# Patient Record
Sex: FEMALE | Race: WHITE | Employment: FULL TIME | URBAN - METROPOLITAN AREA
[De-identification: names, ages, dates, MRNs, and addresses within clinical notes are randomized per-mention and may not be internally consistent; named-entity substitution may affect disease eponyms.]

---

## 2017-03-29 ENCOUNTER — INITIAL CONSULT (OUTPATIENT)
Dept: PULMONOLOGY | Age: 52
End: 2017-03-29

## 2017-03-29 VITALS
DIASTOLIC BLOOD PRESSURE: 80 MMHG | WEIGHT: 170 LBS | OXYGEN SATURATION: 98 % | HEIGHT: 67 IN | HEART RATE: 106 BPM | SYSTOLIC BLOOD PRESSURE: 110 MMHG | BODY MASS INDEX: 26.68 KG/M2

## 2017-03-29 DIAGNOSIS — G25.81 RLS (RESTLESS LEGS SYNDROME): ICD-10-CM

## 2017-03-29 DIAGNOSIS — R06.83 SNORING: ICD-10-CM

## 2017-03-29 DIAGNOSIS — G47.10 HYPERSOMNIA: Primary | ICD-10-CM

## 2017-03-29 DIAGNOSIS — F33.41 RECURRENT MAJOR DEPRESSIVE DISORDER, IN PARTIAL REMISSION (HCC): Chronic | ICD-10-CM

## 2017-03-29 LAB
FERRITIN: 101.7 NG/ML (ref 15–150)
IRON SATURATION: 37 % (ref 15–50)
IRON: 104 UG/DL (ref 37–145)
TOTAL IRON BINDING CAPACITY: 281 UG/DL (ref 260–445)

## 2017-03-29 PROCEDURE — 99204 OFFICE O/P NEW MOD 45 MIN: CPT | Performed by: INTERNAL MEDICINE

## 2017-03-29 ASSESSMENT — ENCOUNTER SYMPTOMS
BACK PAIN: 1
APNEA: 1
PHOTOPHOBIA: 0
EYE PAIN: 0
ALLERGIC/IMMUNOLOGIC NEGATIVE: 1
NAUSEA: 0
CHOKING: 0
SHORTNESS OF BREATH: 0
RHINORRHEA: 0
CHEST TIGHTNESS: 0
VOMITING: 0
ABDOMINAL PAIN: 0
ABDOMINAL DISTENTION: 0

## 2017-03-29 ASSESSMENT — SLEEP AND FATIGUE QUESTIONNAIRES
HOW LIKELY ARE YOU TO NOD OFF OR FALL ASLEEP WHILE WATCHING TV: 2
ESS TOTAL SCORE: 7
HOW LIKELY ARE YOU TO NOD OFF OR FALL ASLEEP WHILE SITTING AND TALKING TO SOMEONE: 0
HOW LIKELY ARE YOU TO NOD OFF OR FALL ASLEEP WHILE SITTING QUIETLY AFTER LUNCH WITHOUT ALCOHOL: 0
HOW LIKELY ARE YOU TO NOD OFF OR FALL ASLEEP WHILE SITTING INACTIVE IN A PUBLIC PLACE: 0
HOW LIKELY ARE YOU TO NOD OFF OR FALL ASLEEP WHILE LYING DOWN TO REST IN THE AFTERNOON WHEN CIRCUMSTANCES PERMIT: 2
HOW LIKELY ARE YOU TO NOD OFF OR FALL ASLEEP WHEN YOU ARE A PASSENGER IN A CAR FOR AN HOUR WITHOUT A BREAK: 2
NECK CIRCUMFERENCE (INCHES): 17
HOW LIKELY ARE YOU TO NOD OFF OR FALL ASLEEP IN A CAR, WHILE STOPPED FOR A FEW MINUTES IN TRAFFIC: 0
HOW LIKELY ARE YOU TO NOD OFF OR FALL ASLEEP WHILE SITTING AND READING: 1

## 2017-04-03 RX ORDER — VENLAFAXINE HYDROCHLORIDE 75 MG/1
CAPSULE, EXTENDED RELEASE ORAL
Qty: 90 CAPSULE | Refills: 3 | Status: SHIPPED | OUTPATIENT
Start: 2017-04-03 | End: 2018-04-05 | Stop reason: SDUPTHER

## 2017-04-06 ENCOUNTER — HOSPITAL ENCOUNTER (OUTPATIENT)
Dept: SLEEP MEDICINE | Age: 52
Discharge: HOME | End: 2017-04-08
Attending: INTERNAL MEDICINE | Admitting: INTERNAL MEDICINE

## 2017-04-06 DIAGNOSIS — R06.83 SNORING: ICD-10-CM

## 2017-04-06 DIAGNOSIS — G47.10 HYPERSOMNIA: ICD-10-CM

## 2017-04-06 PROCEDURE — 95810 POLYSOM 6/> YRS 4/> PARAM: CPT | Performed by: INTERNAL MEDICINE

## 2017-04-11 ENCOUNTER — TELEPHONE (OUTPATIENT)
Dept: SLEEP MEDICINE | Age: 52
End: 2017-04-11

## 2017-04-13 ENCOUNTER — HOSPITAL ENCOUNTER (OUTPATIENT)
Dept: SLEEP MEDICINE | Age: 52
Discharge: HOME | End: 2017-04-15
Attending: INTERNAL MEDICINE | Admitting: INTERNAL MEDICINE

## 2017-04-13 DIAGNOSIS — R06.83 SNORING: ICD-10-CM

## 2017-04-13 DIAGNOSIS — G47.10 HYPERSOMNIA: ICD-10-CM

## 2017-04-13 PROCEDURE — 95811 POLYSOM 6/>YRS CPAP 4/> PARM: CPT | Performed by: INTERNAL MEDICINE

## 2017-05-17 ENCOUNTER — EMPLOYEE WELLNESS (OUTPATIENT)
Dept: OTHER | Age: 52
End: 2017-05-17

## 2017-05-17 LAB
CHOLESTEROL, TOTAL: 192 MG/DL (ref 0–199)
GLUCOSE BLD-MCNC: 77 MG/DL (ref 70–99)
HDLC SERPL-MCNC: 50 MG/DL (ref 40–60)
LDL CHOLESTEROL CALCULATED: 116 MG/DL
TRIGL SERPL-MCNC: 132 MG/DL (ref 0–150)

## 2017-06-06 ENCOUNTER — TELEPHONE (OUTPATIENT)
Dept: PULMONOLOGY | Age: 52
End: 2017-06-06

## 2017-06-07 ENCOUNTER — OFFICE VISIT (OUTPATIENT)
Dept: PULMONOLOGY | Age: 52
End: 2017-06-07

## 2017-06-07 VITALS
BODY MASS INDEX: 26.78 KG/M2 | WEIGHT: 170.6 LBS | HEART RATE: 72 BPM | DIASTOLIC BLOOD PRESSURE: 78 MMHG | HEIGHT: 67 IN | SYSTOLIC BLOOD PRESSURE: 118 MMHG | RESPIRATION RATE: 12 BRPM

## 2017-06-07 DIAGNOSIS — G47.33 OBSTRUCTIVE SLEEP APNEA SYNDROME: Primary | ICD-10-CM

## 2017-06-07 DIAGNOSIS — F33.41 RECURRENT MAJOR DEPRESSIVE DISORDER, IN PARTIAL REMISSION (HCC): Chronic | ICD-10-CM

## 2017-06-07 PROCEDURE — 99213 OFFICE O/P EST LOW 20 MIN: CPT | Performed by: NURSE PRACTITIONER

## 2017-06-07 ASSESSMENT — ENCOUNTER SYMPTOMS
ABDOMINAL DISTENTION: 0
COUGH: 0
SHORTNESS OF BREATH: 0
RHINORRHEA: 0
ABDOMINAL PAIN: 0
APNEA: 0
SINUS PRESSURE: 0

## 2017-06-07 ASSESSMENT — SLEEP AND FATIGUE QUESTIONNAIRES
HOW LIKELY ARE YOU TO NOD OFF OR FALL ASLEEP IN A CAR, WHILE STOPPED FOR A FEW MINUTES IN TRAFFIC: 0
ESS TOTAL SCORE: 3
HOW LIKELY ARE YOU TO NOD OFF OR FALL ASLEEP WHEN YOU ARE A PASSENGER IN A CAR FOR AN HOUR WITHOUT A BREAK: 1
HOW LIKELY ARE YOU TO NOD OFF OR FALL ASLEEP WHILE LYING DOWN TO REST IN THE AFTERNOON WHEN CIRCUMSTANCES PERMIT: 1
HOW LIKELY ARE YOU TO NOD OFF OR FALL ASLEEP WHILE WATCHING TV: 1
HOW LIKELY ARE YOU TO NOD OFF OR FALL ASLEEP WHILE SITTING QUIETLY AFTER LUNCH WITHOUT ALCOHOL: 0
HOW LIKELY ARE YOU TO NOD OFF OR FALL ASLEEP WHILE SITTING AND TALKING TO SOMEONE: 0
HOW LIKELY ARE YOU TO NOD OFF OR FALL ASLEEP WHILE SITTING AND READING: 0
HOW LIKELY ARE YOU TO NOD OFF OR FALL ASLEEP WHILE SITTING INACTIVE IN A PUBLIC PLACE: 0

## 2017-09-10 DIAGNOSIS — E78.00 HYPERCHOLESTEREMIA: ICD-10-CM

## 2017-09-11 RX ORDER — SIMVASTATIN 40 MG
TABLET ORAL
Qty: 90 TABLET | Refills: 3 | Status: SHIPPED | OUTPATIENT
Start: 2017-09-11 | End: 2018-09-16 | Stop reason: SDUPTHER

## 2017-12-06 ENCOUNTER — OFFICE VISIT (OUTPATIENT)
Dept: PULMONOLOGY | Age: 52
End: 2017-12-06

## 2017-12-06 VITALS
SYSTOLIC BLOOD PRESSURE: 120 MMHG | BODY MASS INDEX: 27.41 KG/M2 | HEART RATE: 76 BPM | DIASTOLIC BLOOD PRESSURE: 70 MMHG | WEIGHT: 175 LBS

## 2017-12-06 DIAGNOSIS — F33.41 RECURRENT MAJOR DEPRESSIVE DISORDER, IN PARTIAL REMISSION (HCC): Chronic | ICD-10-CM

## 2017-12-06 DIAGNOSIS — G47.33 OBSTRUCTIVE SLEEP APNEA SYNDROME: Primary | Chronic | ICD-10-CM

## 2017-12-06 PROCEDURE — 99212 OFFICE O/P EST SF 10 MIN: CPT | Performed by: NURSE PRACTITIONER

## 2017-12-06 ASSESSMENT — ENCOUNTER SYMPTOMS
APNEA: 0
ABDOMINAL DISTENTION: 0
COUGH: 0
RHINORRHEA: 0
SHORTNESS OF BREATH: 0
SINUS PRESSURE: 0
ABDOMINAL PAIN: 0

## 2017-12-06 ASSESSMENT — SLEEP AND FATIGUE QUESTIONNAIRES
HOW LIKELY ARE YOU TO NOD OFF OR FALL ASLEEP WHEN YOU ARE A PASSENGER IN A CAR FOR AN HOUR WITHOUT A BREAK: 2
HOW LIKELY ARE YOU TO NOD OFF OR FALL ASLEEP WHILE SITTING QUIETLY AFTER LUNCH WITHOUT ALCOHOL: 1
HOW LIKELY ARE YOU TO NOD OFF OR FALL ASLEEP WHILE SITTING AND TALKING TO SOMEONE: 0
HOW LIKELY ARE YOU TO NOD OFF OR FALL ASLEEP WHILE SITTING INACTIVE IN A PUBLIC PLACE: 0
HOW LIKELY ARE YOU TO NOD OFF OR FALL ASLEEP WHILE SITTING AND READING: 0
ESS TOTAL SCORE: 5
HOW LIKELY ARE YOU TO NOD OFF OR FALL ASLEEP WHILE LYING DOWN TO REST IN THE AFTERNOON WHEN CIRCUMSTANCES PERMIT: 1
HOW LIKELY ARE YOU TO NOD OFF OR FALL ASLEEP IN A CAR, WHILE STOPPED FOR A FEW MINUTES IN TRAFFIC: 0
HOW LIKELY ARE YOU TO NOD OFF OR FALL ASLEEP WHILE WATCHING TV: 1

## 2017-12-06 NOTE — PROGRESS NOTES
ACHILLES TENDON SURGERY Right     CHOLECYSTECTOMY      HYSTERECTOMY, VAGINAL      DUB:  Dr. Soliz See       Family History   Problem Relation Age of Onset    Other Mother     Heart Attack Father 46      at 66    Hypertension Father     Other Brother      bipolar       Vitals:  Weight BMI   Wt Readings from Last 3 Encounters:   17 175 lb (79.4 kg)   17 170 lb 9.6 oz (77.4 kg)   17 170 lb (77.1 kg)    Body mass index is 27.41 kg/m². BP HR SaO2   BP Readings from Last 3 Encounters:   17 120/70   17 118/78   17 110/80    Pulse Readings from Last 3 Encounters:   17 76   17 72   17 106    SpO2 Readings from Last 3 Encounters:   17 98%        Assessment:     1. Obstructive sleep apnea syndrome Stable    2. Recurrent major depressive disorder, in partial remission (HCC) Stable        The chronic medical conditions listed are directly related to the primary diagnosis listed above. The management of the primary diagnosis affects the secondary diagnosis and vice versa. Plan:   - Educated patient and reviewed compliance download with pt.    -Supplies and parts as needed for her machine, these are medically necessary.    - Patient using Other Patient Aides for supplies  -Continue medications per her PCP and other physicians.   -Limit caffeine use after 3pm.    -Encouraged her to work on weight loss through diet and exercise. -F/U: 12 month. No orders of the defined types were placed in this encounter. No orders of the defined types were placed in this encounter. No orders of the defined types were placed in this encounter.       Vanessa Pruett, MSN, RN, CNP

## 2017-12-06 NOTE — LETTER
[facility] Sleep Medicine  [address] [facility]  [address] [place] [id number]  Phone: [phone]  Fax: [phone]    December 6, 2017       Patient: Alessia Verma   MR Number: [id number]   YOB: 1965   Date of Visit: 12/6/2017       Alessia Verma was seen for a follow up visit today. Here is my assessment and plan as well as an attached copy of her visit today:      ASSESSMENT:  Wanda Jade was seen today for follow-up. Diagnoses and all orders for this visit:    Obstructive sleep apnea syndrome    Recurrent major depressive disorder, in partial remission ([facility]        Plan:       If you have questions or concerns, please do not hesitate to call me. I look forward to following Wanda Jade along with you.     Sincerely,      Maru Jeffries CNP    CC providers:  Michael Jones MD  [address] [place] [id number]  [facility]

## 2017-12-18 ENCOUNTER — HOSPITAL ENCOUNTER (OUTPATIENT)
Dept: MAMMOGRAPHY | Age: 52
Discharge: HOME | End: 2017-12-18
Attending: FAMILY MEDICINE | Admitting: FAMILY MEDICINE

## 2017-12-18 DIAGNOSIS — Z12.31 VISIT FOR SCREENING MAMMOGRAM: ICD-10-CM

## 2017-12-21 ENCOUNTER — HOSPITAL ENCOUNTER (OUTPATIENT)
Dept: MAMMOGRAPHY | Age: 52
Discharge: HOME | End: 2017-12-21
Attending: FAMILY MEDICINE | Admitting: FAMILY MEDICINE

## 2017-12-21 DIAGNOSIS — R92.8 ABNORMAL MAMMOGRAM: ICD-10-CM

## 2017-12-29 ENCOUNTER — TELEPHONE (OUTPATIENT)
Dept: FAMILY MEDICINE CLINIC | Age: 52
End: 2017-12-29

## 2017-12-29 NOTE — TELEPHONE ENCOUNTER
Please call:  She needs to make an appointment for a breast exam - she will likely need a diagnostic mammogram of her R breast.  If she has a gynecologist she can set it up with them.   We just want to make sure we follow up on the incomplete mammogram.    Thanks,  Mary Espinosa MD

## 2018-03-19 ENCOUNTER — TELEPHONE (OUTPATIENT)
Dept: GYNECOLOGY | Age: 53
End: 2018-03-19

## 2018-03-20 VITALS — BODY MASS INDEX: 26.31 KG/M2 | WEIGHT: 168 LBS

## 2018-03-27 ENCOUNTER — TELEPHONE (OUTPATIENT)
Dept: FAMILY MEDICINE CLINIC | Age: 53
End: 2018-03-27

## 2018-04-02 RX ORDER — VENLAFAXINE HYDROCHLORIDE 75 MG/1
CAPSULE, EXTENDED RELEASE ORAL
Qty: 90 CAPSULE | Refills: 3 | OUTPATIENT
Start: 2018-04-02

## 2018-04-05 ENCOUNTER — TELEPHONE (OUTPATIENT)
Dept: FAMILY MEDICINE CLINIC | Age: 53
End: 2018-04-05

## 2018-04-05 RX ORDER — VENLAFAXINE HYDROCHLORIDE 75 MG/1
75 CAPSULE, EXTENDED RELEASE ORAL DAILY
Qty: 90 CAPSULE | Refills: 0 | Status: SHIPPED | OUTPATIENT
Start: 2018-04-05 | End: 2018-07-01 | Stop reason: SDUPTHER

## 2018-04-09 ENCOUNTER — OFFICE VISIT (OUTPATIENT)
Dept: FAMILY MEDICINE CLINIC | Age: 53
End: 2018-04-09

## 2018-04-09 VITALS
DIASTOLIC BLOOD PRESSURE: 72 MMHG | BODY MASS INDEX: 25.9 KG/M2 | WEIGHT: 165 LBS | HEART RATE: 101 BPM | SYSTOLIC BLOOD PRESSURE: 103 MMHG | HEIGHT: 67 IN

## 2018-04-09 DIAGNOSIS — E78.00 HYPERCHOLESTEREMIA: Primary | Chronic | ICD-10-CM

## 2018-04-09 DIAGNOSIS — Z23 NEED FOR PROPHYLACTIC VACCINATION AGAINST STREPTOCOCCUS PNEUMONIAE (PNEUMOCOCCUS): ICD-10-CM

## 2018-04-09 DIAGNOSIS — F33.41 RECURRENT MAJOR DEPRESSIVE DISORDER, IN PARTIAL REMISSION (HCC): Chronic | ICD-10-CM

## 2018-04-09 DIAGNOSIS — Z72.0 TOBACCO ABUSE: ICD-10-CM

## 2018-04-09 DIAGNOSIS — G47.33 OBSTRUCTIVE SLEEP APNEA: ICD-10-CM

## 2018-04-09 LAB
A/G RATIO: 1.9 (ref 1.1–2.2)
ALBUMIN SERPL-MCNC: 4.4 G/DL (ref 3.4–5)
ALP BLD-CCNC: 64 U/L (ref 40–129)
ALT SERPL-CCNC: 14 U/L (ref 10–40)
ANION GAP SERPL CALCULATED.3IONS-SCNC: 16 MMOL/L (ref 3–16)
AST SERPL-CCNC: 25 U/L (ref 15–37)
BILIRUB SERPL-MCNC: <0.2 MG/DL (ref 0–1)
BUN BLDV-MCNC: 14 MG/DL (ref 7–20)
CALCIUM SERPL-MCNC: 9.3 MG/DL (ref 8.3–10.6)
CHLORIDE BLD-SCNC: 101 MMOL/L (ref 99–110)
CHOLESTEROL, TOTAL: 217 MG/DL (ref 0–199)
CO2: 22 MMOL/L (ref 21–32)
CREAT SERPL-MCNC: 1 MG/DL (ref 0.6–1.1)
GFR AFRICAN AMERICAN: >60
GFR NON-AFRICAN AMERICAN: 58
GLOBULIN: 2.3 G/DL
GLUCOSE BLD-MCNC: 91 MG/DL (ref 70–99)
HDLC SERPL-MCNC: 47 MG/DL (ref 40–60)
LDL CHOLESTEROL CALCULATED: 130 MG/DL
POTASSIUM SERPL-SCNC: 5.5 MMOL/L (ref 3.5–5.1)
SODIUM BLD-SCNC: 139 MMOL/L (ref 136–145)
TOTAL PROTEIN: 6.7 G/DL (ref 6.4–8.2)
TRIGL SERPL-MCNC: 202 MG/DL (ref 0–150)
VLDLC SERPL CALC-MCNC: 40 MG/DL

## 2018-04-09 PROCEDURE — 99214 OFFICE O/P EST MOD 30 MIN: CPT | Performed by: FAMILY MEDICINE

## 2018-04-09 PROCEDURE — 36415 COLL VENOUS BLD VENIPUNCTURE: CPT | Performed by: FAMILY MEDICINE

## 2018-04-09 PROCEDURE — 90732 PPSV23 VACC 2 YRS+ SUBQ/IM: CPT | Performed by: FAMILY MEDICINE

## 2018-04-09 PROCEDURE — 90471 IMMUNIZATION ADMIN: CPT | Performed by: FAMILY MEDICINE

## 2018-04-09 RX ORDER — VARENICLINE TARTRATE 25 MG
KIT ORAL
Qty: 1 EACH | Refills: 0 | Status: SHIPPED | OUTPATIENT
Start: 2018-04-09 | End: 2018-12-06

## 2018-04-09 RX ORDER — CETIRIZINE HYDROCHLORIDE 10 MG/1
10 TABLET ORAL DAILY
COMMUNITY

## 2018-04-09 RX ORDER — VARENICLINE TARTRATE 1 MG/1
1 TABLET, FILM COATED ORAL 2 TIMES DAILY
Qty: 60 TABLET | Refills: 3 | Status: SHIPPED | OUTPATIENT
Start: 2018-04-09 | End: 2018-12-06

## 2018-04-09 ASSESSMENT — ENCOUNTER SYMPTOMS
NAUSEA: 0
CONSTIPATION: 0
VOMITING: 0
COUGH: 0
SHORTNESS OF BREATH: 0
DIARRHEA: 0
ABDOMINAL PAIN: 1

## 2018-04-09 ASSESSMENT — PATIENT HEALTH QUESTIONNAIRE - PHQ9
1. LITTLE INTEREST OR PLEASURE IN DOING THINGS: 0
SUM OF ALL RESPONSES TO PHQ QUESTIONS 1-9: 0
SUM OF ALL RESPONSES TO PHQ9 QUESTIONS 1 & 2: 0
2. FEELING DOWN, DEPRESSED OR HOPELESS: 0

## 2018-05-14 ENCOUNTER — EMPLOYEE WELLNESS (OUTPATIENT)
Dept: OTHER | Age: 53
End: 2018-05-14

## 2018-05-15 ENCOUNTER — OFFICE VISIT (OUTPATIENT)
Dept: GYNECOLOGY | Age: 53
End: 2018-05-15

## 2018-05-15 VITALS
WEIGHT: 168.6 LBS | RESPIRATION RATE: 17 BRPM | SYSTOLIC BLOOD PRESSURE: 105 MMHG | BODY MASS INDEX: 26.46 KG/M2 | HEIGHT: 67 IN | HEART RATE: 87 BPM | DIASTOLIC BLOOD PRESSURE: 72 MMHG | TEMPERATURE: 97.3 F

## 2018-05-15 DIAGNOSIS — Z01.419 WELL WOMAN EXAM WITH ROUTINE GYNECOLOGICAL EXAM: Primary | ICD-10-CM

## 2018-05-15 DIAGNOSIS — Z78.0 MENOPAUSE: ICD-10-CM

## 2018-05-15 PROCEDURE — 99386 PREV VISIT NEW AGE 40-64: CPT | Performed by: OBSTETRICS & GYNECOLOGY

## 2018-05-17 LAB
HPV COMMENT: NORMAL
HPV TYPE 16: NOT DETECTED
HPV TYPE 18: NOT DETECTED
HPVOH (OTHER TYPES): NOT DETECTED

## 2018-05-17 ASSESSMENT — ENCOUNTER SYMPTOMS
EYES NEGATIVE: 1
RESPIRATORY NEGATIVE: 1
GASTROINTESTINAL NEGATIVE: 1

## 2018-05-21 VITALS — BODY MASS INDEX: 26 KG/M2 | WEIGHT: 166 LBS

## 2018-07-02 RX ORDER — VENLAFAXINE HYDROCHLORIDE 75 MG/1
CAPSULE, EXTENDED RELEASE ORAL
Qty: 90 CAPSULE | Refills: 3 | Status: SHIPPED | OUTPATIENT
Start: 2018-07-02 | End: 2019-07-01 | Stop reason: SDUPTHER

## 2018-09-16 DIAGNOSIS — E78.00 HYPERCHOLESTEREMIA: ICD-10-CM

## 2018-09-17 RX ORDER — SIMVASTATIN 40 MG
TABLET ORAL
Qty: 90 TABLET | Refills: 3 | Status: SHIPPED | OUTPATIENT
Start: 2018-09-17 | End: 2019-09-11 | Stop reason: SDUPTHER

## 2018-12-06 ENCOUNTER — OFFICE VISIT (OUTPATIENT)
Dept: PULMONOLOGY | Age: 53
End: 2018-12-06
Payer: COMMERCIAL

## 2018-12-06 VITALS
DIASTOLIC BLOOD PRESSURE: 70 MMHG | HEART RATE: 79 BPM | HEIGHT: 67 IN | OXYGEN SATURATION: 99 % | SYSTOLIC BLOOD PRESSURE: 110 MMHG | WEIGHT: 181 LBS | BODY MASS INDEX: 28.41 KG/M2

## 2018-12-06 DIAGNOSIS — G47.33 OBSTRUCTIVE SLEEP APNEA: Primary | ICD-10-CM

## 2018-12-06 PROCEDURE — 99212 OFFICE O/P EST SF 10 MIN: CPT | Performed by: NURSE PRACTITIONER

## 2018-12-06 ASSESSMENT — ENCOUNTER SYMPTOMS
APNEA: 0
SHORTNESS OF BREATH: 0
ABDOMINAL DISTENTION: 0
EYE REDNESS: 0
RHINORRHEA: 0
EYE PAIN: 0
ABDOMINAL PAIN: 0
COUGH: 0
SINUS PRESSURE: 0

## 2018-12-06 ASSESSMENT — SLEEP AND FATIGUE QUESTIONNAIRES
ESS TOTAL SCORE: 6
HOW LIKELY ARE YOU TO NOD OFF OR FALL ASLEEP WHEN YOU ARE A PASSENGER IN A CAR FOR AN HOUR WITHOUT A BREAK: 2
HOW LIKELY ARE YOU TO NOD OFF OR FALL ASLEEP WHILE WATCHING TV: 1
HOW LIKELY ARE YOU TO NOD OFF OR FALL ASLEEP IN A CAR, WHILE STOPPED FOR A FEW MINUTES IN TRAFFIC: 0
HOW LIKELY ARE YOU TO NOD OFF OR FALL ASLEEP WHILE SITTING AND READING: 1
HOW LIKELY ARE YOU TO NOD OFF OR FALL ASLEEP WHILE SITTING INACTIVE IN A PUBLIC PLACE: 0
HOW LIKELY ARE YOU TO NOD OFF OR FALL ASLEEP WHILE LYING DOWN TO REST IN THE AFTERNOON WHEN CIRCUMSTANCES PERMIT: 1
HOW LIKELY ARE YOU TO NOD OFF OR FALL ASLEEP WHILE SITTING AND TALKING TO SOMEONE: 0
HOW LIKELY ARE YOU TO NOD OFF OR FALL ASLEEP WHILE SITTING QUIETLY AFTER LUNCH WITHOUT ALCOHOL: 1

## 2019-02-13 ENCOUNTER — OFFICE VISIT (OUTPATIENT)
Dept: DERMATOLOGY | Age: 54
End: 2019-02-13
Payer: COMMERCIAL

## 2019-02-13 DIAGNOSIS — L71.9 ROSACEA: Primary | ICD-10-CM

## 2019-02-13 DIAGNOSIS — Z12.83 SKIN CANCER SCREENING: ICD-10-CM

## 2019-02-13 DIAGNOSIS — L71.0 PERIORIFICIAL DERMATITIS: ICD-10-CM

## 2019-02-13 DIAGNOSIS — D22.9 MULTIPLE BENIGN MELANOCYTIC NEVI: ICD-10-CM

## 2019-02-13 PROCEDURE — 99203 OFFICE O/P NEW LOW 30 MIN: CPT | Performed by: DERMATOLOGY

## 2019-02-13 RX ORDER — DOXYCYCLINE HYCLATE 50 MG/1
50 CAPSULE ORAL DAILY
Qty: 90 CAPSULE | Refills: 1 | Status: SHIPPED | OUTPATIENT
Start: 2019-02-13 | End: 2019-07-23 | Stop reason: ALTCHOICE

## 2019-02-13 RX ORDER — IVERMECTIN 10 MG/G
1 CREAM TOPICAL DAILY
Qty: 30 G | Refills: 5 | Status: SHIPPED | OUTPATIENT
Start: 2019-02-13 | End: 2019-07-23 | Stop reason: ALTCHOICE

## 2019-03-15 PROBLEM — Z12.83 SKIN CANCER SCREENING: Status: RESOLVED | Noted: 2019-02-13 | Resolved: 2019-03-15

## 2019-05-23 ENCOUNTER — EMPLOYEE WELLNESS (OUTPATIENT)
Dept: OTHER | Age: 54
End: 2019-05-23

## 2019-05-23 LAB
CHOLESTEROL, TOTAL: 189 MG/DL
CHOLESTEROL/HDL RATIO: NORMAL
HDLC SERPL-MCNC: 45 MG/DL (ref 35–70)
LDL CHOLESTEROL CALCULATED: 111 MG/DL (ref 0–160)
TRIGL SERPL-MCNC: 164 MG/DL
VLDLC SERPL CALC-MCNC: NORMAL MG/DL

## 2019-05-28 VITALS — BODY MASS INDEX: 29.13 KG/M2 | WEIGHT: 186 LBS

## 2019-07-02 RX ORDER — VENLAFAXINE HYDROCHLORIDE 75 MG/1
CAPSULE, EXTENDED RELEASE ORAL
Qty: 90 CAPSULE | Refills: 3 | Status: SHIPPED | OUTPATIENT
Start: 2019-07-02 | End: 2020-06-29

## 2019-07-23 ENCOUNTER — OFFICE VISIT (OUTPATIENT)
Dept: PRIMARY CARE CLINIC | Age: 54
End: 2019-07-23
Payer: COMMERCIAL

## 2019-07-23 VITALS
HEIGHT: 67 IN | BODY MASS INDEX: 29.41 KG/M2 | DIASTOLIC BLOOD PRESSURE: 72 MMHG | WEIGHT: 187.4 LBS | SYSTOLIC BLOOD PRESSURE: 108 MMHG | HEART RATE: 82 BPM

## 2019-07-23 DIAGNOSIS — E78.00 HYPERCHOLESTEREMIA: Chronic | ICD-10-CM

## 2019-07-23 DIAGNOSIS — M77.52 CALCANEAL BURSITIS (HEEL), LEFT: ICD-10-CM

## 2019-07-23 DIAGNOSIS — G47.33 OBSTRUCTIVE SLEEP APNEA: ICD-10-CM

## 2019-07-23 DIAGNOSIS — F33.41 RECURRENT MAJOR DEPRESSIVE DISORDER, IN PARTIAL REMISSION (HCC): Chronic | ICD-10-CM

## 2019-07-23 DIAGNOSIS — G25.81 RESTLESS LEG: ICD-10-CM

## 2019-07-23 DIAGNOSIS — M79.672 LEFT FOOT PAIN: Primary | ICD-10-CM

## 2019-07-23 DIAGNOSIS — Z00.00 WELL ADULT EXAM: ICD-10-CM

## 2019-07-23 LAB
A/G RATIO: 2.1 (ref 1.1–2.2)
ALBUMIN SERPL-MCNC: 4.7 G/DL (ref 3.4–5)
ALP BLD-CCNC: 82 U/L (ref 40–129)
ALT SERPL-CCNC: 24 U/L (ref 10–40)
ANION GAP SERPL CALCULATED.3IONS-SCNC: 11 MMOL/L (ref 3–16)
AST SERPL-CCNC: 26 U/L (ref 15–37)
BILIRUB SERPL-MCNC: <0.2 MG/DL (ref 0–1)
BUN BLDV-MCNC: 17 MG/DL (ref 7–20)
CALCIUM SERPL-MCNC: 9.9 MG/DL (ref 8.3–10.6)
CHLORIDE BLD-SCNC: 101 MMOL/L (ref 99–110)
CHOLESTEROL, TOTAL: 225 MG/DL (ref 0–199)
CO2: 28 MMOL/L (ref 21–32)
CREAT SERPL-MCNC: 1.1 MG/DL (ref 0.6–1.1)
GFR AFRICAN AMERICAN: >60
GFR NON-AFRICAN AMERICAN: 52
GLOBULIN: 2.2 G/DL
GLUCOSE BLD-MCNC: 99 MG/DL (ref 70–99)
HDLC SERPL-MCNC: 59 MG/DL (ref 40–60)
LDL CHOLESTEROL CALCULATED: 140 MG/DL
POTASSIUM SERPL-SCNC: 4.6 MMOL/L (ref 3.5–5.1)
SODIUM BLD-SCNC: 140 MMOL/L (ref 136–145)
TOTAL PROTEIN: 6.9 G/DL (ref 6.4–8.2)
TRIGL SERPL-MCNC: 130 MG/DL (ref 0–150)
VLDLC SERPL CALC-MCNC: 26 MG/DL

## 2019-07-23 PROCEDURE — 99396 PREV VISIT EST AGE 40-64: CPT | Performed by: FAMILY MEDICINE

## 2019-07-23 RX ORDER — CYCLOBENZAPRINE HCL 10 MG
10 TABLET ORAL EVERY 8 HOURS PRN
Qty: 20 TABLET | Refills: 0 | Status: SHIPPED | OUTPATIENT
Start: 2019-07-23 | End: 2020-07-22

## 2019-07-23 RX ORDER — CARBIDOPA/LEVODOPA 25MG-250MG
1 TABLET ORAL DAILY
Qty: 90 TABLET | Refills: 3 | Status: SHIPPED | OUTPATIENT
Start: 2019-07-23 | End: 2020-09-28

## 2019-07-23 ASSESSMENT — ENCOUNTER SYMPTOMS
COUGH: 0
RHINORRHEA: 0
SORE THROAT: 0
CONSTIPATION: 0
ABDOMINAL PAIN: 0
COLOR CHANGE: 0
DIARRHEA: 0
SHORTNESS OF BREATH: 0
EYE PAIN: 0
VOMITING: 0
NAUSEA: 0

## 2019-07-23 NOTE — PROGRESS NOTES
Negative for chest pain and palpitations. Gastrointestinal: Negative for abdominal pain, constipation, diarrhea, nausea and vomiting. Genitourinary: Negative for dysuria and frequency. Musculoskeletal: Negative for joint swelling and myalgias. Skin: Negative for color change and rash. Neurological: Negative for weakness, numbness and headaches. Hematological: Negative for adenopathy. Does not bruise/bleed easily. Psychiatric/Behavioral: Negative for dysphoric mood, self-injury and suicidal ideas. The patient is not nervous/anxious.         Past Medical History:   Diagnosis Date    Anxiety     Hypercholesteremia     Obstructive sleep apnea (adult) (pediatric)     Set at 15CWP     Family History   Problem Relation Age of Onset    Other Mother     Heart Attack Father 46         at 66    Hypertension Father     Other Brother         bipolar     Social History     Socioeconomic History    Marital status: Single     Spouse name: Yohana Coffey ([phone] [facility])     Number of children: 0    Years of education: Not on file    Highest education level: Not on file   Occupational History    Occupation: [employer]   Social Needs    Financial resource strain: Not on file    Food insecurity:     Worry: Not on file     Inability: Not on file    Transportation needs:     Medical: Not on file     Non-medical: Not on file   Tobacco Use    Smoking status: Former Smoker     Packs/day: 0.25     Years: 22.00     Pack years: 5.50     Types: Cigarettes     Last attempt to quit: 2018     Years since quittin.0    Smokeless tobacco: Never Used    Tobacco comment: contemplative, in process of quitting , discussed with patient   Substance and Sexual Activity    Alcohol use: Yes     Comment: occasionally    Drug use: No    Sexual activity: Yes     Partners: Female   Lifestyle    Physical activity:     Days per week: Not on file     Minutes per session: Not on file    Stress: Not on file

## 2019-09-11 DIAGNOSIS — E78.00 HYPERCHOLESTEREMIA: ICD-10-CM

## 2019-09-11 RX ORDER — SIMVASTATIN 40 MG
TABLET ORAL
Qty: 90 TABLET | Refills: 3 | Status: SHIPPED | OUTPATIENT
Start: 2019-09-11 | End: 2020-09-08 | Stop reason: SDUPTHER

## 2019-10-25 ENCOUNTER — HOSPITAL ENCOUNTER (OUTPATIENT)
Dept: MAMMOGRAPHY | Age: 54
Discharge: HOME | End: 2019-10-25
Attending: FAMILY MEDICINE | Admitting: FAMILY MEDICINE
Payer: COMMERCIAL

## 2019-10-25 DIAGNOSIS — Z12.31 VISIT FOR SCREENING MAMMOGRAM: ICD-10-CM

## 2019-10-25 PROCEDURE — 77063 BREAST TOMOSYNTHESIS BI: CPT

## 2019-12-11 ENCOUNTER — OFFICE VISIT (OUTPATIENT)
Dept: PULMONOLOGY | Age: 54
End: 2019-12-11
Payer: COMMERCIAL

## 2019-12-11 VITALS
HEART RATE: 80 BPM | OXYGEN SATURATION: 98 % | SYSTOLIC BLOOD PRESSURE: 102 MMHG | BODY MASS INDEX: 29.19 KG/M2 | DIASTOLIC BLOOD PRESSURE: 70 MMHG | WEIGHT: 186 LBS | HEIGHT: 67 IN

## 2019-12-11 DIAGNOSIS — Z72.0 TOBACCO ABUSE: ICD-10-CM

## 2019-12-11 DIAGNOSIS — G47.33 OBSTRUCTIVE SLEEP APNEA: Primary | ICD-10-CM

## 2019-12-11 PROCEDURE — 99212 OFFICE O/P EST SF 10 MIN: CPT | Performed by: NURSE PRACTITIONER

## 2019-12-11 ASSESSMENT — SLEEP AND FATIGUE QUESTIONNAIRES
HOW LIKELY ARE YOU TO NOD OFF OR FALL ASLEEP WHILE SITTING QUIETLY AFTER LUNCH WITHOUT ALCOHOL: 0
ESS TOTAL SCORE: 5
HOW LIKELY ARE YOU TO NOD OFF OR FALL ASLEEP WHEN YOU ARE A PASSENGER IN A CAR FOR AN HOUR WITHOUT A BREAK: 1
HOW LIKELY ARE YOU TO NOD OFF OR FALL ASLEEP WHILE LYING DOWN TO REST IN THE AFTERNOON WHEN CIRCUMSTANCES PERMIT: 1
HOW LIKELY ARE YOU TO NOD OFF OR FALL ASLEEP IN A CAR, WHILE STOPPED FOR A FEW MINUTES IN TRAFFIC: 1
HOW LIKELY ARE YOU TO NOD OFF OR FALL ASLEEP WHILE WATCHING TV: 1
HOW LIKELY ARE YOU TO NOD OFF OR FALL ASLEEP WHILE SITTING AND TALKING TO SOMEONE: 0
HOW LIKELY ARE YOU TO NOD OFF OR FALL ASLEEP WHILE SITTING AND READING: 1
HOW LIKELY ARE YOU TO NOD OFF OR FALL ASLEEP WHILE SITTING INACTIVE IN A PUBLIC PLACE: 0

## 2019-12-11 ASSESSMENT — ENCOUNTER SYMPTOMS
APNEA: 0
ABDOMINAL DISTENTION: 0
COUGH: 0
SHORTNESS OF BREATH: 0
RHINORRHEA: 0
ABDOMINAL PAIN: 0
SINUS PRESSURE: 0
EYE PAIN: 0
EYE REDNESS: 0

## 2020-06-05 ENCOUNTER — OFFICE VISIT (OUTPATIENT)
Dept: PRIMARY CARE CLINIC | Age: 55
End: 2020-06-05

## 2020-06-07 LAB
SARS-COV-2: NOT DETECTED
SOURCE: NORMAL

## 2020-06-10 ENCOUNTER — ANESTHESIA EVENT (OUTPATIENT)
Dept: ENDOSCOPY | Age: 55
End: 2020-06-10
Payer: COMMERCIAL

## 2020-06-11 ENCOUNTER — HOSPITAL ENCOUNTER (OUTPATIENT)
Age: 55
Setting detail: OUTPATIENT SURGERY
Discharge: HOME | End: 2020-06-11
Attending: INTERNAL MEDICINE | Admitting: INTERNAL MEDICINE
Payer: COMMERCIAL

## 2020-06-11 ENCOUNTER — ANESTHESIA (OUTPATIENT)
Dept: ENDOSCOPY | Age: 55
End: 2020-06-11
Payer: COMMERCIAL

## 2020-06-11 VITALS
RESPIRATION RATE: 16 BRPM | WEIGHT: 191.13 LBS | HEART RATE: 80 BPM | HEIGHT: 67 IN | OXYGEN SATURATION: 100 % | DIASTOLIC BLOOD PRESSURE: 83 MMHG | SYSTOLIC BLOOD PRESSURE: 123 MMHG | TEMPERATURE: 96.6 F | BODY MASS INDEX: 30 KG/M2

## 2020-06-11 VITALS
SYSTOLIC BLOOD PRESSURE: 105 MMHG | RESPIRATION RATE: 16 BRPM | DIASTOLIC BLOOD PRESSURE: 78 MMHG | OXYGEN SATURATION: 98 % | TEMPERATURE: 97.5 F

## 2020-06-11 PROCEDURE — 2580000003 HC RX 258: Performed by: ANESTHESIOLOGY

## 2020-06-11 PROCEDURE — 2500000003 HC RX 250 WO HCPCS

## 2020-06-11 PROCEDURE — 3609027000 HC COLONOSCOPY: Performed by: INTERNAL MEDICINE

## 2020-06-11 PROCEDURE — 3700000001 HC ADD 15 MINUTES (ANESTHESIA): Performed by: INTERNAL MEDICINE

## 2020-06-11 PROCEDURE — 7100000011 HC PHASE II RECOVERY - ADDTL 15 MIN: Performed by: INTERNAL MEDICINE

## 2020-06-11 PROCEDURE — 3700000000 HC ANESTHESIA ATTENDED CARE: Performed by: INTERNAL MEDICINE

## 2020-06-11 PROCEDURE — 6360000002 HC RX W HCPCS

## 2020-06-11 PROCEDURE — 7100000010 HC PHASE II RECOVERY - FIRST 15 MIN: Performed by: INTERNAL MEDICINE

## 2020-06-11 RX ORDER — SODIUM CHLORIDE 0.9 % (FLUSH) 0.9 %
10 SYRINGE (ML) INJECTION EVERY 12 HOURS SCHEDULED
Status: DISCONTINUED | OUTPATIENT
Start: 2020-06-11 | End: 2020-06-11 | Stop reason: HOSPADM

## 2020-06-11 RX ORDER — SODIUM CHLORIDE 9 MG/ML
INJECTION, SOLUTION INTRAVENOUS CONTINUOUS
Status: DISCONTINUED | OUTPATIENT
Start: 2020-06-11 | End: 2020-06-11 | Stop reason: HOSPADM

## 2020-06-11 RX ORDER — LIDOCAINE HYDROCHLORIDE 20 MG/ML
INJECTION, SOLUTION EPIDURAL; INFILTRATION; INTRACAUDAL; PERINEURAL PRN
Status: DISCONTINUED | OUTPATIENT
Start: 2020-06-11 | End: 2020-06-11 | Stop reason: SDUPTHER

## 2020-06-11 RX ORDER — PROPOFOL 10 MG/ML
INJECTION, EMULSION INTRAVENOUS PRN
Status: DISCONTINUED | OUTPATIENT
Start: 2020-06-11 | End: 2020-06-11 | Stop reason: SDUPTHER

## 2020-06-11 RX ORDER — SODIUM CHLORIDE 0.9 % (FLUSH) 0.9 %
10 SYRINGE (ML) INJECTION PRN
Status: DISCONTINUED | OUTPATIENT
Start: 2020-06-11 | End: 2020-06-11 | Stop reason: HOSPADM

## 2020-06-11 RX ADMIN — PROPOFOL 40 MG: 10 INJECTION, EMULSION INTRAVENOUS at 08:32

## 2020-06-11 RX ADMIN — LIDOCAINE HYDROCHLORIDE 50 MG: 20 INJECTION, SOLUTION EPIDURAL; INFILTRATION; INTRACAUDAL; PERINEURAL at 08:26

## 2020-06-11 RX ADMIN — SODIUM CHLORIDE: 9 INJECTION, SOLUTION INTRAVENOUS at 08:05

## 2020-06-11 RX ADMIN — PROPOFOL 80 MG: 10 INJECTION, EMULSION INTRAVENOUS at 08:26

## 2020-06-11 RX ADMIN — PROPOFOL 40 MG: 10 INJECTION, EMULSION INTRAVENOUS at 08:37

## 2020-06-11 ASSESSMENT — ENCOUNTER SYMPTOMS: SHORTNESS OF BREATH: 0

## 2020-06-11 ASSESSMENT — PAIN SCALES - GENERAL
PAINLEVEL_OUTOF10: 0

## 2020-06-11 ASSESSMENT — PAIN - FUNCTIONAL ASSESSMENT: PAIN_FUNCTIONAL_ASSESSMENT: 0-10

## 2020-06-11 NOTE — ANESTHESIA PRE PROCEDURE
 cetirizine (ZYRTEC) 10 MG tablet Take 10 mg by mouth daily      cyclobenzaprine (FLEXERIL) 10 MG tablet Take 1 tablet by mouth every 8 hours as needed for Muscle spasms 20 tablet 0     Current Facility-Administered Medications   Medication Dose Route Frequency Provider Last Rate Last Dose    0.9 % sodium chloride infusion   Intravenous Continuous Sinda Gitelman, MD 75 mL/hr at 20 0805      sodium chloride flush 0.9 % injection 10 mL  10 mL Intravenous 2 times per day Sinda Gitelman, MD        sodium chloride flush 0.9 % injection 10 mL  10 mL Intravenous PRN Sinda Gitelman, MD         Vital Signs (Current)   Vitals:    20 1228 20   BP:  113/74   Pulse:  87   Resp:  16   Temp:  97.6 °F (36.4 °C)   TempSrc:  Temporal   SpO2:  98%   Weight: 180 lb (81.6 kg) 191 lb 2 oz (86.7 kg)   Height: 5' 7\" (1.702 m) 5' 7\" (1.702 m)                                          BP Readings from Last 3 Encounters:   20 113/74   19 102/70   19 108/72     Vital Signs Statistics (for past 48 hrs)     Temp  Av.6 °F (36.4 °C)  Min: 97.6 °F (36.4 °C)   Min taken time: 20  Max: 97.6 °F (36.4 °C)   Max taken time: 20  Pulse  Av  Min: 87   Min taken time: 20  Max: 80   Max taken time: 20  Resp  Av  Min: 12   Min taken time: 20  Max: 12   Max taken time: 20  BP  Min: 113/74   Min taken time: 20  Max: 113/74   Max taken time: 20  SpO2  Av %  Min: 98 %   Min taken time: 20  Max: 98 %   Max taken time: 20  BP Readings from Last 3 Encounters:   20 113/74   19 10219 108/72       BMI  Body mass index is 29.93 kg/m². Estimated body mass index is 29.93 kg/m² as calculated from the following:    Height as of this encounter: 5' 7\" (1.702 m). Weight as of this encounter: 191 lb 2 oz (86.7 kg).     CBC   Lab Results   Component Value Date    WBC 6.3

## 2020-06-11 NOTE — H&P
Not on file   Tobacco Use    Smoking status: Former Smoker     Packs/day: 0.25     Years: 22.00     Pack years: 5.50     Types: Cigarettes     Last attempt to quit: 2018     Years since quittin.9    Smokeless tobacco: Never Used    Tobacco comment: contemplative, in process of quitting , discussed with patient   Substance and Sexual Activity    Alcohol use: Yes     Comment: occasionally    Drug use: No    Sexual activity: Yes     Partners: Female   Lifestyle    Physical activity     Days per week: Not on file     Minutes per session: Not on file    Stress: Not on file   Relationships    Social connections     Talks on phone: Not on file     Gets together: Not on file     Attends Christianity service: Not on file     Active member of club or organization: Not on file     Attends meetings of clubs or organizations: Not on file     Relationship status: Not on file    Intimate partner violence     Fear of current or ex partner: Not on file     Emotionally abused: Not on file     Physically abused: Not on file     Forced sexual activity: Not on file   Other Topics Concern    Not on file   Social History Narrative    Not on file           Family History:   Family History   Problem Relation Age of Onset    Other Mother     Heart Attack Father 46         at 66    Hypertension Father     Other Brother         bipolar         PHYSICAL EXAM:      /74   Pulse 87   Temp 97.6 °F (36.4 °C) (Temporal)   Resp 16   Ht 5' 7\" (1.702 m)   Wt 191 lb 2 oz (86.7 kg)   LMP  (LMP Unknown)   SpO2 98%   BMI 29.93 kg/m²  I        Heart: Normal    Lungs: Normal    Abdomen: Normal      ASA Grade: ASA 2 - Patient with mild systemic disease with no functional limitations    II (soft palate, uvula, fauces visible)  ASSESSMENT AND PLAN:    1. Patient is a 54 y.o. female here for Colonoscopy  2. Procedure options, risks and benefits reviewed with patient who expresses understanding.

## 2020-06-11 NOTE — BRIEF OP NOTE
Brief Postoperative Note    Karen Holland  YOB: 1965  [id number]      Pre-operative Diagnosis: Family history of colon cancer    Previous Colonoscopy: Yes  When: 03/03/15  Greater than 3 years? Yes      Post-operative Diagnosis: Same    Procedure: Colonoscopy    The preparation was good.     Anesthesia: MAC    Surgeons/Assistants: Shabbir Fisher MD    Estimated Blood Loss: None    Complications: None    Specimens: Was Not Obtained    Findings: See dictatred report    Electronically signed by Shabbir Fisher MD on 7/10/2017 at 7:45 AM

## 2020-06-11 NOTE — OP NOTE
Operative Note      Patient: Leonard Cartagena  YOB: 1965  MRN: [id number]    Date of Procedure: 6/11/2020    Pre-Op Diagnosis: FAMILY HISTORY COLON CANCER    Post-Op Diagnosis: Same       Procedure(s):  COLORECTAL CANCER SCREENING, HIGH RISK    Surgeon(s):  Carline Mancilla MD    Assistant:   * No surgical staff found *    Anesthesia: Monitor Anesthesia Care    Estimated Blood Loss (mL): None    Complications: None    Specimens:   * No specimens in log *    Implants:  * No implants in log *      Drains: * No LDAs found *    Findings: See dictated report    Detailed Description of Procedure:   See dictated report    Electronically signed by Carline Mancilla MD on 6/11/2020 at 8:48 AM

## 2020-06-11 NOTE — ANESTHESIA POSTPROCEDURE EVALUATION
Department of Anesthesiology  Postprocedure Note    Patient: Karen Holland  MRN: [id number]  YOB: 1965  Date of evaluation: 6/11/2020  Time:  9:28 AM     Procedure Summary     Date:  06/11/20 Room / Location:  [address]    Anesthesia Start:  5321 Anesthesia Stop:  2497    Procedure:  COLORECTAL CANCER SCREENING, HIGH RISK (N/A ) Diagnosis:       Family history of colon cancer      (FAMILY HISTORY COLON CANCER)    Surgeon:  Shabbir Fisher MD Responsible Provider:  Suhas Mcgregor MD    Anesthesia Type:  MAC ASA Status:  2          Anesthesia Type: MAC    Hortensia Phase I: Hortensia Score: 10    Hortensia Phase II: Hortensia Score: 10    Last vitals: Reviewed and per EMR flowsheets.        Anesthesia Post Evaluation    Patient location during evaluation: PACU  Level of consciousness: awake and alert  Airway patency: patent  Nausea & Vomiting: no nausea and no vomiting  Complications: no  Cardiovascular status: blood pressure returned to baseline  Respiratory status: acceptable  Hydration status: euvolemic  Comments: Postoperative Anesthesia Note    Name:    Karen Holland  MRN:      [id number]    Patient Vitals in the past 12 hrs:  06/11/20 0912, BP:123/83, Pulse:80, Resp:16, SpO2:100 %  06/11/20 0901, BP:114/79, Pulse:79, Resp:16, SpO2:96 %  06/11/20 0853, BP:111/74, Temp:96.6 °F (35.9 °C), Temp src:Temporal, Pulse:80, Resp:16, SpO2:100 %  06/11/20 0757, BP:113/74, Temp:97.6 °F (36.4 °C), Temp src:Temporal, Pulse:87, Resp:16, SpO2:98 %, Height:5' 7\" (1.702 m), Weight:191 lb 2 oz (86.7 kg)     LABS:    CBC  Lab Results       Component                Value               Date/Time                  WBC                      6.3                 09/16/2016 02:13 PM        HGB                      14.1                09/16/2016 02:13 PM        HCT                      41.5                09/16/2016 02:13 PM        PLT                      257                 09/16/2016 02:13 PM   RENAL  Lab Results       Component                Value               Date/Time                  NA                       140                 07/23/2019 11:00 AM        K                        4.6                 07/23/2019 11:00 AM        CL                       101                 07/23/2019 11:00 AM        CO2                      28                  07/23/2019 11:00 AM        BUN                      17                  07/23/2019 11:00 AM        CREATININE               1.1                 07/23/2019 11:00 AM        GLUCOSE                  99                  07/23/2019 11:00 AM   COAGS  No results found for: PROTIME, INR, APTT    Intake & Output:  @24HRIO@    Nausea & Vomiting:  No    Level of Consciousness:  Awake    Pain Assessment:  Adequate analgesia    Anesthesia Complications:  No apparent anesthetic complications    SUMMARY      Vital signs stable  OK to discharge from Stage I post anesthesia care.   Care transferred from Anesthesiology department on discharge from perioperative area

## 2020-06-29 RX ORDER — VENLAFAXINE HYDROCHLORIDE 75 MG/1
CAPSULE, EXTENDED RELEASE ORAL
Qty: 90 CAPSULE | Refills: 3 | Status: SHIPPED | OUTPATIENT
Start: 2020-06-29 | End: 2021-06-21

## 2020-07-06 ENCOUNTER — OFFICE VISIT (OUTPATIENT)
Dept: SURGERY | Age: 55
End: 2020-07-06
Payer: COMMERCIAL

## 2020-07-06 VITALS
SYSTOLIC BLOOD PRESSURE: 112 MMHG | HEART RATE: 89 BPM | OXYGEN SATURATION: 98 % | DIASTOLIC BLOOD PRESSURE: 77 MMHG | BODY MASS INDEX: 30.13 KG/M2 | WEIGHT: 192 LBS | TEMPERATURE: 97.7 F | HEIGHT: 67 IN

## 2020-07-06 PROCEDURE — 99203 OFFICE O/P NEW LOW 30 MIN: CPT | Performed by: SURGERY

## 2020-07-06 NOTE — Clinical Note
Thanks for the referral!  Earnest Dodson is a pio lady who would benefit from breast reduction. Will submit to insurance and schedule. If you have any questions or concerns, please do not hesitate to contact me anytime at your earliest convenience either by Epic or phone ([phone]). Thanks!  George Mathew

## 2020-07-06 NOTE — PROGRESS NOTES
Vitamins-Minerals (MULTIVITAL PO) Take by mouth      cetirizine (ZYRTEC) 10 MG tablet Take 10 mg by mouth daily       No current facility-administered medications for this visit. ROS   Constitutional: Negative for chills and fever. HENT: Negative for congestion, facial swelling, and voice change. Eyes: Negative for photophobia and visual disturbance. Respiratory: Negative for apnea, cough, chest tightness and shortness of breath. Cardiovascular: Negative for chest pain and palpitations. Gastrointestinal: Negative for dysphagia and early satiety. Genitourinary: Negative for difficulty urinating, dysuria, flank pain, frequency and hematuria. Musculoskeletal: Negative for new gait problem, joint swelling and myalgias. Skin: Negative for color change, pallor and rash. Endocrine: negative for tremors, temperature intolerance or polydipsia. Allergic/Immunologic: Negative for new environmental or food allergies. Neurological: Negative for dizziness, seizures, speech difficulty, numbness. Hematological: Negative for adenopathy. Psychiatric/Behavioral: Negative for agitation and confusion.       EXAM      /77 (Site: Left Upper Arm, Position: Sitting, Cuff Size: Large Adult)   Pulse 89   Temp 97.7 °F (36.5 °C) (Temporal)   Ht 5' 7\" (1.702 m)   Wt 192 lb (87.1 kg)   LMP  (LMP Unknown)   SpO2 98%   BMI 30.07 kg/m²     GEN: NAD, pleasant, obese  CVS: RRR  PULM: No respiratory distress  HEENT: PERRLA/EOMI; dentition (wearing mask), hearing appears within normal limits  NECK: Supple with trachea in midline, no masses  EXT: No lymphedema noted  ABD: soft/NT/obese   NEURO: No focal deficits, no obvious CN deficits  BACK: Bilateral latiss muscle intact  BREAST: Right larger than Left   R  Ptosis rdgrdrrdarddrderd:rd rd3rd Palpable masses: No     Nipple retraction: No     Palpable axillary lymphadenopathy: No     SN-N: 28.9 cm     N-IMF: 9.3 cm     Breast width: 14.9 cm     Excess axillary breast tissue noted         L  Ptosis stgstrstastdstest:st st1st Palpable masses: No     Nipple retraction: No     Palpable axillary lymphadenopathy: No     SN-N: 28.5 cm     N-IMF: 9 cm     Breast width: 15.2 cm     Excess axillary breast tissue noted       Estimated Reduction Volume (Schnur scale): 620 grams (each)    RADIOLOGY: Reviewed    IMP: 54 y.o. female with symptomatic macromastia  PLAN:Would benefit from bilateral reductions. May additionally benefit from liposuction to the superior aspects for improvement in contour. Will submit to insurance and schedule. A discussion regarding surgical options including: reduction mammaplasty was performed with the patient. Her symptoms of macromastia were discussed in detail and that surgical intervention is focused primarily on relieving upper torso complaints. Clinical photos were obtained. Additionally,discussion regarding the risks including, but not limited to: bleeding (potentially requiring transfusion or reoperation), infection, seroma, reoperation, poor cosmetic outcome, scarring, revisional surgery, nipple loss/complication, nipple malposition, diminished sensation, inability to breastfeed, VTE (DVT/PE), and death was performed. All questions were answered in a satisfactory manner. The patient was counseled at length about the risks of ellyn Covid-19 during their perioperative period and any recovery window from their procedure. The patient was made aware that ellyn Covid-19  may worsen their prognosis for recovering from their procedure  and lend to a higher morbidity and/or mortality risk. All material risks, benefits, and reasonable alternatives including postponing the procedure were discussed. The patient does wish to proceed with the procedure at this time[place]  [address] [facility]  07/06/20

## 2020-08-03 ENCOUNTER — TELEPHONE (OUTPATIENT)
Dept: SURGERY | Age: 55
End: 2020-08-03

## 2020-08-03 NOTE — TELEPHONE ENCOUNTER
Pt saw Dr Suresh Cordova on July 6 for breast reduction  She would like to know what the next steps will be    Please call

## 2020-08-20 NOTE — TELEPHONE ENCOUNTER
[facility] called and states that they need an addendum  to a clinical note adding if patient used OTC NSAIDS for pain relief. OTC or prescription antifungals. This can be emailed to [email or web address] please ref case # [id number].

## 2020-08-21 ENCOUNTER — TELEPHONE (OUTPATIENT)
Dept: SURGERY | Age: 55
End: 2020-08-21

## 2020-08-21 NOTE — TELEPHONE ENCOUNTER
Has patient tried OTC NSAIDS for back relief due to macromastia? Yes she has without relief. Has patient tried OTC antifungal creams to relieve rash symptoms from macromastia? Yes she has without relief.     Bree Deng RN

## 2020-08-31 ENCOUNTER — TELEPHONE (OUTPATIENT)
Dept: SURGERY | Age: 55
End: 2020-08-31

## 2020-08-31 NOTE — TELEPHONE ENCOUNTER
Patient seen on 7/6/2020:        IMP: 54 y.o. female with symptomatic macromastia  PLAN:Would benefit from bilateral reductions. May additionally benefit from liposuction to the superior aspects for improvement in contour. Will submit to insurance and schedule. Surgical order received. 3.5 hours of OR time needed. BBR approved via [employer] 10/22/20-12/31/2020 auth # [id number]    LM for patient to call office to discuss scheduling.

## 2020-08-31 NOTE — TELEPHONE ENCOUNTER
Time held on 10/29/2020 at 1130am.    Patient paperwork with preop physical and COVID testing mailed to patient. Routed surgery letter in [place]. Patient is interested in the liposuction to underarm area. Routing to management for quote.

## 2020-09-08 RX ORDER — SIMVASTATIN 40 MG
TABLET ORAL
Qty: 30 TABLET | Refills: 0 | Status: SHIPPED | OUTPATIENT
Start: 2020-09-08 | End: 2020-09-14 | Stop reason: SDUPTHER

## 2020-09-08 NOTE — TELEPHONE ENCOUNTER
----- Message from Bertha Peralta sent at 9/8/2020  2:10 PM EDT -----  Subject: Refill Request    QUESTIONS  Name of Medication? simvastatin (ZOCOR) 40 MG tablet  Patient-reported dosage and instructions? 1 tab daily  How many days do you have left? 12  Preferred Pharmacy? [facility] [facility] [address] [place] [address]   [phone]  Pharmacy phone number (if available)? [phone]  Additional Information for Provider? Patient need a refill for medication   she only has 12 left pharmacy denied her refill says she needs to schedule   an appointment. Her appointment is scheduled for 10/2  ---------------------------------------------------------------------------  --------------  CALL BACK INFO  What is the best way for the office to contact you? OK to leave message on   voicemail  Preferred Call Back Phone Number?  [phone]

## 2020-09-14 RX ORDER — SIMVASTATIN 40 MG
TABLET ORAL
Qty: 90 TABLET | Refills: 3 | Status: SHIPPED | OUTPATIENT
Start: 2020-09-14 | End: 2021-07-08

## 2020-09-28 RX ORDER — CARBIDOPA/LEVODOPA 25MG-250MG
TABLET ORAL
Qty: 90 TABLET | Refills: 3 | Status: SHIPPED | OUTPATIENT
Start: 2020-09-28 | End: 2021-09-27 | Stop reason: SDUPTHER

## 2020-10-02 ENCOUNTER — OFFICE VISIT (OUTPATIENT)
Dept: PRIMARY CARE CLINIC | Age: 55
End: 2020-10-02
Payer: COMMERCIAL

## 2020-10-02 VITALS
SYSTOLIC BLOOD PRESSURE: 103 MMHG | WEIGHT: 185 LBS | DIASTOLIC BLOOD PRESSURE: 73 MMHG | TEMPERATURE: 97.1 F | HEART RATE: 86 BPM | BODY MASS INDEX: 28.98 KG/M2

## 2020-10-02 DIAGNOSIS — E78.00 HYPERCHOLESTEREMIA: ICD-10-CM

## 2020-10-02 DIAGNOSIS — Z01.818 PREOP TESTING: ICD-10-CM

## 2020-10-02 LAB
A/G RATIO: 1.9 (ref 1.1–2.2)
ALBUMIN SERPL-MCNC: 4.4 G/DL (ref 3.4–5)
ALP BLD-CCNC: 88 U/L (ref 40–129)
ALT SERPL-CCNC: 17 U/L (ref 10–40)
ANION GAP SERPL CALCULATED.3IONS-SCNC: 10 MMOL/L (ref 3–16)
APTT: 36.8 SEC (ref 24.2–36.2)
AST SERPL-CCNC: 22 U/L (ref 15–37)
BASOPHILS ABSOLUTE: 0.1 K/UL (ref 0–0.2)
BASOPHILS RELATIVE PERCENT: 0.9 %
BILIRUB SERPL-MCNC: <0.2 MG/DL (ref 0–1)
BUN BLDV-MCNC: 17 MG/DL (ref 7–20)
CALCIUM SERPL-MCNC: 9.3 MG/DL (ref 8.3–10.6)
CHLORIDE BLD-SCNC: 103 MMOL/L (ref 99–110)
CHOLESTEROL, TOTAL: 229 MG/DL (ref 0–199)
CO2: 28 MMOL/L (ref 21–32)
CREAT SERPL-MCNC: 0.9 MG/DL (ref 0.6–1.1)
EOSINOPHILS ABSOLUTE: 0.3 K/UL (ref 0–0.6)
EOSINOPHILS RELATIVE PERCENT: 4.3 %
GFR AFRICAN AMERICAN: >60
GFR NON-AFRICAN AMERICAN: >60
GLOBULIN: 2.3 G/DL
GLUCOSE BLD-MCNC: 103 MG/DL (ref 70–99)
HCT VFR BLD CALC: 44.1 % (ref 36–48)
HDLC SERPL-MCNC: 46 MG/DL (ref 40–60)
HEMOGLOBIN: 14.8 G/DL (ref 12–16)
INR BLD: 0.98 (ref 0.86–1.14)
LDL CHOLESTEROL CALCULATED: 150 MG/DL
LYMPHOCYTES ABSOLUTE: 1.7 K/UL (ref 1–5.1)
LYMPHOCYTES RELATIVE PERCENT: 26.5 %
MCH RBC QN AUTO: 30.7 PG (ref 26–34)
MCHC RBC AUTO-ENTMCNC: 33.6 G/DL (ref 31–36)
MCV RBC AUTO: 91.4 FL (ref 80–100)
MONOCYTES ABSOLUTE: 0.6 K/UL (ref 0–1.3)
MONOCYTES RELATIVE PERCENT: 8.8 %
NEUTROPHILS ABSOLUTE: 3.9 K/UL (ref 1.7–7.7)
NEUTROPHILS RELATIVE PERCENT: 59.5 %
PDW BLD-RTO: 13.3 % (ref 12.4–15.4)
PLATELET # BLD: 312 K/UL (ref 135–450)
PMV BLD AUTO: 8.2 FL (ref 5–10.5)
POTASSIUM SERPL-SCNC: 4.5 MMOL/L (ref 3.5–5.1)
PROTHROMBIN TIME: 11.4 SEC (ref 10–13.2)
RBC # BLD: 4.82 M/UL (ref 4–5.2)
SODIUM BLD-SCNC: 141 MMOL/L (ref 136–145)
TOTAL PROTEIN: 6.7 G/DL (ref 6.4–8.2)
TRIGL SERPL-MCNC: 164 MG/DL (ref 0–150)
VLDLC SERPL CALC-MCNC: 33 MG/DL
WBC # BLD: 6.6 K/UL (ref 4–11)

## 2020-10-02 PROCEDURE — 90471 IMMUNIZATION ADMIN: CPT | Performed by: FAMILY MEDICINE

## 2020-10-02 PROCEDURE — 90686 IIV4 VACC NO PRSV 0.5 ML IM: CPT | Performed by: FAMILY MEDICINE

## 2020-10-02 PROCEDURE — 93000 ELECTROCARDIOGRAM COMPLETE: CPT | Performed by: FAMILY MEDICINE

## 2020-10-02 PROCEDURE — 99243 OFF/OP CNSLTJ NEW/EST LOW 30: CPT | Performed by: FAMILY MEDICINE

## 2020-10-02 RX ORDER — PRAMIPEXOLE DIHYDROCHLORIDE 0.25 MG/1
0.25 TABLET ORAL NIGHTLY
Qty: 90 TABLET | Refills: 3 | Status: SHIPPED | OUTPATIENT
Start: 2020-10-02 | End: 2021-03-17

## 2020-10-02 ASSESSMENT — ENCOUNTER SYMPTOMS
SHORTNESS OF BREATH: 0
COUGH: 0
CONSTIPATION: 0
NAUSEA: 0
COLOR CHANGE: 0
VOMITING: 0
SORE THROAT: 0
EYE PAIN: 0
DIARRHEA: 0
RHINORRHEA: 0
ABDOMINAL PAIN: 0

## 2020-10-02 ASSESSMENT — PATIENT HEALTH QUESTIONNAIRE - PHQ9
6. FEELING BAD ABOUT YOURSELF - OR THAT YOU ARE A FAILURE OR HAVE LET YOURSELF OR YOUR FAMILY DOWN: 0
9. THOUGHTS THAT YOU WOULD BE BETTER OFF DEAD, OR OF HURTING YOURSELF: 0
SUM OF ALL RESPONSES TO PHQ QUESTIONS 1-9: 5
5. POOR APPETITE OR OVEREATING: 1
2. FEELING DOWN, DEPRESSED OR HOPELESS: 0
3. TROUBLE FALLING OR STAYING ASLEEP: 3
10. IF YOU CHECKED OFF ANY PROBLEMS, HOW DIFFICULT HAVE THESE PROBLEMS MADE IT FOR YOU TO DO YOUR WORK, TAKE CARE OF THINGS AT HOME, OR GET ALONG WITH OTHER PEOPLE: 3
SUM OF ALL RESPONSES TO PHQ QUESTIONS 1-9: 5
1. LITTLE INTEREST OR PLEASURE IN DOING THINGS: 0
8. MOVING OR SPEAKING SO SLOWLY THAT OTHER PEOPLE COULD HAVE NOTICED. OR THE OPPOSITE, BEING SO FIGETY OR RESTLESS THAT YOU HAVE BEEN MOVING AROUND A LOT MORE THAN USUAL: 0
SUM OF ALL RESPONSES TO PHQ9 QUESTIONS 1 & 2: 0
4. FEELING TIRED OR HAVING LITTLE ENERGY: 0
7. TROUBLE CONCENTRATING ON THINGS, SUCH AS READING THE NEWSPAPER OR WATCHING TELEVISION: 1

## 2020-10-02 NOTE — PROGRESS NOTES
PRAMIPEXOLE (MIRAPEX) 0.25 MG TABLET    Take 1 tablet by mouth nightly     Current Outpatient Medications   Medication Sig Dispense Refill    carbidopa-levodopa (SINEMET)  MG per tablet TAKE 1 TABLET BY MOUTH ONE TIME A DAY 90 tablet 3    simvastatin (ZOCOR) 40 MG tablet TAKE 1 TABLET BY MOUTH NIGHTLY 90 tablet 3    venlafaxine (EFFEXOR XR) 75 MG extended release capsule TAKE 1 CAPSULE BY MOUTH ONE TIME A DAY 90 capsule 3    Multiple Vitamins-Minerals (MULTIVITAL PO) Take by mouth      cetirizine (ZYRTEC) 10 MG tablet Take 10 mg by mouth daily       No current facility-administered medications for this visit.         Past Medical History:   Diagnosis Date    Anxiety     Hypercholesteremia     Obstructive sleep apnea (adult) (pediatric)     APAP 10-18 (ave 12 cwp)    Restless leg      Past Surgical History:   Procedure Laterality Date    ACHILLES TENDON SURGERY Right     CHOLECYSTECTOMY      COLONOSCOPY N/A 2020    COLORECTAL CANCER SCREENING, HIGH RISK performed by Nataly Sofia MD at [address] [place] [address]      DUB:  Dr. Shiloh He still in   [place] [id number]     Family History   Problem Relation Age of Onset    Other Mother     Heart Attack Father 46         at 66    Hypertension Father     Other Brother         bipolar     Social History     Tobacco Use    Smoking status: Former Smoker     Packs/day: 0.25     Years: 22.00     Pack years: 5.50     Types: Cigarettes     Last attempt to quit: 2018     Years since quittin.2    Smokeless tobacco: Never Used    Tobacco comment: contemplative, in process of quitting , discussed with patient   Substance Use Topics    Alcohol use: Yes     Comment: occasionally    Drug use: No       Objective   /73   Pulse 86   Temp 97.1 °F (36.2 °C) (Tympanic)   Wt 185 lb (83.9 kg)   LMP  (LMP Unknown)   Breastfeeding No   BMI 28.98 kg/m²   Wt Readings from Last 3 Encounters:   10/02/20 185 lb (83.9 kg)   07/06/20 192 lb (87.1 kg)   06/11/20 191 lb 2 oz (86.7 kg)       Physical Exam  Constitutional:       Appearance: She is well-developed. HENT:      Head: Normocephalic and atraumatic. Nose: Nose normal.      Mouth/Throat:      Pharynx: No oropharyngeal exudate. Eyes:      General: No scleral icterus. Right eye: No discharge. Left eye: No discharge. Pupils: Pupils are equal, round, and reactive to light. Neck:      Musculoskeletal: Normal range of motion and neck supple. Thyroid: No thyromegaly. Cardiovascular:      Rate and Rhythm: Normal rate and regular rhythm. Pulses:           Dorsalis pedis pulses are 2+ on the right side and 2+ on the left side. Posterior tibial pulses are 2+ on the right side and 2+ on the left side. Heart sounds: Normal heart sounds. No murmur. No friction rub. No gallop. Comments: No Edema Lower Extremities  Pulmonary:      Effort: Pulmonary effort is normal.      Breath sounds: Normal breath sounds. No wheezing or rales. Abdominal:      General: Bowel sounds are normal. There is no distension. Palpations: Abdomen is soft. There is no hepatomegaly or splenomegaly. Tenderness: There is no abdominal tenderness. There is no guarding or rebound. Musculoskeletal: Normal range of motion. General: No tenderness or deformity. Lymphadenopathy:      Cervical: No cervical adenopathy. Skin:     General: Skin is warm and dry. Findings: No erythema or rash. Neurological:      Mental Status: She is alert. Cranial Nerves: No cranial nerve deficit. Sensory: No sensory deficit.       Gait: Gait normal.   Psychiatric:         Speech: Speech normal.         Behavior: Behavior normal.           Chemistry        Component Value Date/Time     07/23/2019 1100    K 4.6 07/23/2019 1100     07/23/2019 1100    CO2 28 07/23/2019 1100    BUN 17 07/23/2019 1100    CREATININE 1.1 07/23/2019 1100        Component Value Date/Time    CALCIUM 9.9 07/23/2019 1100    ALKPHOS 82 07/23/2019 1100    AST 26 07/23/2019 1100    ALT 24 07/23/2019 1100    BILITOT <0.2 07/23/2019 1100          Lab Results   Component Value Date    WBC 6.3 09/16/2016    HGB 14.1 09/16/2016    HCT 41.5 09/16/2016    MCV 92.9 09/16/2016     09/16/2016     No results found for: LABA1C  No results found for: EAG  No results found for: LABA1C  No components found for: CHLPL  Lab Results   Component Value Date    TRIG 130 07/23/2019    TRIG 164 05/23/2019    TRIG 202 (H) 04/09/2018     Lab Results   Component Value Date    HDL 59 07/23/2019    HDL 45 05/23/2019    HDL 47 04/09/2018     Lab Results   Component Value Date    LDLCALC 140 (H) 07/23/2019    LDLCALC 111 05/23/2019    LDLCALC 130 (H) 04/09/2018     Lab Results   Component Value Date    LABVLDL 26 07/23/2019    LABVLDL 40 04/09/2018    LABVLDL 22 02/03/2015     ECG normal sinus rhythm without ST or T wave abnormality    Assessment   Plan     1. Preop testing  Patient is cleared for surgery from my perspective. She is low risk for cardiovascular complication. I counseled her on avoidance of medications that slow coagulation which is aspirin.  - CBC Auto Differential; Future  - APTT  - Protime-INR  - EKG 12 Lead    2. Recurrent major depressive disorder, in partial remission (HCC)  Controlled: Appears stable. We will continue current management and monitor for adverse reaction and disease progression. Follow-up as noted below      3. Hypercholesteremia  Controlled: Appears stable. We will continue current management and monitor for adverse reaction and disease progression. Follow-up as noted below    - Lipid Panel; Future  - Comprehensive Metabolic Panel; Future    4. Obstructive sleep apnea  Controlled: Appears stable. We will continue current management and monitor for adverse reaction and disease progression. Follow-up as noted below      5.  Need for vaccination  Vaccinate  - INFLUENZA, QUADV, 3 YRS AND OLDER, IM PF, PREFILL SYR OR SDV, 0.5ML (AFLURIA QUADV, PF)    6. Restless leg  We will trial adding Mirapex and follow-up if not improved in the next month otherwise in 6-month  - pramipexole (MIRAPEX) 0.25 MG tablet; Take 1 tablet by mouth nightly  Dispense: 90 tablet; Refill: 3    Iris received counseling on the following healthy behaviors: nutrition and exercise    Patient given educational materials on Nutrition and Exercise    Discussed use, benefit, and side effects of prescribed medications. Barriers to medication compliance addressed. All patient questions answered. Pt voiced understanding.          Health Maintenance   Topic Date Due    Shingles Vaccine (1 of 2) 01/15/2015    Lipid screen  07/23/2020    DTaP/Tdap/Td vaccine (2 - Td) 07/06/2021    Breast cancer screen  10/25/2021    Colon cancer screen colonoscopy  06/11/2025    Flu vaccine  Completed    Hepatitis C screen  Completed    HIV screen  Completed    Hepatitis A vaccine  Aged Out    Hepatitis B vaccine  Aged Out    Hib vaccine  Aged Out    Meningococcal (ACWY) vaccine  Aged Out    Pneumococcal 0-64 years Vaccine  Aged Out       RTC 6 months and as needed

## 2020-10-02 NOTE — PROGRESS NOTES
Vaccine Information Sheet, \"Influenza - Inactivated\"  given to Shiloh Irizarry, or parent/legal guardian of  Shiloh Irizarry and verbalized understanding. Patient responses:    Have you ever had a reaction to a flu vaccine? No  Do you have any current illness? No  Have you ever had Guillian Bantam Syndrome? No  Do you have a serious allergy to any of the follow: Neomycin, Polymyxin, Thimerosal, eggs or egg products? No    Flu vaccine given per order. Please see immunization tab. Risks and benefits explained. Current VIS given.     PHQ-9 Total Score: 5 (10/2/2020  7:17 AM)  Thoughts that you would be better off dead, or of hurting yourself in some way: 0 (10/2/2020  7:17 AM)

## 2020-10-02 NOTE — PATIENT INSTRUCTIONS
Examine your lifestyle and the barriers to bad and good habits and how you can design your life to make better choices    If you want to feel better these are the FUNDAMENTAL PILLARS of Wellness:    Make it EASY to do the RIGHT THINGS. 1)  You can choose to Get 150 min/week of moderate exercise (can talk but can't sing) or 75 min/week of vigorous exercise (can't talk)   This will enhance your sense of well being (Exercise is as good as medicine for depression.)    2)  You can choose to Get 7-9 hours of sleep per night    Detoxifies your brain, reduces risk of dementia    3)  You can choose to Strength Train 2 x a week on non-consecutive days   This will improve function and reduce risk of injury. Body weight type exercises such as Yoga and Pilates are good    4)  You can choose good nutrition. Only eat your goal weight (in lbs) x 10 calories/day and get 5 servings of Vegetables/day   Plant based diets reduce risk of heart attack/stroke and will help you feel full on less food. Avoid highly processed foods and processed carbohydrates. 5)  You can choose moderate alcohol intake < 1-2 drinks/day   Alcohol will disrupt your sleep and add calories to your day    6)  You can choose to develop a Charismatic/Supportive relationship. This will strengthen your resilience for the ups and downs. 7)  You can choose to Practice Mindfulness. An hour a day of prayer/meditation/gratitude will change your life! If you are trying to lose weight, here are some recommendations for weight loss:  Not every weight loss program is appropriate for everybody. ..  good online sources include Noom, The GI Diet or \"Primal diet\", Intermittent fasting. If you have diabetes treated with insulin be sure to ask me for specific guidance around meals. Take your desired weight in pounds and multiply by 10 and that is your average daily calorie allowance.   For example if you wish to weigh 170 lb x 10 = 1700 lindy/day (this is how to gradually lose the weight and maintain your desired weight). Avoid soda/coke and all \"wet carbs\" => Drink ice water instead    Drink a large glass of ice water before meals and EAT SLOWLY (talk while you eat)! Rethink your hunger => it means your losing weight.     Minimize highly processed carbohydrates as they stimulate your appetite:  Specifically cut back on Bread, Rice, Pasta and Potatoes    Avoid eating calories after 6 pm

## 2020-10-21 NOTE — PROGRESS NOTES
[facility] / [facility]) [address] [facility], [address]    Acknowledgment of Informed Consent for Surgical or Medical Procedure and Sedation  I agree to allow doctor(s) Chloe Jara and his/her associates or assistants, including residents and/or other qualified medical practitioner to perform the following medical treatment or procedure and to administer or direct the administration of sedation as necessary:  Procedure(s): BILATERAL BREAST REDUCTION  My doctor has explained the following regarding the proposed procedure:   the explanation of the procedure   the benefits of the procedure   the potential problems that might occur during recuperation   the risks and side effects of the procedure which could include but are not limited to severe blood loss, infection, stroke or death   the benefits, risks and side effect of alternative procedures including the consequences of declining this procedure or any alternative procedures   the likelihood of achieving satisfactory results. I acknowledge no guarantee or assurance has been made to me regarding the results. I understand that during the course of this treatment/procedure, unforeseen conditions can occur which require an additional or different procedure. I agree to allow my physician or assistants to perform such extension of the original procedure as they may find necessary. I understand that sedation will often result in temporary impairment of memory and fine motor skills and that sedation can occasionally progress to a state of deep sedation or general anesthesia. I understand the risks of anesthesia for surgery include, but are not limited to, sore throat, hoarseness, injury to face, mouth, or teeth; nausea; headache; injury to blood vessels or nerves; death, brain damage, or paralysis.     I understand that if I have a Limitation of Treatment order in effect during my hospitalization, the order may or may not be in effect during this procedure. I give my doctor permission to give me blood or blood products. I understand that there are risks with receiving blood such as hepatitis, AIDS, fever, or allergic reaction. I acknowledge that the risks, benefits, and alternatives of this treatment have been explained to me and that no express or implied warranty has been given by the hospital, any blood bank, or any person or entity as to the blood or blood components transfused. At the discretion of my doctor, I agree to allow observers, equipment/product representatives and allow photographing, and/or televising of the procedure, provided my name or identity is maintained confidentially. I agree the hospital may dispose of or use for scientific or educational purposes any tissue, fluid, or body parts which may be removed.     ________________________________Date________Time______ am/pm  ([facility])  Patient or Signature of Closest Relative or Legal Guardian    ________________________________Date________Time______am/pm      Page 1 of  1  Witness

## 2020-10-23 ENCOUNTER — NURSE ONLY (OUTPATIENT)
Dept: PRIMARY CARE CLINIC | Age: 55
End: 2020-10-23
Payer: COMMERCIAL

## 2020-10-23 PROCEDURE — 99211 OFF/OP EST MAY X REQ PHY/QHP: CPT | Performed by: NURSE PRACTITIONER

## 2020-10-24 LAB — SARS-COV-2, NAA: NOT DETECTED

## 2020-10-26 NOTE — RESULT ENCOUNTER NOTE

## 2020-10-27 NOTE — PROGRESS NOTES
[facility] PRE-SURGICAL TESTING INSTRUCTIONS                              PRIOR TO PROCEDURE DATE:  1. Please follow any guidelines/instructions prior to your procedure as advised by your surgeon. 2. Arrange for someone to drive you home and be with you for the first 24 hours after discharge for your safety after your procedure for which you received sedation. Ensure it is someone we can share information with regarding your discharge. 3. You must contact your surgeon for instructions IF:   You are taking any blood thinners, aspirin, anti-inflammatory or vitamin E.   There is a change in your physical condition such as a cold, fever, rash, cuts, sores or any other infection, especially near your surgical site. 4. Do not drink alcohol the day before or day of your procedure. 5. A Pre-op History and Physical for surgery MUST be completed by your Physician or Urgent Care within 30 days of your procedure date. Please bring a copy with you on the day of your procedure and along with any other testing performed. THE DAY OF YOUR PROCEDURE:  1. Follow instructions for ARRIVAL TIME as DIRECTED BY YOUR SURGEON. I    2. Enter the MAIN entrance from [employer] and follow the signs to the free [facility] or [facility] parking (offered free of charge 6am-5pm). 3. Enter the Main Entrance of the hospital (do not enter from the lower level of the parking garage). Upon entrance, check in with the  at the main desk on your left. If no one is available at the desk, proceed into the [facility] Waiting Room and go through the door directly into the [facility]. There is a Check-in desk ACROSS from Room 5 (marked with a sign hanging from the ceiling). The phone number for the surgery center is [phone]. 4. Please call [phone] option #2 option #2 if you have not been preregistered yet. On the day of your procedure bring your insurance card and photo ID.  You will be registered at your bedside once brought back to your room. 5. DO NOT EAT ANYTHING eight hours prior to surgery. May have 8 ounces of water 4 hours prior to surgery. 6. MEDICATIONS    Take the following medications with a SMALL sip of water: none (patient states she normally takes all her medications at night)   Use your usual dose of inhalers the morning of surgery. BRING your rescue inhaler with you to hospital.    Anesthesia does NOT want you to take insulin the morning of surgery. They will control your blood sugar while you are at the hospital. Please contact your ordering physician for instructions regarding your insulin the night before your procedure. If you have an insulin pump, please keep it set on basal rate. 7. Do not swallow water when brushing teeth. No gum, candy, mints or ice chips. Refrain from smoking or at least decrease the amount. 8. Dress in loose, comfortable clothing appropriate for redressing after your procedure. Do not wear jewelry (including body piercings), make-up (especially NO eye make-up), fingernail polish (NO toenail polish if foot/leg surgery), lotion, powders or metal hairclips. 9. Dentures, glasses, or contacts will need to be removed before your procedure. Bring cases for your glasses, contacts, dentures, or hearing aids to protect them while you are in surgery. 10. If you use a CPAP, please bring it with you on the day of your procedure. 11. We recommend that valuable personal  belongings such as cash, cell phones, e-tablets or jewelry, be left at home during your stay. The hospital will not be responsible for valuables that are not secured in the hospital safe. However, if your insurance requires a co-pay, you may want to bring a method of payment, i.e. Check or credit card, if you wish to pay your co-pay the day of surgery. 12. If you are to stay overnight, you may bring a bag with personal items.  Please have any large items you may need brought in by your family after your arrival to your hospital room. 15. If you have a Living Will or Durable Power of , please bring a copy on the day of your procedure. 15. With your permission, one family member may accompany you while you are being prepared for surgery. Once you are ready, additional family members may join you. HOW WE KEEP YOU SAFE and WORK TO PREVENT SURGICAL SITE INFECTIONS:  1. Health care workers should always check your ID bracelet to verify your name and birth date. You will be asked many times to state your name, date of birth, and allergies. 2. Health care workers should always clean their hands with soap or alcohol gel before providing care to you. It is okay to ask anyone if they cleaned their hands before they touch you. 3. You will be actively involved in verifying the type of procedure you are having and ensuring the correct surgical site. This will be confirmed multiple times prior to your procedure. Do NOT bautista your surgery site UNLESS instructed to by your surgeon. 4. Do not shave or wax for 72 hours prior to procedure near your operative site. Shaving with a razor can irritate your skin and make it easier to develop an infection. On the day of your procedure, any hair that needs to be removed near the surgical site will be clipped by a healthcare worker using a special clippers designed to avoid skin irritation. 5. When you are in the operating room, your surgical site will be cleansed with a special soap, and in most cases, you will be given an antibiotic before the surgery begins. What to expect AFTER YOUR PROCEDURE:  1. Immediately following your procedure, your will be taken to the PACU for the first phase of your recovery. Your nurse will help you recover from any potential side effects of anesthesia, such as extreme drowsiness, changes in your vital signs or breathing patterns.  Nausea, headache, muscle aches, or sore throat may also occur after anesthesia. Your nurse will help you manage these potential side effects. 2. For comfort and safety, arrange to have someone at home with you for the first 24 hours after discharge. 3. You and your family will be given written instructions about your diet, activity, dressing care, medications, and return visits. 4. Once at home, should issues with nausea, pain, or bleeding occur, or should you notice any signs of infection, you should call your surgeon. 5. Always clean your hands before and after caring for your wound. Do not let your family touch your surgery site without cleaning their hands. 6. Narcotic pain medications can cause significant constipation. You may want to add a stool softener to your postoperative medication schedule or speak to your surgeon on how best to manage this SIDE EFFECT. SPECIAL INSTRUCTIONS reinforced to bring in cpap machine     Thank you for allowing us to care for you. We strive to exceed your expectations in the delivery of care and service provided to you and your family. If you need to contact us for any reason, please call us at [phone]    Instructions reviewed with patient during preadmission testing phone interview. Mily Fierro. 10/27/2020 .9:51 AM      ADDITIONAL EDUCATIONAL INFORMATION REVIEWED PER PHONE WITH YOU AND/OR YOUR FAMILY:  No Bring a urine sample on day of surgery  Yes Hibiclens® Bathing Instructions patient states she was told to use this; instructions given to patient.   No Antibacterial Soap

## 2020-10-28 ENCOUNTER — ANESTHESIA EVENT (OUTPATIENT)
Dept: OPERATING ROOM | Age: 55
End: 2020-10-28
Payer: COMMERCIAL

## 2020-10-29 ENCOUNTER — ANESTHESIA (OUTPATIENT)
Dept: OPERATING ROOM | Age: 55
End: 2020-10-29
Payer: COMMERCIAL

## 2020-10-29 ENCOUNTER — HOSPITAL ENCOUNTER (OUTPATIENT)
Age: 55
Setting detail: OUTPATIENT SURGERY
Discharge: HOME | End: 2020-10-29
Attending: SURGERY | Admitting: SURGERY
Payer: COMMERCIAL

## 2020-10-29 VITALS
OXYGEN SATURATION: 100 % | RESPIRATION RATE: 21 BRPM | DIASTOLIC BLOOD PRESSURE: 102 MMHG | TEMPERATURE: 97.3 F | SYSTOLIC BLOOD PRESSURE: 147 MMHG

## 2020-10-29 VITALS
WEIGHT: 190 LBS | OXYGEN SATURATION: 94 % | HEART RATE: 85 BPM | DIASTOLIC BLOOD PRESSURE: 61 MMHG | RESPIRATION RATE: 14 BRPM | HEIGHT: 67 IN | SYSTOLIC BLOOD PRESSURE: 98 MMHG | TEMPERATURE: 97.2 F | BODY MASS INDEX: 29.82 KG/M2

## 2020-10-29 PROCEDURE — 6370000000 HC RX 637 (ALT 250 FOR IP): Performed by: SURGERY

## 2020-10-29 PROCEDURE — 2580000003 HC RX 258: Performed by: SURGERY

## 2020-10-29 PROCEDURE — 6360000002 HC RX W HCPCS: Performed by: NURSE ANESTHETIST, CERTIFIED REGISTERED

## 2020-10-29 PROCEDURE — 15860 IV NJX TST VASC FLO FLAP/GRF: CPT | Performed by: SURGERY

## 2020-10-29 PROCEDURE — 3700000001 HC ADD 15 MINUTES (ANESTHESIA): Performed by: SURGERY

## 2020-10-29 PROCEDURE — 2580000003 HC RX 258: Performed by: ANESTHESIOLOGY

## 2020-10-29 PROCEDURE — 3600000004 HC SURGERY LEVEL 4 BASE: Performed by: SURGERY

## 2020-10-29 PROCEDURE — 88360 TUMOR IMMUNOHISTOCHEM/MANUAL: CPT

## 2020-10-29 PROCEDURE — 7100000010 HC PHASE II RECOVERY - FIRST 15 MIN: Performed by: SURGERY

## 2020-10-29 PROCEDURE — 6360000002 HC RX W HCPCS: Performed by: ANESTHESIOLOGY

## 2020-10-29 PROCEDURE — C1729 CATH, DRAINAGE: HCPCS | Performed by: SURGERY

## 2020-10-29 PROCEDURE — 88305 TISSUE EXAM BY PATHOLOGIST: CPT

## 2020-10-29 PROCEDURE — 2709999900 HC NON-CHARGEABLE SUPPLY: Performed by: SURGERY

## 2020-10-29 PROCEDURE — 7100000011 HC PHASE II RECOVERY - ADDTL 15 MIN: Performed by: SURGERY

## 2020-10-29 PROCEDURE — 2500000003 HC RX 250 WO HCPCS: Performed by: NURSE ANESTHETIST, CERTIFIED REGISTERED

## 2020-10-29 PROCEDURE — 19318 BREAST REDUCTION: CPT | Performed by: SURGERY

## 2020-10-29 PROCEDURE — 88342 IMHCHEM/IMCYTCHM 1ST ANTB: CPT

## 2020-10-29 PROCEDURE — 3700000000 HC ANESTHESIA ATTENDED CARE: Performed by: SURGERY

## 2020-10-29 PROCEDURE — 2500000003 HC RX 250 WO HCPCS: Performed by: SURGERY

## 2020-10-29 PROCEDURE — 6360000002 HC RX W HCPCS: Performed by: SURGERY

## 2020-10-29 PROCEDURE — 2720000010 HC SURG SUPPLY STERILE: Performed by: SURGERY

## 2020-10-29 PROCEDURE — 3600000014 HC SURGERY LEVEL 4 ADDTL 15MIN: Performed by: SURGERY

## 2020-10-29 PROCEDURE — 7100000001 HC PACU RECOVERY - ADDTL 15 MIN: Performed by: SURGERY

## 2020-10-29 PROCEDURE — 7100000000 HC PACU RECOVERY - FIRST 15 MIN: Performed by: SURGERY

## 2020-10-29 RX ORDER — ESMOLOL HYDROCHLORIDE 10 MG/ML
INJECTION INTRAVENOUS PRN
Status: DISCONTINUED | OUTPATIENT
Start: 2020-10-29 | End: 2020-10-29 | Stop reason: SDUPTHER

## 2020-10-29 RX ORDER — LIDOCAINE HYDROCHLORIDE 20 MG/ML
INJECTION, SOLUTION EPIDURAL; INFILTRATION; INTRACAUDAL; PERINEURAL PRN
Status: DISCONTINUED | OUTPATIENT
Start: 2020-10-29 | End: 2020-10-29 | Stop reason: SDUPTHER

## 2020-10-29 RX ORDER — ROCURONIUM BROMIDE 10 MG/ML
INJECTION, SOLUTION INTRAVENOUS PRN
Status: DISCONTINUED | OUTPATIENT
Start: 2020-10-29 | End: 2020-10-29 | Stop reason: SDUPTHER

## 2020-10-29 RX ORDER — CEFAZOLIN SODIUM 2 G/50ML
2 SOLUTION INTRAVENOUS ONCE
Status: COMPLETED | OUTPATIENT
Start: 2020-10-29 | End: 2020-10-29

## 2020-10-29 RX ORDER — FENTANYL CITRATE 50 UG/ML
50 INJECTION, SOLUTION INTRAMUSCULAR; INTRAVENOUS EVERY 5 MIN PRN
Status: DISCONTINUED | OUTPATIENT
Start: 2020-10-29 | End: 2020-10-29 | Stop reason: HOSPADM

## 2020-10-29 RX ORDER — ONDANSETRON 2 MG/ML
4 INJECTION INTRAMUSCULAR; INTRAVENOUS ONCE
Status: COMPLETED | OUTPATIENT
Start: 2020-10-29 | End: 2020-10-29

## 2020-10-29 RX ORDER — ENALAPRILAT 2.5 MG/2ML
1.25 INJECTION INTRAVENOUS
Status: DISCONTINUED | OUTPATIENT
Start: 2020-10-29 | End: 2020-10-29 | Stop reason: HOSPADM

## 2020-10-29 RX ORDER — ONDANSETRON 2 MG/ML
4 INJECTION INTRAMUSCULAR; INTRAVENOUS
Status: COMPLETED | OUTPATIENT
Start: 2020-10-29 | End: 2020-10-29

## 2020-10-29 RX ORDER — OXYCODONE HYDROCHLORIDE AND ACETAMINOPHEN 5; 325 MG/1; MG/1
1 TABLET ORAL EVERY 6 HOURS PRN
Qty: 28 TABLET | Refills: 0 | Status: SHIPPED | OUTPATIENT
Start: 2020-10-29 | End: 2020-11-05

## 2020-10-29 RX ORDER — LIDOCAINE HYDROCHLORIDE 10 MG/ML
1 INJECTION, SOLUTION EPIDURAL; INFILTRATION; INTRACAUDAL; PERINEURAL
Status: DISCONTINUED | OUTPATIENT
Start: 2020-10-29 | End: 2020-10-29 | Stop reason: HOSPADM

## 2020-10-29 RX ORDER — MAGNESIUM HYDROXIDE 1200 MG/15ML
LIQUID ORAL CONTINUOUS PRN
Status: COMPLETED | OUTPATIENT
Start: 2020-10-29 | End: 2020-10-29

## 2020-10-29 RX ORDER — FENTANYL CITRATE 50 UG/ML
25 INJECTION, SOLUTION INTRAMUSCULAR; INTRAVENOUS EVERY 5 MIN PRN
Status: DISCONTINUED | OUTPATIENT
Start: 2020-10-29 | End: 2020-10-29 | Stop reason: HOSPADM

## 2020-10-29 RX ORDER — SODIUM CHLORIDE 0.9 % (FLUSH) 0.9 %
10 SYRINGE (ML) INJECTION EVERY 12 HOURS SCHEDULED
Status: DISCONTINUED | OUTPATIENT
Start: 2020-10-29 | End: 2020-10-29 | Stop reason: HOSPADM

## 2020-10-29 RX ORDER — MIDAZOLAM HYDROCHLORIDE 1 MG/ML
INJECTION INTRAMUSCULAR; INTRAVENOUS PRN
Status: DISCONTINUED | OUTPATIENT
Start: 2020-10-29 | End: 2020-10-29 | Stop reason: SDUPTHER

## 2020-10-29 RX ORDER — SODIUM CHLORIDE, SODIUM LACTATE, POTASSIUM CHLORIDE, CALCIUM CHLORIDE 600; 310; 30; 20 MG/100ML; MG/100ML; MG/100ML; MG/100ML
INJECTION, SOLUTION INTRAVENOUS CONTINUOUS
Status: DISCONTINUED | OUTPATIENT
Start: 2020-10-29 | End: 2020-10-29 | Stop reason: HOSPADM

## 2020-10-29 RX ORDER — PROPOFOL 10 MG/ML
INJECTION, EMULSION INTRAVENOUS PRN
Status: DISCONTINUED | OUTPATIENT
Start: 2020-10-29 | End: 2020-10-29 | Stop reason: SDUPTHER

## 2020-10-29 RX ORDER — HYDROMORPHONE HCL 110MG/55ML
PATIENT CONTROLLED ANALGESIA SYRINGE INTRAVENOUS PRN
Status: DISCONTINUED | OUTPATIENT
Start: 2020-10-29 | End: 2020-10-29 | Stop reason: SDUPTHER

## 2020-10-29 RX ORDER — CEPHALEXIN 500 MG/1
500 CAPSULE ORAL 4 TIMES DAILY
Qty: 40 CAPSULE | Refills: 0 | Status: SHIPPED | OUTPATIENT
Start: 2020-10-29 | End: 2020-11-08

## 2020-10-29 RX ORDER — HYDRALAZINE HYDROCHLORIDE 20 MG/ML
5 INJECTION INTRAMUSCULAR; INTRAVENOUS EVERY 5 MIN PRN
Status: DISCONTINUED | OUTPATIENT
Start: 2020-10-29 | End: 2020-10-29 | Stop reason: HOSPADM

## 2020-10-29 RX ORDER — SODIUM CHLORIDE 0.9 % (FLUSH) 0.9 %
10 SYRINGE (ML) INJECTION PRN
Status: DISCONTINUED | OUTPATIENT
Start: 2020-10-29 | End: 2020-10-29 | Stop reason: HOSPADM

## 2020-10-29 RX ORDER — LABETALOL 20 MG/4 ML (5 MG/ML) INTRAVENOUS SYRINGE
5 EVERY 10 MIN PRN
Status: DISCONTINUED | OUTPATIENT
Start: 2020-10-29 | End: 2020-10-29 | Stop reason: HOSPADM

## 2020-10-29 RX ORDER — FENTANYL CITRATE 50 UG/ML
INJECTION, SOLUTION INTRAMUSCULAR; INTRAVENOUS PRN
Status: DISCONTINUED | OUTPATIENT
Start: 2020-10-29 | End: 2020-10-29 | Stop reason: SDUPTHER

## 2020-10-29 RX ADMIN — ROCURONIUM BROMIDE 100 MG: 10 INJECTION INTRAVENOUS at 11:13

## 2020-10-29 RX ADMIN — SUGAMMADEX 172 MG: 100 INJECTION, SOLUTION INTRAVENOUS at 13:54

## 2020-10-29 RX ADMIN — ONDANSETRON 4 MG: 2 INJECTION INTRAMUSCULAR; INTRAVENOUS at 18:09

## 2020-10-29 RX ADMIN — SODIUM CHLORIDE, SODIUM LACTATE, POTASSIUM CHLORIDE, AND CALCIUM CHLORIDE: 600; 310; 30; 20 INJECTION, SOLUTION INTRAVENOUS at 13:53

## 2020-10-29 RX ADMIN — TRANEXAMIC ACID 1 G: 1 INJECTION, SOLUTION INTRAVENOUS at 11:41

## 2020-10-29 RX ADMIN — SODIUM CHLORIDE, SODIUM LACTATE, POTASSIUM CHLORIDE, AND CALCIUM CHLORIDE: 600; 310; 30; 20 INJECTION, SOLUTION INTRAVENOUS at 10:19

## 2020-10-29 RX ADMIN — CEFAZOLIN SODIUM 2 G: 2 SOLUTION INTRAVENOUS at 11:21

## 2020-10-29 RX ADMIN — FENTANYL CITRATE 100 MCG: 50 INJECTION INTRAMUSCULAR; INTRAVENOUS at 11:13

## 2020-10-29 RX ADMIN — MIDAZOLAM HYDROCHLORIDE 2 MG: 2 INJECTION, SOLUTION INTRAMUSCULAR; INTRAVENOUS at 11:04

## 2020-10-29 RX ADMIN — CEFAZOLIN SODIUM 2 G: 2 SOLUTION INTRAVENOUS at 14:33

## 2020-10-29 RX ADMIN — LIDOCAINE HYDROCHLORIDE 100 MG: 20 INJECTION, SOLUTION EPIDURAL; INFILTRATION; INTRACAUDAL; PERINEURAL at 11:13

## 2020-10-29 RX ADMIN — SODIUM CHLORIDE, SODIUM LACTATE, POTASSIUM CHLORIDE, AND CALCIUM CHLORIDE: 600; 310; 30; 20 INJECTION, SOLUTION INTRAVENOUS at 11:42

## 2020-10-29 RX ADMIN — FENTANYL CITRATE 100 MCG: 50 INJECTION INTRAMUSCULAR; INTRAVENOUS at 11:57

## 2020-10-29 RX ADMIN — ONDANSETRON 4 MG: 2 INJECTION INTRAMUSCULAR; INTRAVENOUS at 16:39

## 2020-10-29 RX ADMIN — HYDROMORPHONE HYDROCHLORIDE 1 MG: 2 INJECTION, SOLUTION INTRAMUSCULAR; INTRAVENOUS; SUBCUTANEOUS at 14:28

## 2020-10-29 RX ADMIN — ESMOLOL HYDROCHLORIDE 30 MG: 10 INJECTION, SOLUTION INTRAVENOUS at 14:36

## 2020-10-29 RX ADMIN — SODIUM CHLORIDE, SODIUM LACTATE, POTASSIUM CHLORIDE, AND CALCIUM CHLORIDE: 600; 310; 30; 20 INJECTION, SOLUTION INTRAVENOUS at 12:21

## 2020-10-29 RX ADMIN — HYDROMORPHONE HYDROCHLORIDE 1 MG: 2 INJECTION, SOLUTION INTRAMUSCULAR; INTRAVENOUS; SUBCUTANEOUS at 14:10

## 2020-10-29 RX ADMIN — PHENYLEPHRINE HYDROCHLORIDE 100 MCG: 10 INJECTION, SOLUTION INTRAMUSCULAR; INTRAVENOUS; SUBCUTANEOUS at 12:04

## 2020-10-29 RX ADMIN — PROPOFOL 160 MG: 10 INJECTION, EMULSION INTRAVENOUS at 11:13

## 2020-10-29 ASSESSMENT — PULMONARY FUNCTION TESTS
PIF_VALUE: 19
PIF_VALUE: 14
PIF_VALUE: 14
PIF_VALUE: 19
PIF_VALUE: 22
PIF_VALUE: 18
PIF_VALUE: 18
PIF_VALUE: 19
PIF_VALUE: 18
PIF_VALUE: 14
PIF_VALUE: 19
PIF_VALUE: 19
PIF_VALUE: 14
PIF_VALUE: 19
PIF_VALUE: 18
PIF_VALUE: 20
PIF_VALUE: 19
PIF_VALUE: 14
PIF_VALUE: 14
PIF_VALUE: 23
PIF_VALUE: 24
PIF_VALUE: 19
PIF_VALUE: 18
PIF_VALUE: 14
PIF_VALUE: 20
PIF_VALUE: 19
PIF_VALUE: 15
PIF_VALUE: 14
PIF_VALUE: 14
PIF_VALUE: 18
PIF_VALUE: 19
PIF_VALUE: 14
PIF_VALUE: 14
PIF_VALUE: 19
PIF_VALUE: 14
PIF_VALUE: 19
PIF_VALUE: 2
PIF_VALUE: 18
PIF_VALUE: 18
PIF_VALUE: 14
PIF_VALUE: 15
PIF_VALUE: 19
PIF_VALUE: 18
PIF_VALUE: 19
PIF_VALUE: 16
PIF_VALUE: 19
PIF_VALUE: 18
PIF_VALUE: 20
PIF_VALUE: 19
PIF_VALUE: 18
PIF_VALUE: 19
PIF_VALUE: 19
PIF_VALUE: 14
PIF_VALUE: 14
PIF_VALUE: 18
PIF_VALUE: 16
PIF_VALUE: 15
PIF_VALUE: 2
PIF_VALUE: 0
PIF_VALUE: 18
PIF_VALUE: 15
PIF_VALUE: 14
PIF_VALUE: 14
PIF_VALUE: 19
PIF_VALUE: 18
PIF_VALUE: 14
PIF_VALUE: 19
PIF_VALUE: 19
PIF_VALUE: 22
PIF_VALUE: 18
PIF_VALUE: 18
PIF_VALUE: 19
PIF_VALUE: 17
PIF_VALUE: 19
PIF_VALUE: 7
PIF_VALUE: 7
PIF_VALUE: 18
PIF_VALUE: 19
PIF_VALUE: 14
PIF_VALUE: 14
PIF_VALUE: 19
PIF_VALUE: 14
PIF_VALUE: 19
PIF_VALUE: 19
PIF_VALUE: 14
PIF_VALUE: 19
PIF_VALUE: 15
PIF_VALUE: 19
PIF_VALUE: 18
PIF_VALUE: 15
PIF_VALUE: 18
PIF_VALUE: 14
PIF_VALUE: 21
PIF_VALUE: 19
PIF_VALUE: 17
PIF_VALUE: 14
PIF_VALUE: 16
PIF_VALUE: 19
PIF_VALUE: 19
PIF_VALUE: 16
PIF_VALUE: 19
PIF_VALUE: 19
PIF_VALUE: 20
PIF_VALUE: 19
PIF_VALUE: 17
PIF_VALUE: 19
PIF_VALUE: 14
PIF_VALUE: 0
PIF_VALUE: 18
PIF_VALUE: 19
PIF_VALUE: 14
PIF_VALUE: 16
PIF_VALUE: 19
PIF_VALUE: 20
PIF_VALUE: 19
PIF_VALUE: 1
PIF_VALUE: 16
PIF_VALUE: 19
PIF_VALUE: 16
PIF_VALUE: 21
PIF_VALUE: 14
PIF_VALUE: 19
PIF_VALUE: 14
PIF_VALUE: 19
PIF_VALUE: 19
PIF_VALUE: 22
PIF_VALUE: 19
PIF_VALUE: 17
PIF_VALUE: 19
PIF_VALUE: 16
PIF_VALUE: 19
PIF_VALUE: 17
PIF_VALUE: 19
PIF_VALUE: 21
PIF_VALUE: 14
PIF_VALUE: 18
PIF_VALUE: 18
PIF_VALUE: 14
PIF_VALUE: 19
PIF_VALUE: 18
PIF_VALUE: 19
PIF_VALUE: 16
PIF_VALUE: 14
PIF_VALUE: 1
PIF_VALUE: 31
PIF_VALUE: 17
PIF_VALUE: 19
PIF_VALUE: 21
PIF_VALUE: 19
PIF_VALUE: 14
PIF_VALUE: 19
PIF_VALUE: 18
PIF_VALUE: 19
PIF_VALUE: 18
PIF_VALUE: 19
PIF_VALUE: 19
PIF_VALUE: 20
PIF_VALUE: 17
PIF_VALUE: 18
PIF_VALUE: 19
PIF_VALUE: 14
PIF_VALUE: 19
PIF_VALUE: 1
PIF_VALUE: 19
PIF_VALUE: 18
PIF_VALUE: 19
PIF_VALUE: 14
PIF_VALUE: 19
PIF_VALUE: 17
PIF_VALUE: 16
PIF_VALUE: 14
PIF_VALUE: 19
PIF_VALUE: 19
PIF_VALUE: 15
PIF_VALUE: 19
PIF_VALUE: 15
PIF_VALUE: 14
PIF_VALUE: 19
PIF_VALUE: 15
PIF_VALUE: 18
PIF_VALUE: 19
PIF_VALUE: 14
PIF_VALUE: 19
PIF_VALUE: 19
PIF_VALUE: 18
PIF_VALUE: 19
PIF_VALUE: 1
PIF_VALUE: 15
PIF_VALUE: 19
PIF_VALUE: 14
PIF_VALUE: 18
PIF_VALUE: 15
PIF_VALUE: 14
PIF_VALUE: 19
PIF_VALUE: 14
PIF_VALUE: 14
PIF_VALUE: 19
PIF_VALUE: 18
PIF_VALUE: 19
PIF_VALUE: 15
PIF_VALUE: 18
PIF_VALUE: 19

## 2020-10-29 ASSESSMENT — PAIN SCALES - GENERAL
PAINLEVEL_OUTOF10: 0

## 2020-10-29 ASSESSMENT — PAIN - FUNCTIONAL ASSESSMENT: PAIN_FUNCTIONAL_ASSESSMENT: 0-10

## 2020-10-29 NOTE — PROGRESS NOTES
1800 Spoke to Dr. Guerrero Flair of [facility] and c/o 'nausea' after pt vomits small amount <10mL light green clear emesis with orders received and S.O. at bedside. Will monitor.

## 2020-10-29 NOTE — PROGRESS NOTES
Patient encouraged to take deep breaths, RA sats increased to 97% when she performed this. States nausea better. She does use CPAP @ home and stated she falls asleep easily. Patient eager to go home. States pain tolerable. Sister in [address] [facility].

## 2020-10-29 NOTE — PROGRESS NOTES
Ambulatory Surgery/Procedure Discharge Note    Vitals:    10/29/20 1800   BP: 98/61   Pulse: 85   Resp: 14   Temp: 97.2 °F (36.2 °C)   SpO2: 94%       In: 499 [P.O.:120; I.V.:379]  Out: 48 [Drains:50]    Restroom use offered before discharge. yes  Pain assessment:Pain Level: 0        Patient discharged to home/self care.  Patient discharged via wheel chair by transporter to waiting family/S.O.       10/29/2020 6:25 PM

## 2020-10-29 NOTE — H&P
[facility]    [id number]    [facility] Same Day Surgery Update H & P  Department of General Surgery   Surgical Service   Pre-operative History and Physical  Last H & P within the last 30 days. DIAGNOSIS:   Macromastia [N62]    Procedure(s):  BILATERAL BREAST REDUCTION     HISTORY OF PRESENT ILLNESS:   Patient with c/o upper back pain in the setting of macromastia presents today for the above procedure. Covid 19:  Patient denies fever, chills, cough or known exposure to Covid-19.        Past Medical History:        Diagnosis Date    Anxiety     Hypercholesteremia     Obstructive sleep apnea (adult) (pediatric)     APAP 10-18 (ave 12 cwp)    Restless leg      Past Surgical History:        Procedure Laterality Date    ACHILLES TENDON SURGERY Right     CHOLECYSTECTOMY      COLONOSCOPY N/A 2020    COLORECTAL CANCER SCREENING, HIGH RISK performed by Yasmany Thomas MD at [address] [place] [address]      DUB:  Dr. Laurie Frank still in   [place] [id number]     Past Social History:  Social History     Socioeconomic History    Marital status: Single     Spouse name: Tiny Norris ([phone] POA)     Number of children: 0    Years of education: None    Highest education level: None   Occupational History    Occupation: [employer]   Social Needs    Financial resource strain: None    Food insecurity     Worry: None     Inability: None    Transportation needs     Medical: None     Non-medical: None   Tobacco Use    Smoking status: Former Smoker     Packs/day: 0.25     Years: 22.00     Pack years: 5.50     Types: Cigarettes     Last attempt to quit: 2018     Years since quittin.3    Smokeless tobacco: Never Used    Tobacco comment: contemplative, in process of quitting , discussed with patient   Substance and Sexual Activity    Alcohol use: Yes     Comment: occasionally    Drug use: No    Sexual activity: Yes     Partners: Female   Lifestyle    Physical activity     Days per week: None     Minutes per session: None    Stress: None   Relationships    Social connections     Talks on phone: None     Gets together: None     Attends Oriental orthodox service: None     Active member of club or organization: None     Attends meetings of clubs or organizations: None     Relationship status: None    Intimate partner violence     Fear of current or ex partner: None     Emotionally abused: None     Physically abused: None     Forced sexual activity: None   Other Topics Concern    None   Social History Narrative    None         Medications Prior to Admission:      Prior to Admission medications    Medication Sig Start Date End Date Taking? Authorizing Provider   pramipexole (MIRAPEX) 0.25 MG tablet Take 1 tablet by mouth nightly 10/2/20  Yes Chad Bauer MD   carbidopa-levodopa (SINEMET)  MG per tablet TAKE 1 TABLET BY MOUTH ONE TIME A DAY 9/28/20  Yes Tennis Drafts, MD   simvastatin (ZOCOR) 40 MG tablet TAKE 1 TABLET BY MOUTH NIGHTLY 9/14/20  Yes Cristina Holstein, DO   venlafaxine (EFFEXOR XR) 75 MG extended release capsule TAKE 1 CAPSULE BY MOUTH ONE TIME A DAY 6/29/20  Yes Chad Bauer MD   cetirizine (ZYRTEC) 10 MG tablet Take 10 mg by mouth daily   Yes Historical Provider, MD   Multiple Vitamins-Minerals (MULTIVITAL PO) Take by mouth    Historical Provider, MD         Allergies:  Patient has no known allergies.     PHYSICAL EXAM:      /79   Pulse 91   Temp 98.5 °F (36.9 °C) (Oral)   Resp 16   Ht 5' 7\" (1.702 m)   Wt 190 lb (86.2 kg)   LMP  (LMP Unknown)   SpO2 95%   BMI 29.76 kg/m²      Airway:  Airway patent with no audible stridor    Heart:  regular rate and rhythm, No murmur noted    Lungs:  No increased work of breathing, good air exchange, clear to auscultation bilaterally, no crackles or wheezing    Abdomen:  soft, non-distended, non-tender, no rebound tenderness or guarding, normal active bowel sounds and no masses palpated    ASSESSMENT AND PLAN     Patient is a 54 y.o. female with above specified procedure planned. 1.  Patient seen and focused exam done today- no new changes since last physical exam on 10/2/20    2. Access to ancillary services are available per request of the provider.     TASH Nicholson CNP     10/29/2020

## 2020-10-29 NOTE — OP NOTE
[place] [address] SURGERY     OPERATIVE DICTATION    NAME: Maggi Robins   MRN: [id number]  DATE: 10/29/2020    AGE: 54 y.o. PREOPERATIVE DIAGNOSIS:  Macromastia.     POSTOPERATIVE DIAGNOSIS:  Macromastia.     OPERATION PERFORMED:  1. Bilateral breast reduction. 2. Intraoperative SPY fluorescent angiography     SURGEON:  Favian Wilkes MD     ASSISTANT:  Kwan DALTON).     ANESTHESIA:  General.     ESTIMATED BLOOD LOSS:  150 mL.     DRAINS:  2 (1 each breast).     SPECIMENS:  Breast tissue (right breast 621 gm, left breast 640 gm).     OPERATIVE INDICATIONS:  This is an 54y.o.-year-old female with a history of  symptomatic macromastia. She desired breast reduction to alleviate her  symptoms. The risks, benefits, alternatives, outcomes, and  personnel involved with the aforementioned procedure were discussed in  detail with the patient. Of note, the patient has a history of smoking which she denied during the consultation. After all questions were answered in a satisfactory manner, she agreed to proceed.     OPERATIVE PROCEDURE:  The patient was marked in the preoperative holding  area and then brought to the operating room, and placed in supine position  on the operating room table. After satisfactory induction of general  endotracheal anesthesia, the patient was then prepped and draped in the  usual sterile manner. Time-out was performed confirming the patient and  the procedure to be performed. The operation commenced via  de-epithelializing along the inferior aspect of the breast to create an  inferior pedicle. A Carrillo pattern was then scored using the previous  markings. The superior flap was then elevated, initially on the right   breast and then on the left breast.  Breast tissue was removed from the  lateral, superior and posterior aspect of the breast pedicle.   On the  medial aspect, additional tissue was removed; however, the additional  volume was kept to allow for cleavage. The same procedure was then  performed on the contralateral left side removing the same tissue. The patient was then  tailor-tacked, sat up to check for symmetry. Once symmetry was assessed to  be satisfactory, the patient was then sat back down. The breast opened up. The wounds were both copiously irrigated and hemostasis with  electrocautery, clips, and ties. Once it was satisfactory, the wound was  then again tailor-tacked and then closed in layers using 3-0 Monocryl. The nipples were then brought through the superior aspect of the vertical incision after being sat up to ensure appropriate positioning. During the initial insetting, it appeared that the left nipple was congested. Thus, SPY angiography was performed revealing satisfactory perfusion to the bilateral nipples. The nipples were then sutured in place using 3-0 Monocryl suture. A final SPY image was performed confirming appropriate perfusion. There was an area on the left breast lateral skin flap that had some decreased perfusion and Nitropaste was applied to this area as well as the nipples. Sterile dressings were then applied. The patient was then awakened and taken to PACU in satisfactory condition. There were no  immediate complications. The patient tolerated the procedure well.   At the  end of the case, all counts were correct.     Pattie Simeon MD

## 2020-10-29 NOTE — ANESTHESIA PRE PROCEDURE
Department of Anesthesiology  Preprocedure Note       Name:  Gilma Gorman   Age:  54 y.o.  :  1965                                          MRN:  [id number]         Date:  10/29/2020      Surgeon: Socorro Montemayor):  Tanisha Price MD    Procedure: Procedure(s):  BILATERAL BREAST REDUCTION    Medications prior to admission:   Prior to Admission medications    Medication Sig Start Date End Date Taking?  Authorizing Provider   pramipexole (MIRAPEX) 0.25 MG tablet Take 1 tablet by mouth nightly 10/2/20  Yes Chester Mantilla MD   carbidopa-levodopa (SINEMET)  MG per tablet TAKE 1 TABLET BY MOUTH ONE TIME A DAY 20  Yes Chester Mantilla MD   simvastatin (ZOCOR) 40 MG tablet TAKE 1 TABLET BY MOUTH NIGHTLY 20  Yes Abimael Carlos DO   venlafaxine (EFFEXOR XR) 75 MG extended release capsule TAKE 1 CAPSULE BY MOUTH ONE TIME A DAY 20  Yes Chester Mantilla MD   cetirizine (ZYRTEC) 10 MG tablet Take 10 mg by mouth daily   Yes Historical Provider, MD   Multiple Vitamins-Minerals (MULTIVITAL PO) Take by mouth    Historical Provider, MD       Current medications:    Current Facility-Administered Medications   Medication Dose Route Frequency Provider Last Rate Last Dose    ceFAZolin (ANCEF) 2 g in dextrose 3 % 50 mL IVPB (duplex)  2 g Intravenous Once Tanisha Price MD        lactated ringers infusion   Intravenous Continuous Kiara Salinas MD        lactated ringers infusion   Intravenous Continuous Flip Freire MD        sodium chloride flush 0.9 % injection 10 mL  10 mL Intravenous 2 times per day Flip Freire MD        sodium chloride flush 0.9 % injection 10 mL  10 mL Intravenous PRN Flip Freire MD        lidocaine PF 1 % injection 1 mL  1 mL Intradermal Once PRN Flip Freire MD           Allergies:  No Known Allergies    Problem List:    Patient Active Problem List   Diagnosis Code    Depression F32.9    Hypercholesteremia E78.00    Achilles tendon rupture S86.019A    Lumbago M54.5    Obstructive sleep apnea G47.33    Tobacco abuse Z72.0    Rosacea L71.9    Periorificial dermatitis L71.0    Multiple benign melanocytic nevi D22.9       Past Medical History:        Diagnosis Date    Anxiety     Hypercholesteremia     Obstructive sleep apnea (adult) (pediatric)     APAP 10-18 (ave 12 cwp)    Restless leg        Past Surgical History:        Procedure Laterality Date    ACHILLES TENDON SURGERY Right     CHOLECYSTECTOMY      COLONOSCOPY N/A 2020    COLORECTAL CANCER SCREENING, HIGH RISK performed by Dimitry Bridges MD at [address] [place] [address], [facility]  2002    DUB:  Dr. Trena Jolly still in   [place] [address]       Social History:    Social History     Tobacco Use    Smoking status: Former Smoker     Packs/day: 0.25     Years: 22.00     Pack years: 5.50     Types: Cigarettes     Last attempt to quit: 2018     Years since quittin.3    Smokeless tobacco: Never Used    Tobacco comment: contemplative, in process of quitting , discussed with patient   Substance Use Topics    Alcohol use: Yes     Comment: occasionally                                Counseling given: Not Answered  Comment: contemplative, in process of quitting , discussed with patient      Vital Signs (Current):   Vitals:    10/27/20 0935 10/29/20 0948   BP:  106/79   Pulse:  91   Resp:  16   Temp:  98.5 °F (36.9 °C)   TempSrc:  Oral   SpO2:  95%   Weight: 190 lb (86.2 kg) 190 lb (86.2 kg)   Height: 5' 7\" (1.702 m) 5' 7\" (1.702 m)                                              BP Readings from Last 3 Encounters:   10/29/20 106/79   10/02/20 103/73   20 112/77       NPO Status:                                                                                 BMI:   Wt Readings from Last 3 Encounters:   10/29/20 190 lb (86.2 kg)   10/02/20 185 lb (83.9 kg)   20 192 lb (87.1 kg)     Body mass index is 29.76 kg/m².     CBC:   Lab Results   Component Value Date    WBC 6.6 10/02/2020    RBC 4.82 10/02/2020    HGB 14.8 10/02/2020    HCT 44.1 10/02/2020    MCV 91.4 10/02/2020    RDW 13.3 10/02/2020     10/02/2020       CMP:   Lab Results   Component Value Date     10/02/2020    K 4.5 10/02/2020     10/02/2020    CO2 28 10/02/2020    BUN 17 10/02/2020    CREATININE 0.9 10/02/2020    GFRAA >60 10/02/2020    GFRAA >60 06/12/2013    AGRATIO 1.9 10/02/2020    LABGLOM >60 10/02/2020    GLUCOSE 103 10/02/2020    PROT 6.7 10/02/2020    PROT 6.9 11/12/2012    CALCIUM 9.3 10/02/2020    BILITOT <0.2 10/02/2020    ALKPHOS 88 10/02/2020    AST 22 10/02/2020    ALT 17 10/02/2020       POC Tests: No results for input(s): POCGLU, POCNA, POCK, POCCL, POCBUN, POCHEMO, POCHCT in the last 72 hours.     Coags:   Lab Results   Component Value Date    PROTIME 11.4 10/02/2020    INR 0.98 10/02/2020    APTT 36.8 10/02/2020       HCG (If Applicable): No results found for: PREGTESTUR, PREGSERUM, HCG, HCGQUANT     ABGs: No results found for: PHART, PO2ART, AYQ4XMC, BHT0OTJ, BEART, T5KFNWLZ     Type & Screen (If Applicable):  No results found for: LABABO, LABRH    Drug/Infectious Status (If Applicable):  Lab Results   Component Value Date    HEPCAB Non-Reactive (Negative) 03/20/2013       COVID-19 Screening (If Applicable):   Lab Results   Component Value Date    COVID19 NOT DETECTED 10/23/2020         Anesthesia Evaluation  Patient summary reviewed and Nursing notes reviewed no history of anesthetic complications:   Airway: Mallampati: III  TM distance: >3 FB   Neck ROM: full  Mouth opening: > = 3 FB Dental: normal exam         Pulmonary:   (+) sleep apnea:                             Cardiovascular:Negative CV ROS                      Neuro/Psych:   Negative Neuro/Psych ROS  (+) depression/anxiety             GI/Hepatic/Renal: Neg GI/Hepatic/Renal ROS            Endo/Other: Negative Endo/Other ROS                    Abdominal:           Vascular: negative vascular ROS.                                       Anesthesia Plan      general     ASA 2       Induction: intravenous. Anesthetic plan and risks discussed with patient.                       Jeison Pulido MD   10/29/2020

## 2020-10-29 NOTE — PROGRESS NOTES
Patient arrived from OR to PACU # 18 s/p BILATERAL BREAST REDUCTION AND SPY (Bilateral Breast) per . Attached to PACU monitoring device, report received from CRNA who reported no problems intraoperatively. Patient sedated on arrival with simple mask snoring.

## 2020-10-29 NOTE — PROGRESS NOTES
PACU Transfer to [facility]    Vitals:    10/29/20 1721   BP: 105/71   Pulse: 97   Resp: 12   Temp: 96.9 °F (36.1 °C)   SpO2: 92%         Intake/Output Summary (Last 24 hours) at 10/29/2020 1738  Last data filed at 10/29/2020 1721  Gross per 24 hour   Intake 3179 ml   Output 350 ml   Net 2829 ml       Pain assessment:  none  Pain Level: 0(reports tolerable)    Patient transferred to care of [facility] RN.    10/29/2020 5:38 PM

## 2020-10-29 NOTE — ANESTHESIA POSTPROCEDURE EVALUATION
Department of Anesthesiology  Postprocedure Note    Patient: Gisel Tomlinson  MRN: [id number]  YOB: 1965  Date of evaluation: 10/29/2020  Time:  4:22 PM     Procedure Summary     Date:  10/29/20 Room / Location:  [facility] [address]  / [facility]    Anesthesia Start:  1106 Anesthesia Stop:  5338    Procedure:  BILATERAL BREAST REDUCTION AND SPY (Bilateral Breast) Diagnosis:       Macromastia      (Symptomatic Macromastia Larinda Ebbing)    Surgeon:  Muna Salgado MD Responsible Provider:  Sona Sun MD    Anesthesia Type:  general ASA Status:  2          Anesthesia Type: general    Hortensia Phase I: Hortensia Score: 5    Hortensia Phase II:      Last vitals: Reviewed and per EMR flowsheets.        Anesthesia Post Evaluation    Patient location during evaluation: PACU  Patient participation: complete - patient participated  Level of consciousness: awake and alert  Pain score: 0  Airway patency: patent  Nausea & Vomiting: no nausea and no vomiting  Complications: no  Cardiovascular status: hemodynamically stable  Respiratory status: acceptable  Hydration status: euvolemic

## 2020-10-29 NOTE — PROGRESS NOTES
HOB elevated, placed on RA. Became nauseated, pale and sats dropped to 88-89%. Medicated with PRN Zofran, placed supine IVF's WO. Patient then began snoring, continue to monitor.

## 2020-11-02 ENCOUNTER — TELEPHONE (OUTPATIENT)
Dept: SURGERY | Age: 55
End: 2020-11-02

## 2020-11-04 ENCOUNTER — OFFICE VISIT (OUTPATIENT)
Dept: SURGERY | Age: 55
End: 2020-11-04

## 2020-11-04 VITALS
RESPIRATION RATE: 16 BRPM | DIASTOLIC BLOOD PRESSURE: 89 MMHG | HEIGHT: 67 IN | OXYGEN SATURATION: 98 % | HEART RATE: 99 BPM | BODY MASS INDEX: 28.66 KG/M2 | WEIGHT: 182.6 LBS | TEMPERATURE: 97.2 F | SYSTOLIC BLOOD PRESSURE: 128 MMHG

## 2020-11-04 PROCEDURE — 99024 POSTOP FOLLOW-UP VISIT: CPT | Performed by: SURGERY

## 2020-11-04 NOTE — PROGRESS NOTES
[facility]    PROCEDURE: BBR  DATE: 10/29/20    Libby Agarwal has been recovering well since her procedure. Pain has been well controlled without pain medications. EXAM    /89   Pulse 99   Temp 97.2 °F (36.2 °C)   Resp 16   Ht 5' 7\" (1.702 m)   Wt 182 lb 9.6 oz (82.8 kg)   LMP  (LMP Unknown)   SpO2 98%   BMI 28.60 kg/m²     GEN: NAD   BREAST: Incisions healing well  No hematoma/seroma  Nipples viable  Drains serosang    PATHOLOGY:    A. Left breast tissue, reduction mammoplasty:      - Breast tissue proper with fibrocystic changes, primarily variably        prominent stromal fibrosis, and skin without diagnostic alterations        (negative for dysplasia, malignancy, and significant atypia).        B. Right breast tissue, reduction mammoplasty:      - Invasive and in situ carcinoma, favor uniformly ductal; see synoptic        report. IMP: 54 y. o.female s/p BBR with pathology revealing breast CA  PLAN: Drains removed. Discussed her pathology with her. Will have presented at conference next week, but consultations placed for breast/medial oncology.     Colleen Siddiqui MD  [address] [place] [address] [address] Reconstructive Surgery  [phone]  11/04/20

## 2020-11-04 NOTE — Clinical Note
Theodore ramon! Joaquina Mccabe is a pio 55F who did well from her breast reduction last week. Unfortunately, her pathology revealed an invasive ductal CA on the right breast in one of the specimen. I would like to present her at conference next week to discuss the next steps for her. Who should I reach out to get her on the schedule? Please contact me anytime if there are any questions. Thanks!   Rich Owusu

## 2020-11-05 ENCOUNTER — PATIENT MESSAGE (OUTPATIENT)
Dept: PRIMARY CARE CLINIC | Age: 55
End: 2020-11-05

## 2020-11-19 ENCOUNTER — TELEPHONE (OUTPATIENT)
Dept: SURGERY | Age: 55
End: 2020-11-19

## 2020-11-19 RX ORDER — CEPHALEXIN 500 MG/1
500 CAPSULE ORAL 4 TIMES DAILY
Qty: 40 CAPSULE | Refills: 0 | Status: SHIPPED | OUTPATIENT
Start: 2020-11-19 | End: 2020-11-29

## 2020-11-19 NOTE — TELEPHONE ENCOUNTER
Pt had surgery on 10.29  She said there is pus on the suture site on her left breast    Please call pt: [phone]

## 2020-11-19 NOTE — TELEPHONE ENCOUNTER
Patient had BBR on 10/29/2020. Called patient to discuss concern with suture site on left breast:     Patient states that patient has drainage on left side from suture site that is around nipple. Drainage is yellow in color. Breast is reddened around incision and is warm to the touch. Patient denies fevers or chills. Patient is not on any antibiotics at this time. Per MD, Patient to have Silvadene daily dressing changes, keflex 500 QID for 10 days and follow up next week. Patient coming by office to  dressing supplies. ATB sent to pharmacy.

## 2020-11-23 ENCOUNTER — OFFICE VISIT (OUTPATIENT)
Dept: SURGERY | Age: 55
End: 2020-11-23

## 2020-11-23 VITALS — TEMPERATURE: 97 F | WEIGHT: 186.8 LBS | BODY MASS INDEX: 29.32 KG/M2 | HEIGHT: 67 IN

## 2020-11-23 PROCEDURE — 99024 POSTOP FOLLOW-UP VISIT: CPT | Performed by: SURGERY

## 2020-11-23 NOTE — PROGRESS NOTES
[facility]    PROCEDURE: BBR  DATE: 10/29/20    Lucas Snowden has been recovering well since her procedure. Pain has been well controlled without pain medications. She notes that on Saturday, she was stretching her left arm and felt pain in the lower aspect of her left breast. She then noted that there was some separation of the incision and she was advised to present to the office for evaluation. EXAM    Temp 97 °F (36.1 °C) (Temporal)   Ht 5' 7\" (1.702 m)   Wt 186 lb 12.8 oz (84.7 kg)   LMP  (LMP Unknown)   BMI 29.26 kg/m²     GEN: NAD   BREAST: Incisions on right healing well  Left lateral aspect with superficial dehiscence of the incision without evidence of infection  No hematoma/seroma  Nipples viable      PATHOLOGY:    A. Left breast tissue, reduction mammoplasty:      - Breast tissue proper with fibrocystic changes, primarily variably        prominent stromal fibrosis, and skin without diagnostic alterations        (negative for dysplasia, malignancy, and significant atypia).        B. Right breast tissue, reduction mammoplasty:      - Invasive and in situ carcinoma, favor uniformly ductal; see synoptic        report. IMP: 54 y. o.female s/p BBR with pathology revealing breast CA  PLAN: Discussed local wound care and the importance of limiting activities. Will follow-up in 1 week.     Marquez Jay MD  [address] [place] [address] [address] Reconstructive Surgery  [phone]  11/23/20

## 2020-12-10 ENCOUNTER — TELEPHONE (OUTPATIENT)
Dept: SURGERY | Age: 55
End: 2020-12-10

## 2020-12-10 NOTE — TELEPHONE ENCOUNTER
Left VM for patient to call us back in regards to her referral. Must see Adia Mcneal first before her mammo. * Patient has referral in workqueue.

## 2020-12-11 ENCOUNTER — TELEPHONE (OUTPATIENT)
Dept: SURGERY | Age: 55
End: 2020-12-11

## 2020-12-14 ENCOUNTER — OFFICE VISIT (OUTPATIENT)
Dept: SURGERY | Age: 55
End: 2020-12-14

## 2020-12-14 VITALS
OXYGEN SATURATION: 98 % | DIASTOLIC BLOOD PRESSURE: 86 MMHG | BODY MASS INDEX: 29.91 KG/M2 | HEART RATE: 90 BPM | WEIGHT: 191 LBS | TEMPERATURE: 97.2 F | SYSTOLIC BLOOD PRESSURE: 116 MMHG | RESPIRATION RATE: 16 BRPM

## 2020-12-14 PROCEDURE — 99024 POSTOP FOLLOW-UP VISIT: CPT | Performed by: SURGERY

## 2020-12-15 NOTE — PROGRESS NOTES
BREAST SURGICAL ONCOLOGY NEW PATIENT EVALUATION    20     Chief Complaint: Right breast cancer    History of Present Illness  Richard Mendoza is a 54 y.o. female  referred by Radha Goldberg MD for right breast cancer. Ms. Ashley Olsen underwent bilateral breast reduction with Dr. Krissy Peterson on 10/29/2020 due to large breasts and chronic back pain. 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 Based on the surgical specimen, the greatest diameter of tumor identified was 18 mm. Prior to this, her last routine screening mammogram was performed 10/25/2019 and was read as stable, BI-RADS 1, with heterogeneously dense breast tissue. She denies breast symptoms including palpating any breast masses, skin/nipple changes, nipple discharge or breast pain. No breast biopsies prior to this. She did see Dr. Krissy Peterson on  and was prescribed silvadene for local wound care of the left breast. She reports that she has 2 small open areas on the left, and also one small open are on the right. She is asymptomatic today and denies any systemic complaints including weight loss, HA, or vision changes. Family history is significant for colon cancer in her brother (dx 72) and breast cancer in a paternal aunt. Unsure when her aunt was diagnosed, but she  from breast CA in her 45s. No ovarian cancer in the family. I reviewed her medical records. She has a history of RLS, depression and hypercholesterolemia on simvastatin. Also a history of sleep apnea and does use CPAP at night. No RAMOS. She follows with a sleep doctor. No issues with anesthesia during her reduction, aside from severe PONV.      PCP - MD Savannah Dean RN, CNP    REVIEW OF SYSTEMS  Constitutional:Negative for fever, chills, and unexpected weight change. HENT: Negative for hearing loss, trouble swallowing and voice change. Eyes: Negative for visual disturbance. Respiratory: Negative for cough, shortness of breath and wheezing. Cardiovascular: Negative for chest pain, palpitations and leg swelling. Gastrointestinal: Negative for abdominal pain, diarrhea, nausea and vomiting. Endocrine: Negative for cold intolerance and heat intolerance. Musculoskeletal: Negative for arthralgias, gait problem and myalgias. Skin: Negative for rash and wound. Neurological: Negative for syncope, weakness, numbness and headaches. Hematological: Negative for bruises/bleeds easily. Psychiatric/Behavioral: Negative for dysphoric mood. The patient is not nervous/anxious. I personally reviewed and agree with the above ROS as documented by my nurse. Obstetric/Gynecologic History  Number of pregnancies: 0  Births: 0  Age at First Birth:  n/a  Age at Menstruation:  13  Still Menstruating? No, s/p hysterectomy at age 36 for heavy periods, ovaries still in place    Frequency of mammograms: Annually  Bra/Cup size: 45 D prior to reduction, unsure what size she is now. History of breastfeeding?  n/a  History of OCP Use? Yes for 10 years and is not currently taking  History of hormone use? Never.     Past Medical History      Diagnosis Date    Anxiety     Breast cancer, right breast ([facility] [address]     Hypercholesteremia     Obstructive sleep apnea (adult) (pediatric)     APAP 10-18 (ave 12 cwp)    Restless leg         Past Surgical History      Procedure Laterality Date    ACHILLES TENDON SURGERY Right     BREAST REDUCTION SURGERY  10/29/2020    BREAST REDUCTION SURGERY Bilateral 10/29/2020    BILATERAL BREAST REDUCTION AND SPY performed by Makenna Solorzano MD at [address] [facility] COLONOSCOPY N/A 6/11/2020    COLORECTAL CANCER SCREENING, HIGH RISK performed by Janeth Beauchamp MD at [facility] circumstances MRI of the breasts is recommended. BI-RADS 0. Right axillary US revealed normal fatty replaced lymph nodes. PATHOLOGY:  I personally reviewed the pathology report from her bilateral reduction mammaplasty. In the left breast, there was benign tissue. In the right breast tissue, this demonstrated invasive ductal carcinoma, grade 1, ER positive, NY positive, HER2 negative    ----------------------------------------------    IMPRESSION:   54 y.o. female with invasive ductal cancer, right breast, at least 1.8 cm. Clinical AJCC (8th Edition) Stage: Cancer Staging  Breast cancer, right breast ([facility] [address]  Staging form: Breast, AJCC 8th Edition  - Pathologic stage from 12/15/2020: Stage Unknown (pT1c, pNX, cM0, G1, ER+, NY+, HER2-) - Signed by Lazarus Alcaraz DO on 12/15/2020       PLAN:    I had a detailed discussion with Ms. Tristan Mccracken and regarding her diagnosis of right breast cancer. We discussed the treatment of breast cancer, which includes surgery as well as adjuvant therapies such as radiation and systemic therapy. I discussed that her care will be coordinated between a multidisciplinary treatment care team.     In her case, her breast cancer was identified incidentally s/p reduction mammaplasty. We talked about how when breast cancer is diagnosed more conventionally, surgical treatment options typically include breast conservation (lumpectomy) or mastectomy. She has essentially already had a lumpectomy, however, given the character of the excision, adequate margin evaluation was not possible by the pathologist and . In order to obtain additional information about margin status, I recommend that we begin by obtaining breast imaging. I recommend that we obtain a bilateral diagnostic mammogram (to evaluate for any potential residual calcifications, given presence of DCIS) and also a breast MRI.  She understands that since she is only about 6 weeks out from surgery, we will likely see post-operative changes on her imaging which could potentially make it more difficult to interpret. If her breast imaging does not reveal any clear evidence of gross residual disease, then she may not require any additional breast surgery. We did also discussed the option of mastectomy and how that procedure is performed. We discussed the fact that although many feel that mastectomy eliminates the risk of recurrence or developing a new breast cancer, this is not the case; recurrences or breast cancer can occur after a mastectomy. One reason is because a small amount of tissue must remain under the skin flaps for the skin's blood supply, and cancers can develop within that tissue. We also discussed the need for possible hospitalization post-procedure (although we would attempt to send her home same day), and that she would have drains in place. We also reviewed that her chest wall would be numb s/p mastectomy, and that she would no longer require routine mammograms on the side of a mastectomy. We briefly discussed the types of mastectomy including total/simple mastectomy without reconstruction, or skin-sparing or nipple sparing with reconstruction. If she opted for mastectomy, at this time, she does not feel that she would want any reconstruction. However, she is willing to discuss options with Dr. Edmundo Love if she pursues this. If she was interested in NS mastectomy, I would need to discuss this with Dr. Edmundo Love to see if she is a candidate for this given her healing justin-areolar incisions. At this time, she is unsure if she is interested in completion mastectomy. If she decided to pursue completion mastectomy, she asked about also having a contralateral prophylactic mastectomy (CPM). We discussed the reasons why some women decide to have this done. I explained that data demonstrates no improvement in overall survival with CPM in the absence of BRCA mutation or a compelling family history.   CPM does not eliminate the chance of developing cancer on that side, but is does decrease it. Bilateral mastectomies would eliminate the need for annual imaging with mammography and possibly MRI. If she has a gene mutation, an alternative to prophylactic mastectomy would be high risk screening with alternating mammogram and MRI. I do recommend that we return to the OR for sentinel lymph node biopsy. We reviewed how this is performed using blue dye and radiotracer and the role this plays in staging and treatment. We also discussed the potential complications of this, including but not limited to blue discoloration of the skin and urine, a 3-5% risk of lymphedema, and pain or numbness that can occur in the triceps area over the back of the arm. Depending on results of the sentinel lymph node biopsy, she could require additional axillary surgery at a later date. Given that she had surgery on her breast ~6 weeks ago she is at a higher risk for non-identification of sentinel node. However, dual tracer localization will be used. Additionally, based on our tumor board discussion today, we will obtain a right axillary US which could also aid us in identifying any potentially abnormal node which we could then clip. We reviewed the guidelines and if a sentinel node is not able to be identified, the standard of care would be to perform a full axillary lymph node dissection which is associated with a significantly higher lymphedema risk. If the sentinel lymph node is not identified, an alternative would be to take a small sampling of nodes rather than perform a full AxLND to try to minimize her risk of lymphedema, which she is concerned about. We will discuss this more in the future. After a thorough discussion of the above treatment options, at this time we will proceed with bilateral diagnostic mammogram, right axillary US and bilateral breast MRI.  The diagnostic mammogram and right axillary US were able to be performed today and did not show any suspicious findings. Following her MRI, we will make a final decision re: need for additional breast surgery. She understands that regardless of this, she will require right sentinel lymph node biopsy. We also discussed that prior to surgery, the patient will need to see her PCP within 30 days of surgery to obtain pre-operative clearance and she will require a COVID test.     We also discussed adjuvant therapy. I explained to her that we do recommend adjuvant radiation as part of breast conservation. She could potentially require radiation in certain situations after mastectomy. I will make arrangements for her to meet with a radiation oncologist post-operatively if needed. Systemic therapy including chemotherapy and endocrine therapy were reviewed. She will certainly be a candidate for endocrine therapy. She could also potentially require chemotherapy. I will arrange for a medical oncology consultation post-operatively to discuss this further. Finally, we also discussed her personal and family history and the role of genetic counseling and testing. Based on her risk, we will refer her for counseling +/- testing. She feels that these results may influence her surgical decision. She does seem understandably overwhelmed by our discussion today. She asked if she absolutely had to proceed with all of the above treatment. We reviewed that these are standard recommendations for treatment of breast cancer which she understands. In the best case scenario, at minimum, she may only require SLN biopsy, radiation, and then endocrine therapy. I answered all of her questions thoroughly, and she does seem pleased with this plan of approach. I encouraged her to contact me in the interim if any new questions or concerns arise. She will call central scheduling to set up her breast MRI, and will try to have this done this month. Her case was presented at [facility] tumor board this morning, 12/16/20.  Pathology

## 2020-12-16 ENCOUNTER — OFFICE VISIT (OUTPATIENT)
Dept: SURGERY | Age: 55
End: 2020-12-16
Payer: COMMERCIAL

## 2020-12-16 ENCOUNTER — HOSPITAL ENCOUNTER (OUTPATIENT)
Dept: WOMENS IMAGING | Age: 55
Discharge: HOME | End: 2020-12-16
Payer: COMMERCIAL

## 2020-12-16 ENCOUNTER — HOSPITAL ENCOUNTER (OUTPATIENT)
Dept: ULTRASOUND IMAGING | Age: 55
Discharge: HOME | End: 2020-12-16
Payer: COMMERCIAL

## 2020-12-16 VITALS
HEART RATE: 104 BPM | WEIGHT: 190.4 LBS | SYSTOLIC BLOOD PRESSURE: 106 MMHG | TEMPERATURE: 97.8 F | BODY MASS INDEX: 29.88 KG/M2 | RESPIRATION RATE: 16 BRPM | HEIGHT: 67 IN | DIASTOLIC BLOOD PRESSURE: 82 MMHG

## 2020-12-16 PROCEDURE — 76882 US LMTD JT/FCL EVL NVASC XTR: CPT

## 2020-12-16 PROCEDURE — G0279 TOMOSYNTHESIS, MAMMO: HCPCS

## 2020-12-16 PROCEDURE — 99245 OFF/OP CONSLTJ NEW/EST HI 55: CPT | Performed by: SURGERY

## 2020-12-16 NOTE — LETTER
[facility] Breast Surgery  [place] [address]  Phone: [phone]    Tonie Stewart DO        December 16, 2020     Vianey Mathis MD  [address] [place] [address]    Patient: Travis Hurtado  MR Number: [id number]  YOB: 1965  Date of Visit: 12/16/2020    Dear Dr. Vianey Mathis:    I saw Wilfred Short today for the evaluation of right breast cancer. Below are the relevant portions of my assessment and plan of care. If you have questions, please do not hesitate to call me. I look forward to following Ismael Vora along with you.     Sincerely,        Tonie Stewart, DO

## 2020-12-16 NOTE — PROGRESS NOTES
Constitutional:Negative for fever, chills, and unexpected weight change. HENT: Negative for hearing loss, trouble swallowing and voice change. Eyes: Negative for visual disturbance. Respiratory: Negative for cough, shortness of breath and wheezing. Cardiovascular: Negative for chest pain, palpitations and leg swelling. Gastrointestinal: Negative for abdominal pain, diarrhea, nausea and vomiting. Endocrine: Negative for cold intolerance and heat intolerance. Musculoskeletal: Negative for arthralgias, gait problem and myalgias. Skin: Negative for rash and wound. Neurological: Negative for syncope, weakness, numbness and headaches. Hematological: Negative for bruises/bleeds easily. Psychiatric/Behavioral: Negative for dysphoric mood. The patient is not nervous/anxious.

## 2020-12-16 NOTE — Clinical Note
I saw [facility] today - thank you for the referral! Very sweet lady who is understandably overwhelmed by things . .. Will proceed with bilateral breast MRI and then make a decision between proceeding to radiation, or going back for completion mastectomy. She seemed to initially be interested in mastectomy w/o recon but then at the end of discussion implied that maybe she wanted to be less aggressive, so we will see. Regardless will need sentinel node biopsy. Thank you!   Sharla Lloyd

## 2020-12-16 NOTE — PATIENT INSTRUCTIONS
I have referred you for genetic testing. We will also need to get you scheduled for a breast MRI. Once we have the breast MRI we will be able to make a final decision about if any more surgery on your breast is needed. Before surgery, you will need to be cleared by your family doctor and have a COVID test.      Patient Education        Lumpectomy: Before Your Surgery  What is a lumpectomy? Breast-conserving surgery (lumpectomy) removes cancer from the breast. Your doctor will make a small cut (incision) and take out the cancer. The whole breast won't be removed. The doctor will try to also take a small amount of normal tissue around the cancer. This is known as \"getting clear margins. \" Some people will need another surgery to be sure the margins are clear. The doctor may also check the nearby lymph nodes during the surgery. After surgery, you will probably go home the same day. Most people can go back to work or their normal routine in 1 to 3 weeks. This depends on how you feel. It also depends on the type of work you do and whether you need more treatment. This may include radiation or chemotherapy. Most people who have this surgery for cancer also get radiation treatment. The surgery will leave scars. Sometimes it leaves a dent in the breast too. Most women will look normal in a bra. But your breasts may not match in size or shape after surgery. This depends on the size of your breasts. It also depends on how much tissue was removed. When you find out that you have cancer, you may feel many emotions and may need some help coping. Seek out family, friends, and counselors for support. You also can do things at home to make yourself feel better while you go through treatment. Call the [employer] (7-364.491.8921) or visit its website at [employer] [employer]. org for more information. Follow-up care is a key part of your treatment and safety.  Be sure to make and go to all appointments, and call your doctor if you are having problems. It's also a good idea to know your test results and keep a list of the medicines you take. How do you prepare for surgery? Surgery can be stressful. This information will help you understand what you can expect. And it will help you safely prepare for surgery. Preparing for surgery    · Be sure you have someone to take you home. Anesthesia and pain medicine will make it unsafe for you to drive or get home on your own.     · Understand exactly what surgery is planned, along with the risks, benefits, and other options.     · If you take aspirin or some other blood thinner, ask your doctor if you should stop taking it before your surgery. Make sure that you understand exactly what your doctor wants you to do. These medicines increase the risk of bleeding.     · Tell your doctor ALL the medicines, vitamins, supplements, and herbal remedies you take. Some may increase the risk of problems during your surgery. Your doctor will tell you if you should stop taking any of them before the surgery and how soon to do it.     · Make sure your doctor and the hospital have a copy of your advance directive. If you don't have one, you may want to prepare one. It lets others know your health care wishes. It's a good thing to have before any type of surgery or procedure. What happens on the day of surgery? · Follow the instructions exactly about when to stop eating and drinking. If you don't, your surgery may be canceled. If your doctor told you to take your medicines on the day of surgery, take them with only a sip of water.     · Take a bath or shower before you come in for your surgery. Do not apply lotions, perfumes, deodorants, or nail polish.     · Do not shave the surgical site yourself.     · Take off all jewelry and piercings. And take out contact lenses, if you wear them.     · Bring a comfortable, supportive bra with you.  You will need to wear this all the time, even during the night, for the first week after surgery. At the hospital or surgery center   · Bring a picture ID.     · The area for surgery is often marked to make sure there are no errors. If your doctor can't feel the lump, a needle can be put in the suspicious area. This may be done during a mammogram just before surgery. The needle will guide your doctor.     · You will be kept comfortable and safe by your anesthesia provider. The anesthesia may make you sleep. Or it may just numb the area being worked on.     · The surgery will take about 1 hour or longer, depending on the size of the lump. When should you call your doctor? · You have questions or concerns.     · You don't understand how to prepare for your surgery.     · You become ill before the surgery (such as fever, flu, or a cold).     · You need to reschedule or have changed your mind about having the surgery. Where can you learn more? Go to https://[employer]. org and sign in to your [employer] account. Enter [phone] in the [facility] box to learn more about \"Lumpectomy: Before Your Surgery. \"     If you do not have an account, please click on the \"Sign Up Now\" link. Current as of: April 29, 2020               Content Version: 12.6  © 2814-7229 [employer]. Care instructions adapted under license by [facility] ([facility]). If you have questions about a medical condition or this instruction, always ask your healthcare professional. [place] [address] any warranty or liability for your use of this information.

## 2020-12-21 ENCOUNTER — HOSPITAL ENCOUNTER (OUTPATIENT)
Dept: MRI IMAGING | Age: 55
Discharge: HOME | End: 2020-12-21
Payer: COMMERCIAL

## 2020-12-21 PROCEDURE — 6360000004 HC RX CONTRAST MEDICATION: Performed by: SURGERY

## 2020-12-21 PROCEDURE — 77049 MRI BREAST C-+ W/CAD BI: CPT

## 2020-12-21 PROCEDURE — A9579 GAD-BASE MR CONTRAST NOS,1ML: HCPCS | Performed by: SURGERY

## 2020-12-21 RX ADMIN — GADOTERIDOL 18 ML: 279.3 INJECTION, SOLUTION INTRAVENOUS at 14:49

## 2020-12-22 ENCOUNTER — TELEPHONE (OUTPATIENT)
Dept: SURGERY | Age: 55
End: 2020-12-22

## 2020-12-22 NOTE — TELEPHONE ENCOUNTER
TELEPHONE NOTE    I called Ms. Catalino Dumas to review the results of her MRI. There was no evidence of any enhancing lesion to suggest neoplasm in the right (or left) breast. Small post-op seroma noted in left breast. Bilateral fatty replaced axillary nodes. Based on these results along with her most recent mammogram, there is no evidence of gross residual cancer. Her options for local treatment of the breast would be to proceed to radiation versus completion mastectomy. She has thought about her options since we last met and has decided she would like the least aggressive option and smallest amount of surgery possible. She is not interested in mastectomy at this time, and would only consider this if her genetic testing is positive (seeing 12/30). We did talk about how SLN bx is still recommended. We talked about the reasons for this, and how SLN bx results can influence other treatment decisions. We reviewed risks of this including lymphedema. She was initially hesitant to have this done and asked about other options (because she does not want to have more surgery), such as follow up with imaging. We discussed this possible alternative, and she understands that is not the standard of care. Ultimately, she decided that she would like to proceed with SLN bx. She understands she will need to meet with rad onc and med onc after surgery. I will reach out to her next week with potential dates for surgery. We will plan for end of January, once her genetic testing results are back. She understands she needs to see her PCP and have COVID testing prior to surgery. She has no other questions at this time. Yamil Alfonso.  Rodrigo Covarrubias, DO  Breast Surgical Oncology    [facility] Breast Surgery  Phone: [phone] (option 3)

## 2020-12-28 ENCOUNTER — TELEPHONE (OUTPATIENT)
Dept: SURGERY | Age: 55
End: 2020-12-28

## 2020-12-28 ENCOUNTER — PREP FOR PROCEDURE (OUTPATIENT)
Dept: SURGERY | Age: 55
End: 2020-12-28

## 2020-12-28 RX ORDER — SODIUM CHLORIDE 0.9 % (FLUSH) 0.9 %
10 SYRINGE (ML) INJECTION PRN
Status: CANCELLED | OUTPATIENT
Start: 2020-12-28

## 2020-12-28 RX ORDER — SODIUM CHLORIDE 0.9 % (FLUSH) 0.9 %
10 SYRINGE (ML) INJECTION EVERY 12 HOURS SCHEDULED
Status: CANCELLED | OUTPATIENT
Start: 2020-12-28

## 2020-12-28 NOTE — TELEPHONE ENCOUNTER
Spoke with this patient in regards to upcoming surgery at [facility]. Patient given date/ time/ arrival time of surgery. Assisted patient in scheduling pre-op COVID test and post-op appointment.

## 2021-01-07 NOTE — PROGRESS NOTES
The [facility] / [facility] ([facility]) [address] [place], [address]    Acknowledgment of Informed Consent for Surgical or Medical Procedure and Sedation  I agree to allow doctor(s) Wilbert Neil and his/her associates or assistants, including residents and/or other qualified medical practitioner to perform the following medical treatment or procedure and to administer or direct the administration of sedation as necessary:  Procedure(s): RIGHT SENTINEL LYMPH NODE BIOPSY  My doctor has explained the following regarding the proposed procedure:   the explanation of the procedure   the benefits of the procedure   the potential problems that might occur during recuperation   the risks and side effects of the procedure which could include but are not limited to severe blood loss, infection, stroke or death   the benefits, risks and side effect of alternative procedures including the consequences of declining this procedure or any alternative procedures   the likelihood of achieving satisfactory results. I acknowledge no guarantee or assurance has been made to me regarding the results. I understand that during the course of this treatment/procedure, unforeseen conditions can occur which require an additional or different procedure. I agree to allow my physician or assistants to perform such extension of the original procedure as they may find necessary. I understand that sedation will often result in temporary impairment of memory and fine motor skills and that sedation can occasionally progress to a state of deep sedation or general anesthesia. I understand the risks of anesthesia for surgery include, but are not limited to, sore throat, hoarseness, injury to face, mouth, or teeth; nausea; headache; injury to blood vessels or nerves; death, brain damage, or paralysis.     I understand that if I have a Limitation of Treatment order in effect during my hospitalization, the order may or may not be in effect during this procedure. I give my doctor permission to give me blood or blood products. I understand that there are risks with receiving blood such as hepatitis, AIDS, fever, or allergic reaction. I acknowledge that the risks, benefits, and alternatives of this treatment have been explained to me and that no express or implied warranty has been given by the hospital, any blood bank, or any person or entity as to the blood or blood components transfused. At the discretion of my doctor, I agree to allow observers, equipment/product representatives and allow photographing, and/or televising of the procedure, provided my name or identity is maintained confidentially. I agree the hospital may dispose of or use for scientific or educational purposes any tissue, fluid, or body parts which may be removed.     ________________________________Date________Time______ am/pm  ([place] [facility])  Patient or Signature of Closest Relative or Legal Guardian    ________________________________Date________Time______am/pm      Page 1 of  1  Witness

## 2021-01-18 NOTE — PROGRESS NOTES
[facility] [facility] patients having surgery or anesthesia are required to be Covid tested. You will need to quarantine from the time you are tested until your surgery. PRIOR TO PROCEDURE DATE:        1. PLEASE FOLLOW ANY  GUIDELINES/ INSTRUCTIONS PRIOR TO YOUR PROCEDURE AS ADVISED BY YOUR SURGEON. 2. Arrange for someone to drive you home and be with you for the first 24 hours after discharge for your safety after your procedure for which you received sedation. Ensure it is someone we can share information with regarding your discharge. 3. You must contact your surgeon for instructions IF:   You are taking any blood thinners, aspirin, anti-inflammatory or vitamin E.   There is a change in your physical condition such as a cold, fever, rash, cuts, sores or any other infection, especially near your surgical site. 4. Do not drink alcohol the day before or day of your procedure. 5. A Pre-op History and Physical for surgery MUST be completed by your Physician or Urgent Care within 30 days of your procedure date. Please bring a copy with you on the day of your procedure and along with any other testing performed. THE DAY OF YOUR PROCEDURE:  1. Follow instructions for ARRIVAL TIME as DIRECTED BY YOUR SURGEON. I    1. Enter the MAIN entrance from [employer] and follow the signs to the free [id number] or [employer] parking (offered free of charge 6am-5pm). 2. Enter the Main Entrance of the hospital (do not enter from the lower level of the parking garage). Upon entrance, check in with the  at the main desk on your left. If no one is available at the desk, proceed into the [facility] Waiting Room and go through the door directly into the [facility]. There is a Check-in desk ACROSS from Room 5 (marked with a sign hanging from the ceiling).  The phone number for the surgery center is co-pay, you may want to bring a method of payment, i.e. Check or credit card, if you wish to pay your co-pay the day of surgery. 12. If you are to stay overnight, you may bring a bag with personal items. Please have any large items you may need brought in by your family after your arrival to your hospital room. 15. If you have a Living Will or Durable Power of , please bring a copy on the day of your procedure. 15. With your permission, one family member may accompany you while you are being prepared for surgery. Once you are ready, additional family members may join you. HOW WE KEEP YOU SAFE and WORK TO PREVENT SURGICAL SITE INFECTIONS:  1. Health care workers should always check your ID bracelet to verify your name and birth date. You will be asked many times to state your name, date of birth, and allergies. 2. Health care workers should always clean their hands with soap or alcohol gel before providing care to you. It is okay to ask anyone if they cleaned their hands before they touch you. 3. You will be actively involved in verifying the type of procedure you are having and ensuring the correct surgical site. This will be confirmed multiple times prior to your procedure. Do NOT bautista your surgery site UNLESS instructed to by your surgeon. 4. Do not shave or wax for 72 hours prior to procedure near your operative site. Shaving with a razor can irritate your skin and make it easier to develop an infection. On the day of your procedure, any hair that needs to be removed near the surgical site will be clipped by a healthcare worker using a special clippers designed to avoid skin irritation. 5. When you are in the operating room, your surgical site will be cleansed with a special soap, and in most cases, you will be given an antibiotic before the surgery begins. What to expect AFTER YOUR PROCEDURE:  1.  Immediately following your procedure, your will be taken to the PACU for

## 2021-01-19 ENCOUNTER — PATIENT MESSAGE (OUTPATIENT)
Dept: PRIMARY CARE CLINIC | Age: 56
End: 2021-01-19

## 2021-01-19 NOTE — TELEPHONE ENCOUNTER
From: Paris Schilling  To: Rolando Conner MD  Sent: 1/19/2021 8:43 AM EST  Subject: Non-Urgent Claudia Rievra,    I have a visit with you this week and wanted to ask about the Shingles vaccine and COVID-19 immunization prior to coming in. I've already had the first vaccination and was wondering if now would be a good time to follow-up with the second. And do you think I can get the immunization? I'll start radiation soon and want to stay as healthy as possible. Let me know your thoughts.     Thank you, Rajinder Gonzales

## 2021-01-21 ENCOUNTER — OFFICE VISIT (OUTPATIENT)
Dept: PRIMARY CARE CLINIC | Age: 56
End: 2021-01-21
Payer: COMMERCIAL

## 2021-01-21 VITALS
BODY MASS INDEX: 29.6 KG/M2 | WEIGHT: 189 LBS | HEART RATE: 87 BPM | TEMPERATURE: 97.1 F | SYSTOLIC BLOOD PRESSURE: 108 MMHG | DIASTOLIC BLOOD PRESSURE: 72 MMHG

## 2021-01-21 DIAGNOSIS — F33.41 RECURRENT MAJOR DEPRESSIVE DISORDER, IN PARTIAL REMISSION (HCC): ICD-10-CM

## 2021-01-21 DIAGNOSIS — G47.33 OBSTRUCTIVE SLEEP APNEA: ICD-10-CM

## 2021-01-21 DIAGNOSIS — C50.911 MALIGNANT NEOPLASM OF RIGHT FEMALE BREAST, UNSPECIFIED ESTROGEN RECEPTOR STATUS, UNSPECIFIED SITE OF BREAST (HCC): Primary | ICD-10-CM

## 2021-01-21 DIAGNOSIS — Z01.818 PREOP EXAMINATION: ICD-10-CM

## 2021-01-21 DIAGNOSIS — Z01.818 PREOP EXAMINATION: Primary | ICD-10-CM

## 2021-01-21 DIAGNOSIS — R11.2 NON-INTRACTABLE VOMITING WITH NAUSEA, UNSPECIFIED VOMITING TYPE: ICD-10-CM

## 2021-01-21 DIAGNOSIS — E78.00 HYPERCHOLESTEREMIA: ICD-10-CM

## 2021-01-21 LAB
BASOPHILS ABSOLUTE: 0.1 K/UL (ref 0–0.2)
BASOPHILS RELATIVE PERCENT: 1 %
EOSINOPHILS ABSOLUTE: 0.2 K/UL (ref 0–0.6)
EOSINOPHILS RELATIVE PERCENT: 4.1 %
HCT VFR BLD CALC: 42.6 % (ref 36–48)
HEMOGLOBIN: 14.4 G/DL (ref 12–16)
LYMPHOCYTES ABSOLUTE: 2 K/UL (ref 1–5.1)
LYMPHOCYTES RELATIVE PERCENT: 33 %
MCH RBC QN AUTO: 31.1 PG (ref 26–34)
MCHC RBC AUTO-ENTMCNC: 33.7 G/DL (ref 31–36)
MCV RBC AUTO: 92.3 FL (ref 80–100)
MONOCYTES ABSOLUTE: 0.5 K/UL (ref 0–1.3)
MONOCYTES RELATIVE PERCENT: 8.7 %
NEUTROPHILS ABSOLUTE: 3.2 K/UL (ref 1.7–7.7)
NEUTROPHILS RELATIVE PERCENT: 53.2 %
PDW BLD-RTO: 12.9 % (ref 12.4–15.4)
PLATELET # BLD: 295 K/UL (ref 135–450)
PMV BLD AUTO: 8.3 FL (ref 5–10.5)
RBC # BLD: 4.62 M/UL (ref 4–5.2)
SARS-COV-2: NOT DETECTED
WBC # BLD: 5.9 K/UL (ref 4–11)

## 2021-01-21 PROCEDURE — 99211 OFF/OP EST MAY X REQ PHY/QHP: CPT | Performed by: NURSE PRACTITIONER

## 2021-01-21 PROCEDURE — 99243 OFF/OP CNSLTJ NEW/EST LOW 30: CPT | Performed by: FAMILY MEDICINE

## 2021-01-21 ASSESSMENT — ENCOUNTER SYMPTOMS
SHORTNESS OF BREATH: 0
NAUSEA: 1
VOMITING: 1
COLOR CHANGE: 0
RHINORRHEA: 0
DIARRHEA: 0
EYE PAIN: 0
COUGH: 0
SORE THROAT: 0
CONSTIPATION: 0
ABDOMINAL PAIN: 0

## 2021-01-21 ASSESSMENT — PATIENT HEALTH QUESTIONNAIRE - PHQ9
9. THOUGHTS THAT YOU WOULD BE BETTER OFF DEAD, OR OF HURTING YOURSELF: 0
3. TROUBLE FALLING OR STAYING ASLEEP: 0
8. MOVING OR SPEAKING SO SLOWLY THAT OTHER PEOPLE COULD HAVE NOTICED. OR THE OPPOSITE, BEING SO FIGETY OR RESTLESS THAT YOU HAVE BEEN MOVING AROUND A LOT MORE THAN USUAL: 0
4. FEELING TIRED OR HAVING LITTLE ENERGY: 0

## 2021-01-21 NOTE — Clinical Note
Dr. Rupetro Mohr,  Thank you for allowing me to participate in Iris's surgical care. She is cleared from my perspective and I expect her to do well. Of note I think it would be helpful if she got some Zofran prior to surgery as she really struggles with postoperative nausea and vomiting.   Thanks, Martha Barron MD

## 2021-01-21 NOTE — PROGRESS NOTES
Chief Complaint   Patient presents with    Pre-op Exam     biopsy 1/26/21 @ Dr Emanuel Lee Ra       HPI: Mandy Vogel presents for evaluation and management of preoperative exam for sentinel node biopsy for right-sided breast cancer found incidentally in October when she had a breast reduction. Plan is for surgery on January 26, 2021 to be done by Dr. Chadwick Treadwell. She notes no easy bleeding or bruising but she does have significant postoperative nausea and vomiting. She also has a history of sleep apnea and uses CPAP which she states has helped her tremendously with her fatigue. She has a history of hypercholesterolemia and is taking and tolerating her Zocor for this. Notes no abdominal pain or myalgia. She tells me that over the last 6 weeks she has had some intermittent episodic vomiting in the middle of the night usually dry heave and bile product. She has a history of depression that has been well controlled with medication. PHQ-9 Total Score: 7 (1/21/2021  3:15 PM)  Thoughts that you would be better off dead, or of hurting yourself in some way: 0 (1/21/2021  3:15 PM)      Review of Systems   Constitutional: Negative for chills and fever. HENT: Negative for ear pain, rhinorrhea and sore throat. Eyes: Negative for pain and visual disturbance. Respiratory: Negative for cough and shortness of breath. Cardiovascular: Negative for chest pain and palpitations. Gastrointestinal: Positive for nausea and vomiting. Negative for abdominal pain, constipation and diarrhea. Genitourinary: Negative for dysuria and frequency. Musculoskeletal: Negative for joint swelling and myalgias. Skin: Negative for color change and rash. Neurological: Negative for weakness, numbness and headaches. Hematological: Negative for adenopathy. Does not bruise/bleed easily. Psychiatric/Behavioral: Negative for dysphoric mood, self-injury and suicidal ideas. The patient is not nervous/anxious.         No Known Allergies  New Prescriptions    No medications on file     Current Outpatient Medications   Medication Sig Dispense Refill    pramipexole (MIRAPEX) 0.25 MG tablet Take 1 tablet by mouth nightly 90 tablet 3    carbidopa-levodopa (SINEMET)  MG per tablet TAKE 1 TABLET BY MOUTH ONE TIME A DAY 90 tablet 3    simvastatin (ZOCOR) 40 MG tablet TAKE 1 TABLET BY MOUTH NIGHTLY 90 tablet 3    venlafaxine (EFFEXOR XR) 75 MG extended release capsule TAKE 1 CAPSULE BY MOUTH ONE TIME A DAY 90 capsule 3    Multiple Vitamins-Minerals (MULTIVITAL PO) Take by mouth      cetirizine (ZYRTEC) 10 MG tablet Take 10 mg by mouth daily       No current facility-administered medications for this visit. Past Medical History:   Diagnosis Date    Anxiety     Breast cancer, right breast (HCC)     Hypercholesteremia     Obstructive sleep apnea (adult) (pediatric)     APAP 10-18 (ave 12 cwp)    PONV (postoperative nausea and vomiting)     Restless leg          Objective   /72   Pulse 87   Temp 97.1 °F (36.2 °C) (Temporal)   Wt 189 lb (85.7 kg)   LMP  (LMP Unknown)   Breastfeeding No   BMI 29.60 kg/m²   Wt Readings from Last 3 Encounters:   01/21/21 189 lb (85.7 kg)   12/16/20 190 lb 6.4 oz (86.4 kg)   12/14/20 191 lb (86.6 kg)       Physical Exam  Constitutional:       Appearance: She is well-developed. HENT:      Head: Normocephalic and atraumatic. Nose: Nose normal.      Mouth/Throat:      Pharynx: No oropharyngeal exudate. Comments: Class IV airway  Eyes:      General: No scleral icterus. Right eye: No discharge. Left eye: No discharge. Pupils: Pupils are equal, round, and reactive to light. Neck:      Musculoskeletal: Normal range of motion and neck supple. Thyroid: No thyromegaly. Cardiovascular:      Rate and Rhythm: Normal rate and regular rhythm. Pulses:           Dorsalis pedis pulses are 2+ on the right side and 2+ on the left side.         Posterior tibial pulses are 2+ on the right side and 2+ on the left side. Heart sounds: Normal heart sounds. No murmur. No friction rub. No gallop. Comments: No Edema Lower Extremities  Pulmonary:      Effort: Pulmonary effort is normal.      Breath sounds: Normal breath sounds. No wheezing or rales. Abdominal:      General: Bowel sounds are normal. There is no distension. Palpations: Abdomen is soft. There is no hepatomegaly or splenomegaly. Tenderness: There is no abdominal tenderness. There is no guarding or rebound. Musculoskeletal: Normal range of motion. General: No tenderness or deformity. Lymphadenopathy:      Cervical: No cervical adenopathy. Skin:     General: Skin is warm and dry. Findings: No erythema or rash. Neurological:      Mental Status: She is alert. Cranial Nerves: No cranial nerve deficit. Sensory: No sensory deficit.       Gait: Gait normal.   Psychiatric:         Speech: Speech normal.         Behavior: Behavior normal.           Chemistry        Component Value Date/Time     10/02/2020 0808    K 4.5 10/02/2020 0808     10/02/2020 0808    CO2 28 10/02/2020 0808    BUN 17 10/02/2020 0808    CREATININE 0.9 10/02/2020 0808        Component Value Date/Time    CALCIUM 9.3 10/02/2020 0808    ALKPHOS 88 10/02/2020 0808    AST 22 10/02/2020 0808    ALT 17 10/02/2020 0808    BILITOT <0.2 10/02/2020 0808          Lab Results   Component Value Date    WBC 6.6 10/02/2020    HGB 14.8 10/02/2020    HCT 44.1 10/02/2020    MCV 91.4 10/02/2020     10/02/2020     No results found for: LABA1C  No results found for: EAG  No results found for: LABA1C  No components found for: CHLPL  Lab Results   Component Value Date    TRIG 164 (H) 10/02/2020    TRIG 130 07/23/2019    TRIG 164 05/23/2019     Lab Results   Component Value Date    HDL 46 10/02/2020    HDL 59 07/23/2019    HDL 45 05/23/2019     Lab Results   Component Value Date    LDLCALC 150 (H) 10/02/2020 LDLCALC 140 (H) 07/23/2019    LDLCALC 111 05/23/2019     Lab Results   Component Value Date    LABVLDL 33 10/02/2020    LABVLDL 26 07/23/2019    LABVLDL 40 04/09/2018         Assessment   Plan     1. Malignant neoplasm of right female breast, unspecified estrogen receptor status, unspecified site of breast [facility])  Patient is appropriate for sentinel node biopsy. She is cleared for surgery and felt to be low risk. It would be helpful for her to get some prophylactic antiemetics such as Zofran. Preparation for difficult intubation would be reasonable with a glide scope and patient instructed to bring her CPAP with her. 2. Preop examination  As above  - CBC Auto Differential; Future  - Comprehensive Metabolic Panel; Future    3. Obstructive sleep apnea  Patient counseled to bring her CPAP    4. Hypercholesteremia  Treating: Continue meds and recheck in 6 months  - Comprehensive Metabolic Panel; Future    5. Recurrent major depressive disorder, in partial remission (HCC)  Controlled: Appears stable. We will continue current management and monitor for adverse reaction and disease progression. Follow-up as noted below      6. Non-intractable vomiting with nausea, unspecified vomiting type  We will check labs to make sure she does not have a metabolic imbalance prior to surgery. - CBC Auto Differential; Future  - Comprehensive Metabolic Panel; Future  and instructed them to bring it with them to her next appointment. Discussed use, benefit, and side effects of prescribed medications. Barriers to medication compliance addressed. All patient questions answered. Pt voiced understanding.        Health Maintenance   Topic Date Due    Diabetes screen  01/15/2005    Shingles Vaccine (1 of 2) 01/15/2015    DTaP/Tdap/Td vaccine (2 - Td) 07/06/2021    Lipid screen  10/02/2021    Breast cancer screen  12/16/2022    Colon cancer screen colonoscopy  06/11/2025    Flu vaccine  Completed    Hepatitis C screen Completed    HIV screen  Completed    Hepatitis A vaccine  Aged Out    Hepatitis B vaccine  Aged Out    Hib vaccine  Aged Out    Meningococcal (ACWY) vaccine  Aged Out    Pneumococcal 0-64 years Vaccine  Aged Out       RTC 6 months and as needed

## 2021-01-22 LAB
A/G RATIO: 2.2 (ref 1.1–2.2)
ALBUMIN SERPL-MCNC: 4.7 G/DL (ref 3.4–5)
ALP BLD-CCNC: 95 U/L (ref 40–129)
ALT SERPL-CCNC: 21 U/L (ref 10–40)
ANION GAP SERPL CALCULATED.3IONS-SCNC: 11 MMOL/L (ref 3–16)
AST SERPL-CCNC: 23 U/L (ref 15–37)
BILIRUB SERPL-MCNC: 0.3 MG/DL (ref 0–1)
BUN BLDV-MCNC: 14 MG/DL (ref 7–20)
CALCIUM SERPL-MCNC: 9.8 MG/DL (ref 8.3–10.6)
CHLORIDE BLD-SCNC: 103 MMOL/L (ref 99–110)
CO2: 29 MMOL/L (ref 21–32)
CREAT SERPL-MCNC: 1.1 MG/DL (ref 0.6–1.1)
GFR AFRICAN AMERICAN: >60
GFR NON-AFRICAN AMERICAN: 51
GLOBULIN: 2.1 G/DL
GLUCOSE BLD-MCNC: 90 MG/DL (ref 70–99)
POTASSIUM SERPL-SCNC: 4.8 MMOL/L (ref 3.5–5.1)
SODIUM BLD-SCNC: 143 MMOL/L (ref 136–145)
TOTAL PROTEIN: 6.8 G/DL (ref 6.4–8.2)

## 2021-01-25 ENCOUNTER — TELEPHONE (OUTPATIENT)
Dept: SURGERY | Age: 56
End: 2021-01-25

## 2021-01-25 ENCOUNTER — ANESTHESIA EVENT (OUTPATIENT)
Dept: OPERATING ROOM | Age: 56
End: 2021-01-25
Payer: COMMERCIAL

## 2021-01-25 NOTE — TELEPHONE ENCOUNTER
TELEPHONE NOTE    I called to follow up with Sol Yoon. We reviewed the plan for tomorrow, and when she should expect to return back to work She has no additional questions at this time. Kelsey Ivey.  Evelyn Keita,   Breast Surgical Oncology    [facility]  Phone: [phone] (option 3)

## 2021-01-26 ENCOUNTER — HOSPITAL ENCOUNTER (OUTPATIENT)
Dept: NUCLEAR MEDICINE | Age: 56
Discharge: HOME | End: 2021-01-26
Payer: COMMERCIAL

## 2021-01-26 ENCOUNTER — HOSPITAL ENCOUNTER (OUTPATIENT)
Age: 56
Setting detail: OUTPATIENT SURGERY
Discharge: HOME | End: 2021-01-26
Attending: SURGERY | Admitting: SURGERY
Payer: COMMERCIAL

## 2021-01-26 ENCOUNTER — ANESTHESIA (OUTPATIENT)
Dept: OPERATING ROOM | Age: 56
End: 2021-01-26
Payer: COMMERCIAL

## 2021-01-26 VITALS
SYSTOLIC BLOOD PRESSURE: 136 MMHG | HEART RATE: 93 BPM | HEIGHT: 67 IN | RESPIRATION RATE: 14 BRPM | WEIGHT: 185 LBS | TEMPERATURE: 96.7 F | BODY MASS INDEX: 29.03 KG/M2 | OXYGEN SATURATION: 96 % | DIASTOLIC BLOOD PRESSURE: 88 MMHG

## 2021-01-26 VITALS — OXYGEN SATURATION: 100 % | SYSTOLIC BLOOD PRESSURE: 100 MMHG | DIASTOLIC BLOOD PRESSURE: 67 MMHG

## 2021-01-26 DIAGNOSIS — Z17.0 MALIGNANT NEOPLASM OF OVERLAPPING SITES OF RIGHT BREAST IN FEMALE, ESTROGEN RECEPTOR POSITIVE (HCC): ICD-10-CM

## 2021-01-26 DIAGNOSIS — C50.811 MALIGNANT NEOPLASM OF OVERLAPPING SITES OF RIGHT FEMALE BREAST, UNSPECIFIED ESTROGEN RECEPTOR STATUS (HCC): ICD-10-CM

## 2021-01-26 DIAGNOSIS — C50.811 MALIGNANT NEOPLASM OF OVERLAPPING SITES OF RIGHT BREAST IN FEMALE, ESTROGEN RECEPTOR POSITIVE (HCC): ICD-10-CM

## 2021-01-26 PROCEDURE — 7100000000 HC PACU RECOVERY - FIRST 15 MIN: Performed by: SURGERY

## 2021-01-26 PROCEDURE — 6360000002 HC RX W HCPCS: Performed by: NURSE ANESTHETIST, CERTIFIED REGISTERED

## 2021-01-26 PROCEDURE — 2580000003 HC RX 258: Performed by: ANESTHESIOLOGY

## 2021-01-26 PROCEDURE — 3600000014 HC SURGERY LEVEL 4 ADDTL 15MIN: Performed by: SURGERY

## 2021-01-26 PROCEDURE — 7100000011 HC PHASE II RECOVERY - ADDTL 15 MIN: Performed by: SURGERY

## 2021-01-26 PROCEDURE — 6360000002 HC RX W HCPCS: Performed by: SURGERY

## 2021-01-26 PROCEDURE — 38525 BIOPSY/REMOVAL LYMPH NODES: CPT | Performed by: SURGERY

## 2021-01-26 PROCEDURE — 88342 IMHCHEM/IMCYTCHM 1ST ANTB: CPT

## 2021-01-26 PROCEDURE — 3600000004 HC SURGERY LEVEL 4 BASE: Performed by: SURGERY

## 2021-01-26 PROCEDURE — 3700000001 HC ADD 15 MINUTES (ANESTHESIA): Performed by: SURGERY

## 2021-01-26 PROCEDURE — 6370000000 HC RX 637 (ALT 250 FOR IP): Performed by: ANESTHESIOLOGY

## 2021-01-26 PROCEDURE — 88307 TISSUE EXAM BY PATHOLOGIST: CPT

## 2021-01-26 PROCEDURE — 2500000003 HC RX 250 WO HCPCS: Performed by: NURSE ANESTHETIST, CERTIFIED REGISTERED

## 2021-01-26 PROCEDURE — 38900 IO MAP OF SENT LYMPH NODE: CPT | Performed by: SURGERY

## 2021-01-26 PROCEDURE — A9541 TC99M SULFUR COLLOID: HCPCS | Performed by: SURGERY

## 2021-01-26 PROCEDURE — 3700000000 HC ANESTHESIA ATTENDED CARE: Performed by: SURGERY

## 2021-01-26 PROCEDURE — 2709999900 HC NON-CHARGEABLE SUPPLY: Performed by: SURGERY

## 2021-01-26 PROCEDURE — 7100000001 HC PACU RECOVERY - ADDTL 15 MIN: Performed by: SURGERY

## 2021-01-26 PROCEDURE — 3430000000 HC RX DIAGNOSTIC RADIOPHARMACEUTICAL: Performed by: SURGERY

## 2021-01-26 PROCEDURE — 38792 RA TRACER ID OF SENTINL NODE: CPT

## 2021-01-26 PROCEDURE — 7100000010 HC PHASE II RECOVERY - FIRST 15 MIN: Performed by: SURGERY

## 2021-01-26 PROCEDURE — 2500000003 HC RX 250 WO HCPCS: Performed by: SURGERY

## 2021-01-26 RX ORDER — LABETALOL 20 MG/4 ML (5 MG/ML) INTRAVENOUS SYRINGE
5 EVERY 10 MIN PRN
Status: DISCONTINUED | OUTPATIENT
Start: 2021-01-26 | End: 2021-01-26 | Stop reason: HOSPADM

## 2021-01-26 RX ORDER — BUPIVACAINE HYDROCHLORIDE 5 MG/ML
INJECTION, SOLUTION EPIDURAL; INTRACAUDAL PRN
Status: DISCONTINUED | OUTPATIENT
Start: 2021-01-26 | End: 2021-01-26 | Stop reason: ALTCHOICE

## 2021-01-26 RX ORDER — FENTANYL CITRATE 50 UG/ML
INJECTION, SOLUTION INTRAMUSCULAR; INTRAVENOUS PRN
Status: DISCONTINUED | OUTPATIENT
Start: 2021-01-26 | End: 2021-01-26 | Stop reason: SDUPTHER

## 2021-01-26 RX ORDER — LIDOCAINE HYDROCHLORIDE 20 MG/ML
INJECTION, SOLUTION INTRAVENOUS PRN
Status: DISCONTINUED | OUTPATIENT
Start: 2021-01-26 | End: 2021-01-26 | Stop reason: SDUPTHER

## 2021-01-26 RX ORDER — METOCLOPRAMIDE HYDROCHLORIDE 5 MG/ML
10 INJECTION INTRAMUSCULAR; INTRAVENOUS
Status: DISCONTINUED | OUTPATIENT
Start: 2021-01-26 | End: 2021-01-26 | Stop reason: HOSPADM

## 2021-01-26 RX ORDER — CEFAZOLIN SODIUM 2 G/50ML
2 SOLUTION INTRAVENOUS
Status: COMPLETED | OUTPATIENT
Start: 2021-01-26 | End: 2021-01-26

## 2021-01-26 RX ORDER — HYDRALAZINE HYDROCHLORIDE 20 MG/ML
5 INJECTION INTRAMUSCULAR; INTRAVENOUS EVERY 10 MIN PRN
Status: DISCONTINUED | OUTPATIENT
Start: 2021-01-26 | End: 2021-01-26 | Stop reason: HOSPADM

## 2021-01-26 RX ORDER — SODIUM CHLORIDE, SODIUM LACTATE, POTASSIUM CHLORIDE, CALCIUM CHLORIDE 600; 310; 30; 20 MG/100ML; MG/100ML; MG/100ML; MG/100ML
INJECTION, SOLUTION INTRAVENOUS CONTINUOUS
Status: DISCONTINUED | OUTPATIENT
Start: 2021-01-26 | End: 2021-01-26 | Stop reason: HOSPADM

## 2021-01-26 RX ORDER — ISOSULFAN BLUE 50 MG/5ML
INJECTION, SOLUTION SUBCUTANEOUS PRN
Status: DISCONTINUED | OUTPATIENT
Start: 2021-01-26 | End: 2021-01-26 | Stop reason: ALTCHOICE

## 2021-01-26 RX ORDER — MIDAZOLAM HYDROCHLORIDE 1 MG/ML
INJECTION INTRAMUSCULAR; INTRAVENOUS PRN
Status: DISCONTINUED | OUTPATIENT
Start: 2021-01-26 | End: 2021-01-26 | Stop reason: SDUPTHER

## 2021-01-26 RX ORDER — DEXAMETHASONE SODIUM PHOSPHATE 10 MG/ML
8 INJECTION, SOLUTION INTRAMUSCULAR; INTRAVENOUS ONCE
Status: COMPLETED | OUTPATIENT
Start: 2021-01-26 | End: 2021-01-26

## 2021-01-26 RX ORDER — ONDANSETRON 4 MG/1
4 TABLET, ORALLY DISINTEGRATING ORAL 3 TIMES DAILY PRN
Qty: 8 TABLET | Refills: 0 | Status: SHIPPED | OUTPATIENT
Start: 2021-01-26 | End: 2022-07-21 | Stop reason: SDUPTHER

## 2021-01-26 RX ORDER — DIPHENHYDRAMINE HYDROCHLORIDE 50 MG/ML
12.5 INJECTION INTRAMUSCULAR; INTRAVENOUS
Status: DISCONTINUED | OUTPATIENT
Start: 2021-01-26 | End: 2021-01-26 | Stop reason: HOSPADM

## 2021-01-26 RX ORDER — PROPOFOL 10 MG/ML
INJECTION, EMULSION INTRAVENOUS PRN
Status: DISCONTINUED | OUTPATIENT
Start: 2021-01-26 | End: 2021-01-26 | Stop reason: SDUPTHER

## 2021-01-26 RX ORDER — SCOLOPAMINE TRANSDERMAL SYSTEM 1 MG/1
1 PATCH, EXTENDED RELEASE TRANSDERMAL ONCE
Status: DISCONTINUED | OUTPATIENT
Start: 2021-01-26 | End: 2021-01-26 | Stop reason: HOSPADM

## 2021-01-26 RX ORDER — OXYCODONE HYDROCHLORIDE 5 MG/1
10 TABLET ORAL PRN
Status: DISCONTINUED | OUTPATIENT
Start: 2021-01-26 | End: 2021-01-26 | Stop reason: HOSPADM

## 2021-01-26 RX ORDER — SODIUM CHLORIDE 0.9 % (FLUSH) 0.9 %
10 SYRINGE (ML) INJECTION PRN
Status: DISCONTINUED | OUTPATIENT
Start: 2021-01-26 | End: 2021-01-26 | Stop reason: HOSPADM

## 2021-01-26 RX ORDER — OXYCODONE HYDROCHLORIDE 5 MG/1
5 TABLET ORAL PRN
Status: DISCONTINUED | OUTPATIENT
Start: 2021-01-26 | End: 2021-01-26 | Stop reason: HOSPADM

## 2021-01-26 RX ORDER — MORPHINE SULFATE 4 MG/ML
1 INJECTION, SOLUTION INTRAMUSCULAR; INTRAVENOUS EVERY 5 MIN PRN
Status: DISCONTINUED | OUTPATIENT
Start: 2021-01-26 | End: 2021-01-26 | Stop reason: HOSPADM

## 2021-01-26 RX ORDER — SODIUM CHLORIDE 0.9 % (FLUSH) 0.9 %
10 SYRINGE (ML) INJECTION EVERY 12 HOURS SCHEDULED
Status: DISCONTINUED | OUTPATIENT
Start: 2021-01-26 | End: 2021-01-26 | Stop reason: HOSPADM

## 2021-01-26 RX ORDER — PROMETHAZINE HYDROCHLORIDE 25 MG/ML
6.25 INJECTION, SOLUTION INTRAMUSCULAR; INTRAVENOUS
Status: DISCONTINUED | OUTPATIENT
Start: 2021-01-26 | End: 2021-01-26 | Stop reason: HOSPADM

## 2021-01-26 RX ORDER — MEPERIDINE HYDROCHLORIDE 25 MG/ML
12.5 INJECTION INTRAMUSCULAR; INTRAVENOUS; SUBCUTANEOUS EVERY 5 MIN PRN
Status: DISCONTINUED | OUTPATIENT
Start: 2021-01-26 | End: 2021-01-26 | Stop reason: HOSPADM

## 2021-01-26 RX ADMIN — PROPOFOL 150 MG: 10 INJECTION, EMULSION INTRAVENOUS at 10:47

## 2021-01-26 RX ADMIN — CEFAZOLIN SODIUM 2 G: 2 SOLUTION INTRAVENOUS at 10:54

## 2021-01-26 RX ADMIN — SODIUM CHLORIDE, POTASSIUM CHLORIDE, SODIUM LACTATE AND CALCIUM CHLORIDE: 600; 310; 30; 20 INJECTION, SOLUTION INTRAVENOUS at 11:50

## 2021-01-26 RX ADMIN — SODIUM CHLORIDE, POTASSIUM CHLORIDE, SODIUM LACTATE AND CALCIUM CHLORIDE: 600; 310; 30; 20 INJECTION, SOLUTION INTRAVENOUS at 10:00

## 2021-01-26 RX ADMIN — Medication 808 MICRO CURIE: at 11:07

## 2021-01-26 RX ADMIN — DEXAMETHASONE SODIUM PHOSPHATE 8 MG: 10 INJECTION, SOLUTION INTRAMUSCULAR; INTRAVENOUS at 09:57

## 2021-01-26 RX ADMIN — LIDOCAINE HYDROCHLORIDE 100 MG: 20 INJECTION, SOLUTION INTRAVENOUS at 10:47

## 2021-01-26 RX ADMIN — SODIUM CHLORIDE, POTASSIUM CHLORIDE, SODIUM LACTATE AND CALCIUM CHLORIDE: 600; 310; 30; 20 INJECTION, SOLUTION INTRAVENOUS at 09:40

## 2021-01-26 RX ADMIN — MIDAZOLAM HYDROCHLORIDE 2 MG: 2 INJECTION, SOLUTION INTRAMUSCULAR; INTRAVENOUS at 10:37

## 2021-01-26 RX ADMIN — FENTANYL CITRATE 100 MCG: 50 INJECTION INTRAMUSCULAR; INTRAVENOUS at 10:43

## 2021-01-26 RX ADMIN — PROPOFOL 50 MG: 10 INJECTION, EMULSION INTRAVENOUS at 11:16

## 2021-01-26 RX ADMIN — FAMOTIDINE 20 MG: 10 INJECTION, SOLUTION INTRAVENOUS at 11:03

## 2021-01-26 ASSESSMENT — PULMONARY FUNCTION TESTS
PIF_VALUE: 2
PIF_VALUE: 5
PIF_VALUE: 4
PIF_VALUE: 4
PIF_VALUE: 5
PIF_VALUE: 5
PIF_VALUE: 4
PIF_VALUE: 6
PIF_VALUE: 4
PIF_VALUE: 12
PIF_VALUE: 4
PIF_VALUE: 3
PIF_VALUE: 5
PIF_VALUE: 4
PIF_VALUE: 5
PIF_VALUE: 4
PIF_VALUE: 4
PIF_VALUE: 5
PIF_VALUE: 16
PIF_VALUE: 4
PIF_VALUE: 4
PIF_VALUE: 6
PIF_VALUE: 4
PIF_VALUE: 4
PIF_VALUE: 16
PIF_VALUE: 4
PIF_VALUE: 5
PIF_VALUE: 0
PIF_VALUE: 6
PIF_VALUE: 5
PIF_VALUE: 16
PIF_VALUE: 1
PIF_VALUE: 4
PIF_VALUE: 5
PIF_VALUE: 17
PIF_VALUE: 4
PIF_VALUE: 4
PIF_VALUE: 5
PIF_VALUE: 5

## 2021-01-26 NOTE — PROGRESS NOTES
Patient admitted to PACU # 17 from OR at [address] post [facility] [address]. Unique Blount. Attached to PACU monitoring system and report received from anesthesia provider. Patient was reported to be hemodynamically stable during procedure. Patient drowsy on admission and denied pain. Pt R axillae surgical drsg c/d/i. No bleeding noted. Pt R fingers warm to touch with cap fill WNL and pt reports sensation to RUE. Ice pack applied. IVF infusing. Will continue to monitor.

## 2021-01-26 NOTE — OP NOTE
Breast Surgery Operative Note    Alex Leal  YOB: 1965  MRN: [id number]    DATE OF PROCEDURE  01/26/21     PREOPERATIVE DIAGNOSIS  Right breast cancer     POSTOPERATIVE DIAGNOSIS  Same    PROCEDURE  1. Injection of blue dye to the right breast  2. Right sentinel lymph node biopsy    ANESTHESIA  General    SURGEON  Delta Garza DO     ASSISTANT  Audrey Mix DO, PGY 4    ESTIMATED BLOOD LOSS  less than 50  ml    COMPLICATIONS  None apparent by completion of procedure    SPECIMENS REMOVED  1. Right axillary sentinel nodes    FINDINGS  The right sentinel lymph nodes were identified (blue and hot) and were grossly normal.     POST-OP CONDITION  Stable    DISPOSITION  To PACU    INDICATION FOR PROCEDURE  Alex Leal is a 64 y.o. woman who was incidentally found to have a right breast cancer following breast reduction surgery. I recommended that we return to the OR for  right sentinel lymph node biopsy for axillary staging. She presents today for her planned procedure. The indications for the planned procedure, along with the potential benefits and risks which include but are not limited to: reactions to medications/anesthesia, pain, infection, bleeding, injury to nerves and/or vessels, wound complications, seroma, lymphedema, numbness, scars, failure of dye migration, and need for additional procedures based on final pathology. All questions were answered, and she agreed to proceed. The surgical site was confirmed today. Consent was obtained. DESCRIPTION OF PROCEDURE  Alex Leal was then brought to the operating room and placed supine on the operating room table with her arms extended on boards. She was appropriately positioned and padded. Bilateral sequential compression devices were applied to both lower extremities and appropriate perioperative antibiotics (ancef) were administered.   After induction of anesthesia, a timeout was called confirming the correct patient and procedure and all were in agreement. Radioactive colloid was injected into the right breast by the nuclear medicine technician. I then injected 5 mL of isosulfan blue dye in the upper outer quadrant, and a five minute breast massage was performed. The patient was then prepped and draped in the standard surgical fashion. Attention was directed to the right axilla. A curvilinear incision was made at the lower edge of the axillary hairline and carried down through the dermis with electrocautery. The subcutaneous tissues were opened and the clavipectoral fascia was incised. Upon entering the axilla, the gamma probe and blue dye were utilized to guide localization of the sentinel nodes. A blue and radioactive lymph node (possibly 2 nodes) was identified and excised. Ex vivo counts were 603. Additionally, a second radioactive node was identified which was not blue. These were then passed off the field. The residual gamma probe activity within the axillary region was minimal (and was localized to a large lymphatic which was traveling directly to the large blue/hot node). The axilla was then assessed for other blue channels/nodes and palpably abnormal lymph nodes, of which there were none. Attention was then turned to the wound. Aggressive hemostasis was obtained with electrocautery. The wound was irrigated with sterile saline. 20 mL of 0.5% marcaine was infiltrated into the soft tissues surrounding the cavity and hemostasis was again confirmed. The deep dermal layer was then reapproximated with interrupted 3-0 Vicryl, followed by running subcuticular using 4-0 Monocryl. Dermabond was applied. The patient tolerated this procedure well, was awakened and transferred to the recovery room in stable condition. All instrument, sponge, and needle counts were correct. Her family was notified of intraoperative findings.     ELECTRONICALLY SIGNED BY Teresa Clemons DO  on 01/26/21 at 12:33 PM EST

## 2021-01-26 NOTE — LETTER
Daniel Barfield had surgery on 1/26/2021. She will be seeing me back for her postop visit on 2/3/2021. She is to remain out of work until her postop visit, at which time we will determine when she can go back to work. If you have any questions or concerns, please don't hesitate to call. Maggie Dunlap.  Ifeanyi Michelle, DO  Breast Surgical Oncology    [facility] Breast Surgery  Phone: [phone] (option 3)

## 2021-01-26 NOTE — ANESTHESIA PRE PROCEDURE
Department of Anesthesiology  Preprocedure Note       Name:  Alicja Rivera   Age:  64 y.o.  :  1965                                          MRN:  [id number]         Date:  2021      Surgeon: Sixto Smith):  Kenzie Bolden DO    Procedure: Procedure(s):  RIGHT SENTINEL LYMPH NODE BIOPSY    Medications prior to admission:   Prior to Admission medications    Medication Sig Start Date End Date Taking?  Authorizing Provider   pramipexole (MIRAPEX) 0.25 MG tablet Take 1 tablet by mouth nightly 10/2/20  Yes Amanda Lala MD   carbidopa-levodopa (SINEMET)  MG per tablet TAKE 1 TABLET BY MOUTH ONE TIME A DAY 20  Yes Amanda Lala MD   simvastatin (ZOCOR) 40 MG tablet TAKE 1 TABLET BY MOUTH NIGHTLY 20  Yes Paul Watt DO   venlafaxine (EFFEXOR XR) 75 MG extended release capsule TAKE 1 CAPSULE BY MOUTH ONE TIME A DAY 20  Yes Amanda Lala MD   cetirizine (ZYRTEC) 10 MG tablet Take 10 mg by mouth daily   Yes Historical Provider, MD   Multiple Vitamins-Minerals (MULTIVITAL PO) Take by mouth    Historical Provider, MD       Current medications:    Current Facility-Administered Medications   Medication Dose Route Frequency Provider Last Rate Last Admin    lactated ringers infusion   Intravenous Continuous  Radha,  mL/hr at 21 0940 New Bag at 21 0940    ceFAZolin (ANCEF) 2000 mg in dextrose 3 % 50 mL IVPB (duplex)  2 g Intravenous On Call to [address], DO        sodium chloride flush 0.9 % injection 10 mL  10 mL Intravenous 2 times per day Kenzie Bolden DO        sodium chloride flush 0.9 % injection 10 mL  10 mL Intravenous PRN Kenzie Bolden DO        HYDROmorphone (DILAUDID) injection 0.25 mg  0.25 mg Intravenous Q5 Min PRN Jillian Cloud MD        HYDROmorphone (DILAUDID) injection 0.5 mg  0.5 mg Intravenous Q5 Min PRN Jillian Cloud MD        morphine injection 1 mg  1 mg Intravenous Q5 Min PRN Jillian Cloud MD       Gavi Horn HYDROmorphone (DILAUDID) injection 0.5 mg  0.5 mg Intravenous Q5 Min PRN Dilcia Pedraza MD        oxyCODONE (ROXICODONE) immediate release tablet 5 mg  5 mg Oral PRN Dilcia Pedraza MD        Or    oxyCODONE (ROXICODONE) immediate release tablet 10 mg  10 mg Oral PRN Dilcia Pedraza MD        diphenhydrAMINE (BENADRYL) injection 12.5 mg  12.5 mg Intravenous Once PRN Dilcia Pedraza MD        metoclopramide (REGLAN) injection 10 mg  10 mg Intravenous Once PRN Dilcia Pedraza MD        promethazine (PHENERGAN) injection 6.25 mg  6.25 mg Intravenous Once PRN Dilcia Pedraza MD        labetalol (NORMODYNE;TRANDATE) injection syringe 5 mg  5 mg Intravenous Q10 Min PRN Dilcia Pedraza MD        hydrALAZINE (APRESOLINE) injection 5 mg  5 mg Intravenous Q10 Min PRN Dilcia Pedraza MD        meperidine (DEMEROL) injection 12.5 mg  12.5 mg Intravenous Q5 Min PRN Dilcia Pedraza MD         Facility-Administered Medications Ordered in Other Encounters   Medication Dose Route Frequency Provider Last Rate Last Admin    technetium filtered sulfur colloid solution 800 micro curie  800 micro curie Subcutaneous ONCE PRN Tierra Huang DO           Allergies:  No Known Allergies    Problem List:    Patient Active Problem List   Diagnosis Code    Depression F32.9    Hypercholesteremia E78.00    Achilles tendon rupture S86.019A    Lumbago M54.5    Obstructive sleep apnea G47.33    Tobacco abuse Z72.0    Rosacea L71.9    Periorificial dermatitis L71.0    Multiple benign melanocytic nevi D22.9    Breast cancer, right breast ([facility] [address] C50.911       Past Medical History:        Diagnosis Date    Anxiety     Breast cancer, right breast ([facility] [address]     Hypercholesteremia     Obstructive sleep apnea (adult) (pediatric)     APAP 10-18 (ave 12 cwp)    PONV (postoperative nausea and vomiting)     Restless leg        Past Surgical History:        Procedure Laterality Date    ACHILLES TENDON SURGERY Right     BREAST REDUCTION SURGERY  10/29/2020    BREAST REDUCTION SURGERY Bilateral 10/29/2020    BILATERAL BREAST REDUCTION AND SPY performed by Faustino Molina MD at [facility] N/A 2020    COLORECTAL CANCER SCREENING, HIGH RISK performed by Bhanu Bernal MD at [facility] reduction    HYSTERECTOMY      HYSTERECTOMY, VAGINAL      DUB:  Dr. Pepe Alvarez still in   [facility]       Social History:    Social History     Tobacco Use    Smoking status: Former Smoker     Packs/day: 0.25     Years: 22.00     Pack years: 5.50     Types: Cigarettes     Quit date: 2018     Years since quittin.5    Smokeless tobacco: Never Used    Tobacco comment: contemplative, in process of quitting , discussed with patient   Substance Use Topics    Alcohol use: Yes     Comment: occasionally                                Counseling given: Not Answered  Comment: contemplative, in process of quitting , discussed with patient      Vital Signs (Current):   Vitals:    21 1319 21 0843   BP:  123/84   Pulse:  84   Resp:  18   Temp:  98.8 °F (37.1 °C)   TempSrc:  Oral   SpO2:  94%   Weight: 185 lb (83.9 kg) 185 lb (83.9 kg)   Height: 5' 7\" (1.702 m) 5' 7\" (1.702 m)                                              BP Readings from Last 3 Encounters:   21 123/84   21 108/72   20 106/82       NPO Status: Time of last liquid consumption:                         Time of last solid consumption:                         Date of last liquid consumption: 21                        Date of last solid food consumption: 21    BMI:   Wt Readings from Last 3 Encounters:   21 185 lb (83.9 kg)   21 189 lb (85.7 kg)   20 190 lb 6.4 oz (86.4 kg)     Body mass index is 28.98 kg/m².     CBC:   Lab Results   Component Value Date    WBC 5.9 2021    RBC 4.62 2021    HGB 14.4 2021 HCT 42.6 01/21/2021    MCV 92.3 01/21/2021    RDW 12.9 01/21/2021     01/21/2021       CMP:   Lab Results   Component Value Date     01/21/2021    K 4.8 01/21/2021     01/21/2021    CO2 29 01/21/2021    BUN 14 01/21/2021    CREATININE 1.1 01/21/2021    GFRAA >60 01/21/2021    GFRAA >60 06/12/2013    AGRATIO 2.2 01/21/2021    LABGLOM 51 01/21/2021    GLUCOSE 90 01/21/2021    PROT 6.8 01/21/2021    PROT 6.9 11/12/2012    CALCIUM 9.8 01/21/2021    BILITOT 0.3 01/21/2021    ALKPHOS 95 01/21/2021    AST 23 01/21/2021    ALT 21 01/21/2021       POC Tests: No results for input(s): POCGLU, POCNA, POCK, POCCL, POCBUN, POCHEMO, POCHCT in the last 72 hours.     Coags:   Lab Results   Component Value Date    PROTIME 11.4 10/02/2020    INR 0.98 10/02/2020    APTT 36.8 10/02/2020       HCG (If Applicable): No results found for: PREGTESTUR, PREGSERUM, HCG, HCGQUANT     ABGs: No results found for: PHART, PO2ART, UXG1WRF, FYW1JVE, BEART, U2FZSCYV     Type & Screen (If Applicable):  No results found for: LABABO, LABRH    Drug/Infectious Status (If Applicable):  Lab Results   Component Value Date    HEPCAB Non-Reactive (Negative) 03/20/2013       COVID-19 Screening (If Applicable):   Lab Results   Component Value Date    COVID19 Not Detected 01/21/2021    COVID19 NOT DETECTED 10/23/2020         Anesthesia Evaluation  Patient summary reviewed and Nursing notes reviewed history of anesthetic complications:   Airway: Mallampati: II  TM distance: >3 FB   Neck ROM: full  Mouth opening: > = 3 FB Dental:          Pulmonary:   (+) sleep apnea:                             Cardiovascular:Negative CV ROS                      Neuro/Psych:   (+) neuromuscular disease: Parkinson's disease, psychiatric history:            GI/Hepatic/Renal: Neg GI/Hepatic/Renal ROS            Endo/Other: Negative Endo/Other ROS                    Abdominal:           Vascular:                                        Anesthesia Plan      general ASA 1    (59-year-old female presents for RIGHT SENTINEL LYMPH NODE BIOPSY. Plan general anesthesia with ASA standard monitors. Questions answered. Patient agreeable with anesthetic plan.  )  Induction: intravenous. Anesthetic plan and risks discussed with patient. Plan discussed with CRNA.     Attending anesthesiologist reviewed and agrees with Pre Eval content        Tyshawn Ni MD   1/26/2021

## 2021-01-26 NOTE — H&P
Jeanne Gillkuldeep    [id number]    [facility] Same Day Surgery Update H & P  Department of General Surgery   Surgical Service   Pre-operative History and Physical  Last H & P within the last 30 days. DIAGNOSIS:   Malignant neoplasm of overlapping sites of right breast in female, estrogen receptor positive ([facility] [address] [C50.811, Z17.0]    Procedure(s):  RIGHT SENTINEL LYMPH NODE BIOPSY     HISTORY OF PRESENT ILLNESS:   Patient S/P bilateral breast reduction was found to have right breast invasive and in-situ ductal carcinoma on pathology. She presents today for the above procedure. Covid 19:  Patient denies fever, chills, cough or known exposure to Covid-19.        Past Medical History:        Diagnosis Date    Anxiety     Breast cancer, right breast ([facility] [address]     Hypercholesteremia     Obstructive sleep apnea (adult) (pediatric)     APAP 10-18 (ave 12 cwp)    PONV (postoperative nausea and vomiting)     Restless leg      Past Surgical History:        Procedure Laterality Date    ACHILLES TENDON SURGERY Right     BREAST REDUCTION SURGERY  10/29/2020    BREAST REDUCTION SURGERY Bilateral 10/29/2020    BILATERAL BREAST REDUCTION AND SPY performed by Pro Beatty MD at [address] [facility] COLONOSCOPY N/A 6/11/2020    COLORECTAL CANCER SCREENING, HIGH RISK performed by Kale Cavazos MD at [address] [place] [address] reduction    HYSTERECTOMY      HYSTERECTOMY, VAGINAL  2002    DUB:  Dr. Laine Higgins still in   [place] [address]     Past Social History:  Social History     Socioeconomic History    Marital status: Single     Spouse name: Victor Manuel Oliver ([phone] POA)     Number of children: 0    Years of education: None    Highest education level: None   Occupational History    Occupation: [employer]   Social Needs    Financial resource strain: None    Food insecurity     Worry: None     Inability: None    Transportation needs     Medical: None Non-medical: None   Tobacco Use    Smoking status: Former Smoker     Packs/day: 0.25     Years: 22.00     Pack years: 5.50     Types: Cigarettes     Quit date: 2018     Years since quittin.5    Smokeless tobacco: Never Used    Tobacco comment: contemplative, in process of quitting , discussed with patient   Substance and Sexual Activity    Alcohol use: Yes     Comment: occasionally    Drug use: No    Sexual activity: Yes     Partners: Female   Lifestyle    Physical activity     Days per week: None     Minutes per session: None    Stress: None   Relationships    Social connections     Talks on phone: None     Gets together: None     Attends Confucianism service: None     Active member of club or organization: None     Attends meetings of clubs or organizations: None     Relationship status: None    Intimate partner violence     Fear of current or ex partner: None     Emotionally abused: None     Physically abused: None     Forced sexual activity: None   Other Topics Concern    None   Social History Narrative    None         Medications Prior to Admission:      Prior to Admission medications    Medication Sig Start Date End Date Taking? Authorizing Provider   pramipexole (MIRAPEX) 0.25 MG tablet Take 1 tablet by mouth nightly 10/2/20  Yes Malik Canada MD   carbidopa-levodopa (SINEMET)  MG per tablet TAKE 1 TABLET BY MOUTH ONE TIME A DAY 20  Yes Malik Canada MD   simvastatin (ZOCOR) 40 MG tablet TAKE 1 TABLET BY MOUTH NIGHTLY 20  Yes Shikha Ricketts DO   cetirizine (ZYRTEC) 10 MG tablet Take 10 mg by mouth daily   Yes Historical Provider, MD   venlafaxine (EFFEXOR XR) 75 MG extended release capsule TAKE 1 CAPSULE BY MOUTH ONE TIME A DAY 20   Malik Canada MD   Multiple Vitamins-Minerals (MULTIVITAL PO) Take by mouth    Historical Provider, MD         Allergies:  Patient has no known allergies.     PHYSICAL EXAM:      /84   Pulse 84   Temp 98.8 °F (37.1 °C) (Oral) Resp 18   Ht 5' 7\" (1.702 m)   Wt 185 lb (83.9 kg)   LMP  (LMP Unknown)   SpO2 94%   BMI 28.98 kg/m²      Airway:  Airway patent with no audible stridor    Heart:  regular rate and rhythm, No murmur noted    Lungs:  No increased work of breathing, good air exchange, clear to auscultation bilaterally, no crackles or wheezing    Abdomen:  soft, non-distended, non-tender, no rebound tenderness or guarding, normal active bowel sounds and no masses palpated    ASSESSMENT AND PLAN     Patient is a 64 y.o. female with above specified procedure planned. 1.  Patient seen and focused exam done today- no new changes since last physical exam on 1/21/21    2. Access to ancillary services are available per request of the provider.     TASH Hartman - CNP     1/26/2021

## 2021-01-26 NOTE — ANESTHESIA POSTPROCEDURE EVALUATION
Department of Anesthesiology  Postprocedure Note    Patient: Viviane Hill  MRN: [id number]  [place]: 1965  Date of evaluation: 1/26/2021  Time:  2:45 PM     Procedure Summary     Date: 01/26/21 Room / Location: [address] [place] [address] [address]    Anesthesia Start: 8424 Anesthesia Stop: 1210    Procedure: RIGHT SENTINEL LYMPH NODE BIOPSY (Right ) Diagnosis:       Malignant neoplasm of overlapping sites of right breast in female, estrogen receptor positive ([facility] [address]      (Malignant neoplasm of overlapping sites of right breast in female, estrogen receptor positive ([facility] [address] [C50.811, Z17.0])    Surgeons: Lizeth Hennessy DO Responsible Provider: Sigifredo Adams MD    Anesthesia Type: general ASA Status: 3          Anesthesia Type: general    Hortensia Phase I: Hortensia Score: 10    Hortensia Phase II: Hortensia Score: 10    Last vitals: Reviewed and per EMR flowsheets.        Anesthesia Post Evaluation    Patient location during evaluation: PACU  Patient participation: complete - patient participated  Level of consciousness: awake and alert  Pain score: 0  Airway patency: patent  Nausea & Vomiting: no nausea and no vomiting  Complications: no  Cardiovascular status: hemodynamically stable  Respiratory status: acceptable  Hydration status: euvolemic
No

## 2021-02-03 ENCOUNTER — OFFICE VISIT (OUTPATIENT)
Dept: SURGERY | Age: 56
End: 2021-02-03

## 2021-02-03 DIAGNOSIS — Z48.89 POSTOPERATIVE VISIT: ICD-10-CM

## 2021-02-03 DIAGNOSIS — C50.811 MALIGNANT NEOPLASM OF OVERLAPPING SITES OF RIGHT FEMALE BREAST, UNSPECIFIED ESTROGEN RECEPTOR STATUS (HCC): Primary | ICD-10-CM

## 2021-02-03 PROCEDURE — 99024 POSTOP FOLLOW-UP VISIT: CPT | Performed by: SURGERY

## 2021-02-03 NOTE — PATIENT INSTRUCTIONS
Please call if the swelling under your arm gets worse. I recommend that you perform self breast exams once a month. Call the office with any concerns. Patient Education        Breast Self-Exam: Care Instructions  Your Care Instructions     A breast self-exam is when you check your breasts for lumps or changes. This regular exam helps you learn how your breasts normally look and feel. Most breast problems or changes are not because of cancer. Breast self-exam is not a substitute for a mammogram. Having regular breast exams by your doctor and regular mammograms improve your chances of finding any problems with your breasts. Some women set a time each month to do a step-by-step breast self-exam. Other women like a less formal system. They might look at their breasts as they brush their teeth, or feel their breasts once in a while in the shower. If you notice a change in your breast, tell your doctor. Follow-up care is a key part of your treatment and safety. Be sure to make and go to all appointments, and call your doctor if you are having problems. It's also a good idea to know your test results and keep a list of the medicines you take. How do you do a breast self-exam?  · The best time to examine your breasts is usually one week after your menstrual period begins. Your breasts should not be tender then. If you do not have periods, you might do your exam on a day of the month that is easy to remember. · To examine your breasts:  ? Remove all your clothes above the waist and lie down. When you are lying down, your breast tissue spreads evenly over your chest wall, which makes it easier to feel all your breast tissue. ? Use the pads--not the fingertips--of the 3 middle fingers of your left hand to check your right breast. Move your fingers slowly in small coin-sized circles that overlap. ? Use three levels of pressure to feel of all your breast tissue.  Use light pressure to feel the tissue close to the skin surface. Use medium pressure to feel a little deeper. Use firm pressure to feel your tissue close to your breastbone and ribs. Use each pressure level to feel your breast tissue before moving on to the next spot. ? Check your entire breast, moving up and down as if following a strip from the collarbone to the bra line, and from the armpit to the ribs. Repeat until you have covered the entire breast.  ? Repeat this procedure for your left breast, using the pads of the 3 middle fingers of your right hand. · To examine your breasts while in the shower:  ? Place one arm over your head and lightly soap your breast on that side. ? Using the pads of your fingers, gently move your hand over your breast (in the strip pattern described above), feeling carefully for any lumps or changes. ? Repeat for the other breast.  · Have your doctor inspect anything you notice to see if you need further testing. Where can you learn more? Go to https://[place].[employer]. org and sign in to your [employer] account. Enter P148 in the [place] box to learn more about \"Breast Self-Exam: Care Instructions. \"     If you do not have an account, please click on the \"Sign Up Now\" link. Current as of: April 29, 2020               Content Version: 12.6  © 4592-2264 [employer]. Care instructions adapted under license by [facility] ([facility]). If you have questions about a medical condition or this instruction, always ask your healthcare professional. [place] [address] any warranty or liability for your use of this information.

## 2021-02-03 NOTE — LETTER
[facility] Breast Surgery  [place] [address]  Phone: [phone]  Fax: [phone]    Miley Ferrer, DO        February 3, 2021     Patient: Nj Yip   YOB: 1965   Date of Visit: 2/3/2021       To Whom It May Concern:     Chelsea Valdovinos is cleared to return to work tomorrow, 2/4/21, with no restrictions. If you have any questions or concerns, please don't hesitate to call.     Sincerely,        Miley Ferrer, DO

## 2021-02-11 DIAGNOSIS — Z79.811 PREVENTATIVE USE OF AROMATASE INHIBITORS: Primary | ICD-10-CM

## 2021-02-12 ENCOUNTER — HOSPITAL ENCOUNTER (OUTPATIENT)
Dept: GENERAL RADIOLOGY | Age: 56
Discharge: HOME | End: 2021-02-12
Payer: COMMERCIAL

## 2021-02-12 DIAGNOSIS — Z79.811 PREVENTATIVE USE OF AROMATASE INHIBITORS: ICD-10-CM

## 2021-02-12 PROCEDURE — 77080 DXA BONE DENSITY AXIAL: CPT

## 2021-03-03 ENCOUNTER — OFFICE VISIT (OUTPATIENT)
Dept: SURGERY | Age: 56
End: 2021-03-03
Payer: COMMERCIAL

## 2021-03-03 VITALS
BODY MASS INDEX: 30.01 KG/M2 | TEMPERATURE: 97.2 F | OXYGEN SATURATION: 98 % | SYSTOLIC BLOOD PRESSURE: 121 MMHG | DIASTOLIC BLOOD PRESSURE: 78 MMHG | HEART RATE: 88 BPM | WEIGHT: 191.6 LBS

## 2021-03-03 DIAGNOSIS — Z09 POSTOP CHECK: Primary | ICD-10-CM

## 2021-03-03 PROCEDURE — 99213 OFFICE O/P EST LOW 20 MIN: CPT | Performed by: SURGERY

## 2021-03-03 NOTE — PROGRESS NOTES
[facility]    PROCEDURE: BBR  DATE: 10/29/20    Alysha Acosta has been recovering well since her procedure. Pain has been well controlled without pain medications. Since her last evaluation, she underwent a sentinel lymph node biopsy with Dr. Romulo Duran for staging. Her nodes were negative for malignancy and she is now working with radiation oncology.       PMHx:   Past Medical History:   Diagnosis Date    Anxiety     Breast Cancer - right breast ([facility] [address]     Dr. Marta Weston: pN0 January 2021: ER/NC+, HER2 Neg :  Aromatase Inhibitor tx (will need DEXA)    Hypercholesteremia     Obstructive sleep apnea (adult) (pediatric)     APAP 10-18 (ave 12 cwp)    PONV (postoperative nausea and vomiting)     Restless leg      PSHx:   Past Surgical History:   Procedure Laterality Date    ACHILLES TENDON SURGERY Right     BREAST BIOPSY Right 01/26/2021    RIGHT SENTINEL LYMPH NODE BIOPSY performed by Abelino Douglass DO at [address] [place] [address]  10/29/2020    BREAST REDUCTION SURGERY Bilateral 10/29/2020    BILATERAL BREAST REDUCTION AND SPY performed by Kiana Tellez MD at [address] [place] [address] COLONOSCOPY N/A 06/11/2020    COLORECTAL CANCER SCREENING, HIGH RISK performed by Eddie Mcintosh MD at [facility]  2002    DUB:  Dr. Domenica Butts still in   [place] [address]     Allergy: No Known Allergies    SHx:   Social History     Socioeconomic History    Marital status: Single     Spouse name: Carline Espinosa ([phone] POA)     Number of children: 0    Years of education: Not on file    Highest education level: Not on file   Occupational History    Occupation: [employer]   Social Needs    Financial resource strain: Not on file    Food insecurity     Worry: Not on file     Inability: Not on file   [employer] needs     Medical: Not on file     Non-medical: Not on file   Tobacco Use    Smoking status: Former Smoker Packs/day: 0.25     Years: 22.00     Pack years: 5.50     Types: Cigarettes     Quit date: 2018     Years since quittin.6    Smokeless tobacco: Never Used    Tobacco comment: contemplative, in process of quitting , discussed with patient   Substance and Sexual Activity    Alcohol use: Yes     Comment: occasionally    Drug use: No    Sexual activity: Yes     Partners: Female   Lifestyle    Physical activity     Days per week: Not on file     Minutes per session: Not on file    Stress: Not on file   Relationships    Social connections     Talks on phone: Not on file     Gets together: Not on file     Attends Holiness service: Not on file     Active member of club or organization: Not on file     Attends meetings of clubs or organizations: Not on file     Relationship status: Not on file    Intimate partner violence     Fear of current or ex partner: Not on file     Emotionally abused: Not on file     Physically abused: Not on file     Forced sexual activity: Not on file   Other Topics Concern    Not on file   Social History Narrative    Not on file     FHx: Family history reviewed and is noncontributory     Meds:   Current Outpatient Medications   Medication Sig Dispense Refill    ondansetron (ZOFRAN-ODT) 4 MG disintegrating tablet Take 1 tablet by mouth 3 times daily as needed for Nausea or Vomiting 8 tablet 0    pramipexole (MIRAPEX) 0.25 MG tablet Take 1 tablet by mouth nightly 90 tablet 3    carbidopa-levodopa (SINEMET)  MG per tablet TAKE 1 TABLET BY MOUTH ONE TIME A DAY 90 tablet 3    simvastatin (ZOCOR) 40 MG tablet TAKE 1 TABLET BY MOUTH NIGHTLY 90 tablet 3    venlafaxine (EFFEXOR XR) 75 MG extended release capsule TAKE 1 CAPSULE BY MOUTH ONE TIME A DAY 90 capsule 3    Multiple Vitamins-Minerals (MULTIVITAL PO) Take by mouth      cetirizine (ZYRTEC) 10 MG tablet Take 10 mg by mouth daily       No current facility-administered medications for this visit.         ROS Constitutional: Negative for chills and fever. HENT: Negative for congestion, facial swelling, and voice change. Eyes: Negative for photophobia and visual disturbance. Respiratory: Negative for apnea, cough, chest tightness and shortness of breath. Cardiovascular: Negative for chest pain and palpitations. Gastrointestinal: Negative for dysphagia and early satiety. Genitourinary: Negative for difficulty urinating, dysuria, flank pain, frequency and hematuria. Musculoskeletal: Negative for new gait problem, joint swelling and myalgias. Skin: Negative for color change, pallor and rash. Endocrine: negative for tremors, temperature intolerance or polydipsia. Allergic/Immunologic: Negative for new environmental or food allergies. Neurological: Negative for dizziness, seizures, speech difficulty, numbness. Hematological: Negative for adenopathy. Psychiatric/Behavioral: Negative for agitation and confusion. EXAM    /78   Pulse 88   Temp 97.2 °F (36.2 °C) (Temporal)   Wt 191 lb 9.6 oz (86.9 kg)   LMP  (LMP Unknown)   SpO2 98%   BMI 30.01 kg/m²     GEN: NAD, pleasant, obese  CVS: RRR  PULM: No respiratory distress  HEENT: PERRLA/EOMI; hearing appears within normal limits  NECK: Supple with trachea in midline, no masses  FACE:Wearing mask  EXT: No lymphedema  BREAST: Incisions well healed  Good contour  Radiation markings/changes noted  ABD: soft/NT/ND  NEURO: No focal deficits, no obvious CN deficits    PATHOLOGY: Reviewed with patient    IMP: 64 y. o.female s/p BBR with CA in specimen  PLAN: Doing well overall. Will follow-up in 1 year.      Connie Jacob MD  [address] [place] [address] [address] Reconstructive Surgery  [phone]  03/03/21

## 2021-03-04 ENCOUNTER — PATIENT MESSAGE (OUTPATIENT)
Dept: PRIMARY CARE CLINIC | Age: 56
End: 2021-03-04

## 2021-03-04 DIAGNOSIS — G25.81 RESTLESS LEG: ICD-10-CM

## 2021-03-04 NOTE — TELEPHONE ENCOUNTER
From: Esau Yao  To: Sahra Graham MD  Sent: 3/4/2021 8:48 AM EST  Subject: Non-Urgent Eliecer Guardian Dr. Beryle Spates,    I have 8 more radiation treatments and then I start on the hormone therapy. All is good in that area. However, I've started to have more sleepless nights due to my legs being very restless. I still take the Sinemet and Mirapex at night, but they don't seem to be helping as much as when I first started taking them. I've been reading a lot about CBD oil, cannabis or other homeopathic treatments. I really need something because my quality of life isn't good right now. I was up all night. Any suggestions?     Osborn Bernheim

## 2021-03-17 ENCOUNTER — PATIENT MESSAGE (OUTPATIENT)
Dept: PRIMARY CARE CLINIC | Age: 56
End: 2021-03-17

## 2021-03-17 DIAGNOSIS — G25.81 RESTLESS LEG: ICD-10-CM

## 2021-03-17 RX ORDER — PRAMIPEXOLE DIHYDROCHLORIDE 0.5 MG/1
0.5 TABLET ORAL NIGHTLY
Qty: 90 TABLET | Refills: 3 | Status: SHIPPED | OUTPATIENT
Start: 2021-03-17 | End: 2021-09-27

## 2021-03-17 NOTE — TELEPHONE ENCOUNTER
From: Caleb Morin  To: Ivy Wolfe MD  Sent: 3/17/2021 10:11 AM EDT  Subject: Non-Urgent Medical Question    Hello,    I've been taking two Pramipexole tabs at night and it has helped reduce the restless in my legs. Could you make that change with [address]? Also, do I need a pap smear since I've had a partial hysterectomy? I haven't had one in a few years and would like to cover all my bases, so to speak :). Thanks for being so available to help.     Elise

## 2021-06-21 RX ORDER — VENLAFAXINE HYDROCHLORIDE 75 MG/1
CAPSULE, EXTENDED RELEASE ORAL
Qty: 90 CAPSULE | Refills: 3 | Status: SHIPPED | OUTPATIENT
Start: 2021-06-21 | End: 2021-09-27 | Stop reason: SDUPTHER

## 2021-07-06 DIAGNOSIS — E78.00 HYPERCHOLESTEREMIA: ICD-10-CM

## 2021-07-06 NOTE — TELEPHONE ENCOUNTER
Medication:   Requested Prescriptions     Pending Prescriptions Disp Refills    simvastatin (ZOCOR) 40 MG tablet [Pharmacy Med Name: SIMVASTATIN 40MG TABS] 90 tablet 3     Sig: TAKE ONE TABLET BY MOUTH NIGHTLY        Last Filled:  09/14/2021 with 3 refills     Patient Phone Number: [phone] (home)     Last appt: 1/21/2021 Return in about 6 months (around 7/21/2021). Next appt: 9/9/2021    Last OARRS: No flowsheet data found.

## 2021-07-08 RX ORDER — SIMVASTATIN 40 MG
TABLET ORAL
Qty: 90 TABLET | Refills: 3 | Status: SHIPPED | OUTPATIENT
Start: 2021-07-08 | End: 2021-09-27

## 2021-09-26 DIAGNOSIS — E78.00 HYPERCHOLESTEREMIA: ICD-10-CM

## 2021-09-27 ENCOUNTER — OFFICE VISIT (OUTPATIENT)
Dept: PRIMARY CARE CLINIC | Age: 56
End: 2021-09-27
Payer: COMMERCIAL

## 2021-09-27 VITALS
DIASTOLIC BLOOD PRESSURE: 70 MMHG | HEIGHT: 67 IN | WEIGHT: 190.2 LBS | SYSTOLIC BLOOD PRESSURE: 113 MMHG | HEART RATE: 94 BPM | BODY MASS INDEX: 29.85 KG/M2

## 2021-09-27 DIAGNOSIS — G25.81 RESTLESS LEG: ICD-10-CM

## 2021-09-27 DIAGNOSIS — E78.00 HYPERCHOLESTEREMIA: Primary | ICD-10-CM

## 2021-09-27 DIAGNOSIS — G47.33 OBSTRUCTIVE SLEEP APNEA: ICD-10-CM

## 2021-09-27 DIAGNOSIS — F33.41 RECURRENT MAJOR DEPRESSIVE DISORDER, IN PARTIAL REMISSION (HCC): ICD-10-CM

## 2021-09-27 PROCEDURE — 96127 BRIEF EMOTIONAL/BEHAV ASSMT: CPT | Performed by: FAMILY MEDICINE

## 2021-09-27 PROCEDURE — 99214 OFFICE O/P EST MOD 30 MIN: CPT | Performed by: FAMILY MEDICINE

## 2021-09-27 RX ORDER — CARBIDOPA/LEVODOPA 25MG-250MG
TABLET ORAL
Qty: 90 TABLET | Refills: 3 | Status: SHIPPED | OUTPATIENT
Start: 2021-09-27 | End: 2022-07-15

## 2021-09-27 RX ORDER — SIMVASTATIN 40 MG
TABLET ORAL
Qty: 90 TABLET | Refills: 3 | Status: SHIPPED | OUTPATIENT
Start: 2021-09-27 | End: 2022-07-15

## 2021-09-27 RX ORDER — PRAMIPEXOLE DIHYDROCHLORIDE 0.5 MG/1
0.5 TABLET ORAL 2 TIMES DAILY
Qty: 180 TABLET | Refills: 3 | Status: SHIPPED | OUTPATIENT
Start: 2021-09-27 | End: 2022-04-04

## 2021-09-27 RX ORDER — VENLAFAXINE HYDROCHLORIDE 75 MG/1
CAPSULE, EXTENDED RELEASE ORAL
Qty: 90 CAPSULE | Refills: 3 | Status: SHIPPED | OUTPATIENT
Start: 2021-09-27 | End: 2022-06-13

## 2021-09-27 ASSESSMENT — PATIENT HEALTH QUESTIONNAIRE - PHQ9
SUM OF ALL RESPONSES TO PHQ QUESTIONS 1-9: 5
3. TROUBLE FALLING OR STAYING ASLEEP: 1
4. FEELING TIRED OR HAVING LITTLE ENERGY: 1
1. LITTLE INTEREST OR PLEASURE IN DOING THINGS: 0
10. IF YOU CHECKED OFF ANY PROBLEMS, HOW DIFFICULT HAVE THESE PROBLEMS MADE IT FOR YOU TO DO YOUR WORK, TAKE CARE OF THINGS AT HOME, OR GET ALONG WITH OTHER PEOPLE: 1
SUM OF ALL RESPONSES TO PHQ9 QUESTIONS 1 & 2: 1
6. FEELING BAD ABOUT YOURSELF - OR THAT YOU ARE A FAILURE OR HAVE LET YOURSELF OR YOUR FAMILY DOWN: 0
5. POOR APPETITE OR OVEREATING: 1
7. TROUBLE CONCENTRATING ON THINGS, SUCH AS READING THE NEWSPAPER OR WATCHING TELEVISION: 1
SUM OF ALL RESPONSES TO PHQ QUESTIONS 1-9: 5
2. FEELING DOWN, DEPRESSED OR HOPELESS: 1
SUM OF ALL RESPONSES TO PHQ QUESTIONS 1-9: 5
8. MOVING OR SPEAKING SO SLOWLY THAT OTHER PEOPLE COULD HAVE NOTICED. OR THE OPPOSITE, BEING SO FIGETY OR RESTLESS THAT YOU HAVE BEEN MOVING AROUND A LOT MORE THAN USUAL: 0
9. THOUGHTS THAT YOU WOULD BE BETTER OFF DEAD, OR OF HURTING YOURSELF: 0

## 2021-09-27 NOTE — PATIENT INSTRUCTIONS
of augmentation with dopamine agonists. Doses are provided in the table (table 2). A dopamine agonist is a reasonable first-line alternative in patients at increased risk for side effects from an alpha-2-delta calcium channel ligand, such as those with obesity and its complications, a past or present history of moderate or severe depression, disorders causing gait instability or respiratory failure, and a history of substance use disorder (algorithm 1). (See 'Choice of therapy' above.)  If the first drug chosen is ineffective or poorly tolerated, then a drug of the other class should be tried. ? Options for intermittent symptoms - In patients with intermittent symptoms that are not frequent enough to require daily therapy but are nonetheless disabling when they do occur, we suggest a trial of a dopamine agonist or levodopa on an as-needed basis (Grade 2C). Other treatment options for intermittent symptoms include benzodiazepines and opioids. (See 'Intermittent symptoms' above.)  ? Options for refractory RLS - When RLS symptoms are refractory to first-line therapy with an alpha-2-delta calcium channel ligand or a dopamine agonist, treatment options include combination therapy and opioids. (See 'Refractory RLS' above.)  ? Management of augmentation - Augmentation, which refers to an overall worsening of RLS symptom severity, with earlier onset of symptoms, shorter symptom latency with rest, shorter duration of action of drugs, or spread of symptoms to trunk or arms, is the most common complication of long-term dopaminergic therapy (table 3 and table 4). Augmentation may be managed by alteration in the treatment regimen (algorithm 2).  (See 'Augmentation' above.)

## 2021-09-27 NOTE — TELEPHONE ENCOUNTER
This writer call the pt to schedule her a 4 month appointment out and she stated that she is coming on for her appointment today. She because upset and stated that she don't understand why she is being schedule for all theses appointment. I explain to her per Dr. VILLA [facility] if she would like further refills then she needs to make the appointment. She stated that she will do it when she comes in.

## 2021-09-27 NOTE — PROGRESS NOTES
Chief Complaint   Patient presents with    Annual Exam       HPI: Regnialdo Pulliam presents for evaluation and management of cholesterol, restless leg syndrome, sleep apnea and depression. Reginaldo Pulliam notes that she is taking tolerating her cholesterol medicine without abdominal pain or myalgia. She notes that she is compliant with her CPAP machine for his sleep apnea and really likes it. She notes no morning headaches and her energy is better. She reports that she continues with worsening of her restless leg syndrome. She thinks that Mirapex is helping some and would like to increase the dosage if possible. She notes she has restless legs throughout the day and would like to have that addressed as well. She reports she is more irritable lately. She had been diagnosed with breast cancer earlier this year on biopsy of her bilateral mastectomy and has undergone treatment for this tells me that she is finished with her therapy at this time.     Today's PHQ:    PHQ Scores 9/27/2021 1/21/2021 10/2/2020 4/9/2018   PHQ2 Score 1 0 0 0   PHQ9 Score 5 7 5 0     Interpretation of Total Score Depression Severity: 1-4 = Minimal depression, 5-9 = Mild depression, 10-14 = Moderate depression, 15-19 = Moderately severe depression, 20-27 = Severe depression        Review of Systems    No Known Allergies  New Prescriptions    No medications on file     Current Outpatient Medications   Medication Sig Dispense Refill    simvastatin (ZOCOR) 40 MG tablet TAKE ONE TABLET BY MOUTH NIGHTLY 90 tablet 3    carbidopa-levodopa (SINEMET)  MG per tablet TAKE 1 TABLET BY MOUTH ONE TIME A DAY 90 tablet 3    pramipexole (MIRAPEX) 0.5 MG tablet Take 1 tablet by mouth 2 times daily 180 tablet 3    venlafaxine (EFFEXOR XR) 75 MG extended release capsule TAKE 1 CAPSULE BY MOUTH ONE TIME A DAY 90 capsule 3    Multiple Vitamins-Minerals (MULTIVITAL PO) Take by mouth      cetirizine (ZYRTEC) 10 MG tablet Take 10 mg by mouth daily      ondansetron (ZOFRAN-ODT) 4 MG disintegrating tablet Take 1 tablet by mouth 3 times daily as needed for Nausea or Vomiting (Patient not taking: Reported on 9/27/2021) 8 tablet 0     No current facility-administered medications for this visit. Past Medical History:   Diagnosis Date    Anxiety     Breast Cancer - right breast ([facility] [address]     Dr. Javier Joaquin: pN0 January 2021: ER/MD+, HER2 Neg :  Aromatase Inhibitor tx (will need DEXA)    Hypercholesteremia     Obstructive sleep apnea (adult) (pediatric)     APAP 10-18 (ave 12 cwp)    PONV (postoperative nausea and vomiting)     Restless leg          Objective   /70   Pulse 94   Ht 5' 7\" (1.702 m)   Wt 190 lb 3.2 oz (86.3 kg)   LMP  (LMP Unknown)   BMI 29.79 kg/m²   Wt Readings from Last 3 Encounters:   09/27/21 190 lb 3.2 oz (86.3 kg)   03/03/21 191 lb 9.6 oz (86.9 kg)   01/26/21 185 lb (83.9 kg)       Physical Exam  Constitutional:       Appearance: She is well-developed. Cardiovascular:      Rate and Rhythm: Normal rate and regular rhythm. Pulses:           Dorsalis pedis pulses are 2+ on the right side and 2+ on the left side. Posterior tibial pulses are 2+ on the right side and 2+ on the left side. Heart sounds: No murmur heard. No friction rub. No gallop. Comments: No Lower Extremity Edema  Pulmonary:      Effort: Pulmonary effort is normal.      Breath sounds: Normal breath sounds. No wheezing or rales. Abdominal:      General: Bowel sounds are normal. There is no distension. Palpations: Abdomen is soft. There is no mass. Tenderness: There is no abdominal tenderness. Skin:     General: Skin is warm and dry. Findings: No rash.            Chemistry        Component Value Date/Time     01/21/2021 1536    K 4.8 01/21/2021 1536     01/21/2021 1536    CO2 29 01/21/2021 1536    BUN 14 01/21/2021 1536    CREATININE 1.1 01/21/2021 1536        Component Value Date/Time    CALCIUM 9.8 01/21/2021 1536 ALKPHOS 95 01/21/2021 1536    AST 23 01/21/2021 1536    ALT 21 01/21/2021 1536    BILITOT 0.3 01/21/2021 1536          Lab Results   Component Value Date    WBC 5.9 01/21/2021    HGB 14.4 01/21/2021    HCT 42.6 01/21/2021    MCV 92.3 01/21/2021     01/21/2021     No results found for: LABA1C  No results found for: EAG  No results found for: LABA1C  No components found for: CHLPL  Lab Results   Component Value Date    TRIG 164 (H) 10/02/2020    TRIG 130 07/23/2019    TRIG 164 05/23/2019     Lab Results   Component Value Date    HDL 46 10/02/2020    HDL 59 07/23/2019    HDL 45 05/23/2019     Lab Results   Component Value Date    LDLCALC 150 (H) 10/02/2020    LDLCALC 140 (H) 07/23/2019    LDLCALC 111 05/23/2019     Lab Results   Component Value Date    LABVLDL 33 10/02/2020    LABVLDL 26 07/23/2019    LABVLDL 40 04/09/2018         Assessment   Plan   1. Hypercholesteremia  Controlled: Appears stable. We will continue current management and monitor for adverse reaction and disease progression. Follow-up as noted below    - Lipid Panel; Future  - Comprehensive Metabolic Panel; Future    2. Restless leg  Uncontrolled: We will titrate up her Mirapex today. Continue Sinemet. We will check her iron stores and gave her information on other things she can do to address her restless leg syndrome.      - Iron and TIBC; Future  - carbidopa-levodopa (SINEMET)  MG per tablet; TAKE 1 TABLET BY MOUTH ONE TIME A DAY  Dispense: 90 tablet; Refill: 3  - pramipexole (MIRAPEX) 0.5 MG tablet; Take 1 tablet by mouth 2 times daily  Dispense: 180 tablet; Refill: 3    3. Obstructive sleep apnea  Treated: Continue CPAP and follow    4. Recurrent major depressive disorder, in partial remission (HCC)  Controlled: Appears stable. We will continue current management and monitor for adverse reaction and disease progression.   Follow-up as noted below    - venlafaxine (EFFEXOR XR) 75 MG extended release capsule; TAKE 1 CAPSULE BY MOUTH ONE TIME A DAY  Dispense: 90 capsule; Refill: 3  - TX BEHAV ASSMT W/SCORE & DOCD/STAND INSTRUMENT  Discussed use, benefit, and side effects of prescribed medications. Barriers to medication compliance addressed. All patient questions answered. Pt voiced understanding. RTC Return in about 6 months (around 3/27/2022).

## 2021-10-20 ENCOUNTER — OFFICE VISIT (OUTPATIENT)
Dept: SURGERY | Age: 56
End: 2021-10-20
Payer: COMMERCIAL

## 2021-10-20 VITALS
SYSTOLIC BLOOD PRESSURE: 126 MMHG | WEIGHT: 192 LBS | OXYGEN SATURATION: 98 % | BODY MASS INDEX: 30.13 KG/M2 | DIASTOLIC BLOOD PRESSURE: 85 MMHG | HEART RATE: 96 BPM | HEIGHT: 67 IN

## 2021-10-20 DIAGNOSIS — G25.81 RESTLESS LEG: ICD-10-CM

## 2021-10-20 DIAGNOSIS — E78.00 HYPERCHOLESTEREMIA: ICD-10-CM

## 2021-10-20 DIAGNOSIS — Z09 POSTOP CHECK: Primary | ICD-10-CM

## 2021-10-20 LAB
A/G RATIO: 1.7 (ref 1.1–2.2)
ALBUMIN SERPL-MCNC: 4.3 G/DL (ref 3.4–5)
ALP BLD-CCNC: 97 U/L (ref 40–129)
ALT SERPL-CCNC: 26 U/L (ref 10–40)
ANION GAP SERPL CALCULATED.3IONS-SCNC: 13 MMOL/L (ref 3–16)
AST SERPL-CCNC: 28 U/L (ref 15–37)
BILIRUB SERPL-MCNC: <0.2 MG/DL (ref 0–1)
BUN BLDV-MCNC: 10 MG/DL (ref 7–20)
CALCIUM SERPL-MCNC: 9.7 MG/DL (ref 8.3–10.6)
CHLORIDE BLD-SCNC: 102 MMOL/L (ref 99–110)
CHOLESTEROL, TOTAL: 220 MG/DL (ref 0–199)
CO2: 24 MMOL/L (ref 21–32)
CREAT SERPL-MCNC: 1.1 MG/DL (ref 0.6–1.1)
GFR AFRICAN AMERICAN: >60
GFR NON-AFRICAN AMERICAN: 51
GLOBULIN: 2.5 G/DL
GLUCOSE BLD-MCNC: 89 MG/DL (ref 70–99)
HDLC SERPL-MCNC: 52 MG/DL (ref 40–60)
IRON SATURATION: 29 % (ref 15–50)
IRON: 73 UG/DL (ref 37–145)
LDL CHOLESTEROL CALCULATED: 129 MG/DL
POTASSIUM SERPL-SCNC: 4.9 MMOL/L (ref 3.5–5.1)
SODIUM BLD-SCNC: 139 MMOL/L (ref 136–145)
TOTAL IRON BINDING CAPACITY: 254 UG/DL (ref 260–445)
TOTAL PROTEIN: 6.8 G/DL (ref 6.4–8.2)
TRIGL SERPL-MCNC: 196 MG/DL (ref 0–150)
VLDLC SERPL CALC-MCNC: 39 MG/DL

## 2021-10-20 PROCEDURE — 99213 OFFICE O/P EST LOW 20 MIN: CPT | Performed by: SURGERY

## 2021-10-20 NOTE — PROGRESS NOTES
[facility]    PROCEDURE: BBR  DATE: 10/29/20    Duffy Heimlich has been recovering well since her procedure. Pain has been well controlled without pain medications. Since her last evaluation, she underwent a sentinel lymph node biopsy with Dr. Asiya Monet for staging. Her nodes were negative for malignancy and she is working with radiation oncology. Since her last evaluation, she notes that her right breast has decreased in volume secondary to her radiation. She has had some tightness, but otherwise has been doing well.     PMHx:   Past Medical History:   Diagnosis Date    Anxiety     Breast Cancer - right breast ([facility] [address]     Dr. Zepeda Check: pN0 January 2021: ER/IN+, HER2 Neg :  Aromatase Inhibitor tx (will need DEXA)    Hypercholesteremia     Obstructive sleep apnea (adult) (pediatric)     APAP 10-18 (ave 12 cwp)    PONV (postoperative nausea and vomiting)     Restless leg      PSHx:   Past Surgical History:   Procedure Laterality Date    ACHILLES TENDON SURGERY Right     BREAST BIOPSY Right 01/26/2021    RIGHT SENTINEL LYMPH NODE BIOPSY performed by Ezequiel Minor DO at [address] [place] [address]  10/29/2020    BREAST REDUCTION SURGERY Bilateral 10/29/2020    BILATERAL BREAST REDUCTION AND SPY performed by Hayley Roman MD at [address] [facility] COLONOSCOPY N/A 06/11/2020    COLORECTAL CANCER SCREENING, HIGH RISK performed by Blake Hester MD at [address] [facility]  2002    DUB:  Dr. Tracey Huston still in   [place] [address]     Allergy: No Known Allergies    SHx:   Social History     Socioeconomic History    Marital status: Single     Spouse name: Pily Emery ([phone] POA)     Number of children: 0    Years of education: Not on file    Highest education level: Not on file   Occupational History    Occupation: [employer]   Tobacco Use    Smoking status: Former Smoker     Packs/day: 0.25     Years: 22.00     Pack years: 5.50     Types: Cigarettes     Quit date: 7/1/2018     Years since quitting: 3.3    Smokeless tobacco: Never Used    Tobacco comment: contemplative, in process of quitting 5/18, discussed with patient   Vaping Use    Vaping Use: Never used   Substance and Sexual Activity    Alcohol use: Yes     Comment: occasionally    Drug use: No    Sexual activity: Yes     Partners: Female   Other Topics Concern    Not on file   Social History Narrative    Not on file     Social Determinants of Health     Financial Resource Strain:     Difficulty of Paying Living Expenses:    Food Insecurity:     Worried About Running Out of Food in the Last Year:     [address] in the Last Year:    Transportation Needs:     Lack of Transportation (Medical):      Lack of Transportation (Non-Medical):    Physical Activity:     Days of Exercise per Week:     Minutes of Exercise per Session:    Stress:     Feeling of Stress :    Social Connections:     Frequency of Communication with Friends and Family:     Frequency of Social Gatherings with Friends and Family:     Attends Congregation Services:     Active Member of Clubs or Organizations:     Attends Club or Organization Meetings:     Marital Status:    Intimate Partner Violence:     Fear of Current or Ex-Partner:     Emotionally Abused:     Physically Abused:     Sexually Abused:      FHx: Family history reviewed and is noncontributory     Meds:   Current Outpatient Medications   Medication Sig Dispense Refill    simvastatin (ZOCOR) 40 MG tablet TAKE ONE TABLET BY MOUTH NIGHTLY 90 tablet 3    carbidopa-levodopa (SINEMET)  MG per tablet TAKE 1 TABLET BY MOUTH ONE TIME A DAY 90 tablet 3    pramipexole (MIRAPEX) 0.5 MG tablet Take 1 tablet by mouth 2 times daily 180 tablet 3    venlafaxine (EFFEXOR XR) 75 MG extended release capsule TAKE 1 CAPSULE BY MOUTH ONE TIME A DAY 90 capsule 3    ondansetron (ZOFRAN-ODT) 4 MG disintegrating tablet Take 1 tablet by mouth 3 times daily as needed for Nausea or Vomiting (Patient not taking: Reported on 9/27/2021) 8 tablet 0    Multiple Vitamins-Minerals (MULTIVITAL PO) Take by mouth      cetirizine (ZYRTEC) 10 MG tablet Take 10 mg by mouth daily       No current facility-administered medications for this visit. ROS   Constitutional: Negative for chills and fever. HENT: Negative for congestion, facial swelling, and voice change. Eyes: Negative for photophobia and visual disturbance. Respiratory: Negative for apnea, cough, chest tightness and shortness of breath. Cardiovascular: Negative for chest pain and palpitations. Gastrointestinal: Negative for dysphagia and early satiety. Genitourinary: Negative for difficulty urinating, dysuria, flank pain, frequency and hematuria. Musculoskeletal: Negative for new gait problem, joint swelling and myalgias. Skin: Negative for color change, pallor and rash. Endocrine: negative for tremors, temperature intolerance or polydipsia. Allergic/Immunologic: Negative for new environmental or food allergies. Neurological: Negative for dizziness, seizures, speech difficulty, numbness. Hematological: Negative for adenopathy. Psychiatric/Behavioral: Negative for agitation and confusion. EXAM    /85 (Site: Left Upper Arm, Position: Sitting, Cuff Size: Small Adult)   Pulse 96   Ht 5' 7\" (1.702 m)   Wt 192 lb (87.1 kg)   LMP  (LMP Unknown)   SpO2 98%   BMI 30.07 kg/m²     GEN: NAD, pleasant, obese  CVS: RRR  PULM: No respiratory distress  HEENT: PERRLA/EOMI; hearing appears within normal limits  NECK: Supple with trachea in midline, no masses  FACE:Wearing mask  EXT: No lymphedema  BREAST: Incisions well healed  Good contour  Radiation changesnoted  ABD: soft/NT/ND  NEURO: No focal deficits, no obvious CN deficits    PATHOLOGY: Reviewed with patient    IMP: 64 y. o.female s/p BBR with CA in specimen  PLAN: Discussed breast massage as well as Motrin for discomfort secondary to the radiation changes. Will follow-up PRN.     La Parra MD  [address] [place] [address] [facility]  [phone]  10/20/21

## 2021-11-30 ENCOUNTER — OFFICE VISIT (OUTPATIENT)
Dept: GYNECOLOGY | Age: 56
End: 2021-11-30
Payer: COMMERCIAL

## 2021-11-30 VITALS
HEART RATE: 100 BPM | OXYGEN SATURATION: 98 % | WEIGHT: 189 LBS | DIASTOLIC BLOOD PRESSURE: 80 MMHG | RESPIRATION RATE: 17 BRPM | BODY MASS INDEX: 29.66 KG/M2 | HEIGHT: 67 IN | SYSTOLIC BLOOD PRESSURE: 124 MMHG

## 2021-11-30 DIAGNOSIS — Z01.419 WELL WOMAN EXAM WITH ROUTINE GYNECOLOGICAL EXAM: Primary | ICD-10-CM

## 2021-11-30 PROCEDURE — 99386 PREV VISIT NEW AGE 40-64: CPT | Performed by: OBSTETRICS & GYNECOLOGY

## 2021-12-04 ASSESSMENT — ENCOUNTER SYMPTOMS
RESPIRATORY NEGATIVE: 1
EYES NEGATIVE: 1
GASTROINTESTINAL NEGATIVE: 1

## 2021-12-04 NOTE — PROGRESS NOTES
Subjective:      Patient ID: Natasha Rosario is a 64 y.o. female. Patient is here for annual. Patient in menopause. Gynecologic Exam        Review of Systems   Constitutional: Negative. HENT: Negative. Eyes: Negative. Respiratory: Negative. Cardiovascular: Negative. Gastrointestinal: Negative. Genitourinary: Negative. Musculoskeletal: Negative. Skin: Negative. Neurological: Negative. Psychiatric/Behavioral: Negative.       Date of Birth 1965  Past Medical History:   Diagnosis Date    Anxiety     Breast Cancer - right breast ([facility] [address]     Dr. Schroeder Dues: pN0 2021: ER/[place]+, HER2 Neg :  Aromatase Inhibitor tx (will need DEXA)    Hypercholesteremia     Obstructive sleep apnea (adult) (pediatric)     APAP 10-18 (ave 12 cwp)    PONV (postoperative nausea and vomiting)     Restless leg      Past Surgical History:   Procedure Laterality Date    ACHILLES TENDON SURGERY Right     BREAST BIOPSY Right 2021    RIGHT SENTINEL LYMPH NODE BIOPSY performed by Ronald Fontaine DO at [address] [place] [address]  10/29/2020    BREAST REDUCTION SURGERY Bilateral 10/29/2020    BILATERAL BREAST REDUCTION AND SPY performed by Shailesh Vail MD at [address] [facility] COLONOSCOPY N/A 2020    COLORECTAL CANCER SCREENING, HIGH RISK performed by Jaimie Kunz MD at [address] [facility]      DUB:  Dr. Yasmin Burrell still in   [facility]     OB History    Para Term  AB Living   0 0 0 0 0 0   SAB IAB Ectopic Molar Multiple Live Births   0 0 0 0 0 0     Social History     Socioeconomic History    Marital status: Single     Spouse name: Nighat Dial ([phone] POA)     Number of children: 0    Years of education: Not on file    Highest education level: Not on file   Occupational History    Occupation: [employer]   Tobacco Use    Smoking status: Former Smoker     Packs/day: 0.25     Years: 22.00     Pack years: 5.50     Types: Cigarettes     Quit date: 7/1/2018     Years since quitting: 3.4    Smokeless tobacco: Never Used    Tobacco comment: contemplative, in process of quitting 5/18, discussed with patient   Vaping Use    Vaping Use: Never used   Substance and Sexual Activity    Alcohol use: Yes     Comment: occasionally    Drug use: No    Sexual activity: Yes     Partners: Female   Other Topics Concern    Not on file   Social History Narrative    Not on file     Social Determinants of Health     Financial Resource Strain:     Difficulty of Paying Living Expenses: Not on file   Food Insecurity:     Worried About Running Out of Food in the Last Year: Not on file    Elizabeth of Food in the Last Year: Not on file   Transportation Needs:     Lack of Transportation (Medical): Not on file    Lack of Transportation (Non-Medical):  Not on file   Physical Activity:     Days of Exercise per Week: Not on file    Minutes of Exercise per Session: Not on file   Stress:     Feeling of Stress : Not on file   Social Connections:     Frequency of Communication with Friends and Family: Not on file    Frequency of Social Gatherings with Friends and Family: Not on file    Attends Baptism Services: Not on file    Active Member of [address] [place] [address] [employer] or Organizations: Not on file    Attends Club or Organization Meetings: Not on file    Marital Status: Not on file   Intimate Partner Violence:     Fear of Current or Ex-Partner: Not on file    Emotionally Abused: Not on file    Physically Abused: Not on file    Sexually Abused: Not on file   Housing Stability:     Unable to Pay for Housing in the Last Year: Not on file    Number of [place] in the Last Year: Not on file    Unstable Housing in the Last Year: Not on file     No Known Allergies  Outpatient Medications Marked as Taking for the 11/30/21 encounter (Office Visit) with Miroslava Parker MD   Medication Sig Dispense Refill    simvastatin (Hamelalexandria) 40 MG tablet TAKE ONE TABLET BY MOUTH NIGHTLY 90 tablet 3    carbidopa-levodopa (SINEMET)  MG per tablet TAKE 1 TABLET BY MOUTH ONE TIME A DAY 90 tablet 3    pramipexole (MIRAPEX) 0.5 MG tablet Take 1 tablet by mouth 2 times daily 180 tablet 3    venlafaxine (EFFEXOR XR) 75 MG extended release capsule TAKE 1 CAPSULE BY MOUTH ONE TIME A DAY 90 capsule 3    ondansetron (ZOFRAN-ODT) 4 MG disintegrating tablet Take 1 tablet by mouth 3 times daily as needed for Nausea or Vomiting 8 tablet 0    Multiple Vitamins-Minerals (MULTIVITAL PO) Take by mouth      cetirizine (ZYRTEC) 10 MG tablet Take 10 mg by mouth daily       Family History   Problem Relation Age of Onset    Other Mother     Heart Attack Father 46         at 66    Hypertension Father     Other Brother         bipolar     /80 (Site: Right Upper Arm, Position: Sitting, Cuff Size: Large Adult)   Pulse 100   Resp 17   Ht 5' 7\" (1.702 m)   Wt 189 lb (85.7 kg)   LMP  (LMP Unknown)   SpO2 98%   BMI 29.60 kg/m²       Objective:   Physical Exam  Constitutional:       General: She is not in acute distress. Appearance: Normal appearance. She is well-developed and normal weight. She is not diaphoretic. HENT:      Head: Normocephalic. Nose: Nose normal.      Mouth/Throat:      Mouth: Mucous membranes are moist.      Pharynx: Oropharynx is clear. Eyes:      Pupils: Pupils are equal, round, and reactive to light. Neck:      Thyroid: No thyromegaly. Cardiovascular:      Rate and Rhythm: Normal rate and regular rhythm. Heart sounds: Normal heart sounds. No murmur heard. No friction rub. No gallop. Pulmonary:      Effort: Pulmonary effort is normal. No respiratory distress. Breath sounds: Normal breath sounds. No wheezing or rales. Chest:      Chest wall: No tenderness. Breasts:      Right: No swelling, bleeding, inverted nipple, mass, nipple discharge, skin change or tenderness.       Left: No swelling, bleeding, inverted nipple, mass, nipple discharge, skin change or tenderness. Comments: Hx breast reduction  Abdominal:      General: Abdomen is flat. There is no distension. Palpations: Abdomen is soft. There is no hepatomegaly or mass. Tenderness: There is no abdominal tenderness. There is no guarding or rebound. Hernia: No hernia is present. There is no hernia in the left inguinal area. Genitourinary:     Exam position: Lithotomy position. Labia:         Right: No rash, tenderness, lesion or injury. Left: No rash, tenderness, lesion or injury. Urethra: No prolapse, urethral pain, urethral swelling or urethral lesion. Vagina: Normal. No signs of injury and foreign body. No vaginal discharge, erythema, tenderness, bleeding or lesions. Adnexa: Right adnexa normal and left adnexa normal.        Right: No mass, tenderness or fullness. Left: No mass, tenderness or fullness. Rectum: Normal. Guaiac result negative. No mass, tenderness, anal fissure, external hemorrhoid or internal hemorrhoid. Normal anal tone. Comments: Normal urethral meatus, nl urethra, nl bladder. Musculoskeletal:         General: No tenderness. Normal range of motion. Cervical back: Normal range of motion and neck supple. No rigidity. Lymphadenopathy:      Cervical: No cervical adenopathy. Skin:     General: Skin is warm and dry. Neurological:      General: No focal deficit present. Mental Status: She is alert and oriented to person, place, and time. Mental status is at baseline. Deep Tendon Reflexes: Reflexes are normal and symmetric. Psychiatric:         Mood and Affect: Mood normal.         Behavior: Behavior normal.         Thought Content: Thought content normal.         Judgment: Judgment normal.         Assessment:      1. Annual  2. Menopause  3. Hx breast cancer      Plan:      1. Pap, calcium, exercise, mammogram, hemocult negative  2. Stable  3. stable        To Boland MD

## 2021-12-09 ENCOUNTER — HOSPITAL ENCOUNTER (OUTPATIENT)
Dept: MAMMOGRAPHY | Age: 56
Discharge: HOME | End: 2021-12-09
Payer: COMMERCIAL

## 2021-12-09 VITALS — BODY MASS INDEX: 29.66 KG/M2 | WEIGHT: 189 LBS | HEIGHT: 67 IN

## 2021-12-09 DIAGNOSIS — C50.911 MALIGNANT NEOPLASM OF RIGHT FEMALE BREAST, UNSPECIFIED ESTROGEN RECEPTOR STATUS, UNSPECIFIED SITE OF BREAST (HCC): ICD-10-CM

## 2021-12-09 PROCEDURE — G0279 TOMOSYNTHESIS, MAMMO: HCPCS

## 2022-03-07 ENCOUNTER — HOSPITAL ENCOUNTER (OUTPATIENT)
Dept: GENERAL RADIOLOGY | Age: 57
Discharge: HOME | End: 2022-03-07
Payer: COMMERCIAL

## 2022-03-07 ENCOUNTER — PATIENT MESSAGE (OUTPATIENT)
Dept: PRIMARY CARE CLINIC | Age: 57
End: 2022-03-07

## 2022-03-07 ENCOUNTER — HOSPITAL ENCOUNTER (OUTPATIENT)
Age: 57
Discharge: HOME | End: 2022-03-07
Payer: COMMERCIAL

## 2022-03-07 DIAGNOSIS — M25.562 ACUTE PAIN OF LEFT KNEE: ICD-10-CM

## 2022-03-07 DIAGNOSIS — M25.562 ACUTE PAIN OF LEFT KNEE: Primary | ICD-10-CM

## 2022-03-07 PROCEDURE — 73562 X-RAY EXAM OF KNEE 3: CPT

## 2022-03-07 NOTE — TELEPHONE ENCOUNTER
From: Day Ross  To: Dr. Clifton Hanley: 3/7/2022 7:40 AM EST  Subject: Sore Left Knee    Good Morning,  I've had a sore left knee for about 1 1/2 weeks. It's more sore than usual.  Any chance I can get an x-ray to see what is going on? I have an appointment with you next month.     Take Care,  [place]

## 2022-03-18 ENCOUNTER — PATIENT MESSAGE (OUTPATIENT)
Dept: PRIMARY CARE CLINIC | Age: 57
End: 2022-03-18

## 2022-03-21 NOTE — PATIENT INSTRUCTIONS
Patient Education        Knee Arthritis: Exercises  Introduction  Here are some examples of exercises for you to try. The exercises may be suggested for a condition or for rehabilitation. Start each exercise slowly. Ease off the exercises if you start to have pain. You will be told when to start these exercises and which ones will work best for you. How to do the exercises  Knee flexion with heel slide    1. Lie on your back with your knees bent. 2. Slide your heel back by bending your affected knee as far as you can. Then hook your other foot around your ankle to help pull your heel even farther back. 3. Hold for about 6 seconds, then rest for up to 10 seconds. 4. Repeat 8 to 12 times. 5. Switch legs and repeat steps 1 through 4, even if only one knee is sore. Quad sets    1. Sit with your affected leg straight and supported on the floor or a firm bed. Place a small, rolled-up towel under your knee. Your other leg should be bent, with that foot flat on the floor. 2. Tighten the thigh muscles of your affected leg by pressing the back of your knee down into the towel. 3. Hold for about 6 seconds, then rest for up to 10 seconds. 4. Repeat 8 to 12 times. 5. Switch legs and repeat steps 1 through 4, even if only one knee is sore. Straight-leg raises to the front    1. Lie on your back with your good knee bent so that your foot rests flat on the floor. Your affected leg should be straight. Make sure that your low back has a normal curve. You should be able to slip your hand in between the floor and the small of your back, with your palm touching the floor and your back touching the back of your hand. 2. Tighten the thigh muscles in your affected leg by pressing the back of your knee flat down to the floor. Hold your knee straight. 3. Keeping the thigh muscles tight and your leg straight, lift your affected leg up so that your heel is about 12 inches off the floor.  Hold for about 6 seconds, then lower slowly. 4. Relax for up to 10 seconds between repetitions. 5. Repeat 8 to 12 times. 6. Switch legs and repeat steps 1 through 5, even if only one knee is sore. Active knee flexion    1. Lie on your stomach with your knees straight. If your kneecap is uncomfortable, roll up a washcloth and put it under your leg just above your kneecap. 2. Lift the foot of your affected leg by bending the knee so that you bring the foot up toward your buttock. If this motion hurts, try it without bending your knee quite as far. This may help you avoid any painful motion. 3. Slowly move your leg up and down. 4. Repeat 8 to 12 times. 5. Switch legs and repeat steps 1 through 4, even if only one knee is sore. Quadriceps stretch (facedown)    1. Lie flat on your stomach, and rest your face on the floor. 2. Wrap a towel or belt strap around the lower part of your affected leg. Then use the towel or belt strap to slowly pull your heel toward your buttock until you feel a stretch. 3. Hold for about 15 to 30 seconds, then relax your leg against the towel or belt strap. 4. Repeat 2 to 4 times. 5. Switch legs and repeat steps 1 through 4, even if only one knee is sore. Stationary exercise bike    1. If you do not have a stationary exercise bike at home, you can find one to ride at your local health club or community center. 2. Adjust the height of the bike seat so that your knee is slightly bent when your leg is extended downward. If your knee hurts when the pedal reaches the top, you can raise the seat so that your knee does not bend as much. 3. Start slowly. At first, try to do 5 to 10 minutes of cycling with little to no resistance. Then increase your time and the resistance bit by bit until you can do 20 to 30 minutes without pain. 4. If you start to have pain, rest your knee until your pain gets back to the level that is normal for you. Or cycle for less time or with less effort.   Follow-up care is a key part of your treatment and safety. Be sure to make and go to all appointments, and call your doctor if you are having problems. It's also a good idea to know your test results and keep a list of the medicines you take. Where can you learn more? Go to [email or web address].[email or web address] org and sign in to your [employer] account. Enter C159 in the Friend[employer] box to learn more about \"Knee Arthritis: Exercises. \"     If you do not have an account, please click on the \"Sign Up Now\" link. Current as of: July 1, 2021               Content Version: 13.1  © 6517-8640 [employer]wise, Incorporated. Care instructions adapted under license by [facility] ([facility]). If you have questions about a medical condition or this instruction, always ask your healthcare professional. [address] any warranty or liability for your use of this information.

## 2022-04-04 ENCOUNTER — OFFICE VISIT (OUTPATIENT)
Dept: PRIMARY CARE CLINIC | Age: 57
End: 2022-04-04
Payer: COMMERCIAL

## 2022-04-04 VITALS
HEART RATE: 95 BPM | WEIGHT: 196.2 LBS | DIASTOLIC BLOOD PRESSURE: 79 MMHG | BODY MASS INDEX: 30.73 KG/M2 | TEMPERATURE: 97.7 F | SYSTOLIC BLOOD PRESSURE: 112 MMHG

## 2022-04-04 DIAGNOSIS — M54.50 CHRONIC MIDLINE LOW BACK PAIN WITHOUT SCIATICA: ICD-10-CM

## 2022-04-04 DIAGNOSIS — G25.81 RESTLESS LEG: ICD-10-CM

## 2022-04-04 DIAGNOSIS — G47.33 OBSTRUCTIVE SLEEP APNEA: ICD-10-CM

## 2022-04-04 DIAGNOSIS — C50.911 MALIGNANT NEOPLASM OF RIGHT FEMALE BREAST, UNSPECIFIED ESTROGEN RECEPTOR STATUS, UNSPECIFIED SITE OF BREAST (HCC): Primary | ICD-10-CM

## 2022-04-04 DIAGNOSIS — M13.0 POLYARTHRITIS: ICD-10-CM

## 2022-04-04 DIAGNOSIS — F33.41 RECURRENT MAJOR DEPRESSIVE DISORDER, IN PARTIAL REMISSION (HCC): ICD-10-CM

## 2022-04-04 DIAGNOSIS — G89.29 CHRONIC MIDLINE LOW BACK PAIN WITHOUT SCIATICA: ICD-10-CM

## 2022-04-04 PROCEDURE — 99214 OFFICE O/P EST MOD 30 MIN: CPT | Performed by: FAMILY MEDICINE

## 2022-04-04 PROCEDURE — G0444 DEPRESSION SCREEN ANNUAL: HCPCS | Performed by: FAMILY MEDICINE

## 2022-04-04 RX ORDER — PRAMIPEXOLE DIHYDROCHLORIDE 1 MG/1
1 TABLET ORAL NIGHTLY
Qty: 90 TABLET | Refills: 3 | Status: SHIPPED | OUTPATIENT
Start: 2022-04-04 | End: 2022-10-11

## 2022-04-04 RX ORDER — CYCLOBENZAPRINE HCL 10 MG
10 TABLET ORAL 3 TIMES DAILY PRN
Qty: 20 TABLET | Refills: 0 | Status: SHIPPED | OUTPATIENT
Start: 2022-04-04 | End: 2022-04-14

## 2022-04-04 RX ORDER — METHYLPREDNISOLONE 4 MG/1
TABLET ORAL
Qty: 1 KIT | Refills: 0 | Status: SHIPPED | OUTPATIENT
Start: 2022-04-04 | End: 2022-07-21

## 2022-04-04 ASSESSMENT — PATIENT HEALTH QUESTIONNAIRE - PHQ9
2. FEELING DOWN, DEPRESSED OR HOPELESS: 0
8. MOVING OR SPEAKING SO SLOWLY THAT OTHER PEOPLE COULD HAVE NOTICED. OR THE OPPOSITE, BEING SO FIGETY OR RESTLESS THAT YOU HAVE BEEN MOVING AROUND A LOT MORE THAN USUAL: 0
6. FEELING BAD ABOUT YOURSELF - OR THAT YOU ARE A FAILURE OR HAVE LET YOURSELF OR YOUR FAMILY DOWN: 0
10. IF YOU CHECKED OFF ANY PROBLEMS, HOW DIFFICULT HAVE THESE PROBLEMS MADE IT FOR YOU TO DO YOUR WORK, TAKE CARE OF THINGS AT HOME, OR GET ALONG WITH OTHER PEOPLE: 0
3. TROUBLE FALLING OR STAYING ASLEEP: 0
5. POOR APPETITE OR OVEREATING: 0
SUM OF ALL RESPONSES TO PHQ QUESTIONS 1-9: 0
1. LITTLE INTEREST OR PLEASURE IN DOING THINGS: 0
SUM OF ALL RESPONSES TO PHQ QUESTIONS 1-9: 0
SUM OF ALL RESPONSES TO PHQ QUESTIONS 1-9: 0
SUM OF ALL RESPONSES TO PHQ9 QUESTIONS 1 & 2: 0
9. THOUGHTS THAT YOU WOULD BE BETTER OFF DEAD, OR OF HURTING YOURSELF: 0
4. FEELING TIRED OR HAVING LITTLE ENERGY: 0
SUM OF ALL RESPONSES TO PHQ QUESTIONS 1-9: 0
7. TROUBLE CONCENTRATING ON THINGS, SUCH AS READING THE NEWSPAPER OR WATCHING TELEVISION: 0

## 2022-04-04 NOTE — PROGRESS NOTES
Chief Complaint   Patient presents with    Depression       HPI: Berenice Enriquez  presents for evaluation and management of joint pain back pain depression sleep apnea and restless leg syndrome. Amish Bocanegra notes that she has been having bilateral knee pain left worse than right just behind the patella worse with going up and down steps for several months now seems to get better after a couple hours in the morning. She notes she has other diffuse joint pain as well and is concerned about possible underlying connective tissue disease as she has lichen planus and a facial rash intermittently as well. She states she feels okay about her history of breast cancer. She continues to follow with Dr. Jarret Hussein for that. She reports she is using her CPAP and it is working well. Notes her restless leg symptoms are very well controlled with Mirapex and likes the current dosage. She reports her mood is good on Effexor and would like to continue it. Today's PHQ:    PHQ Scores 4/4/2022 9/27/2021 1/21/2021 10/2/2020 4/9/2018   PHQ2 Score 0 1 0 0 0   PHQ9 Score 0 5 7 5 0     Interpretation of Total Score Depression Severity: 1-4 = Minimal depression, 5-9 = Mild depression, 10-14 = Moderate depression, 15-19 = Moderately severe depression, 20-27 = Severe depression        Review of Systems    No Known Allergies  New Prescriptions    CYCLOBENZAPRINE (FLEXERIL) 10 MG TABLET    Take 1 tablet by mouth 3 times daily as needed for Muscle spasms best if taken one hour before bedtime then Ice your lower back for 20 min    METHYLPREDNISOLONE (MEDROL DOSEPACK) 4 MG TABLET    Take by mouth.     PRAMIPEXOLE (MIRAPEX) 1 MG TABLET    Take 1 tablet by mouth nightly     Current Outpatient Medications   Medication Sig Dispense Refill    cyclobenzaprine (FLEXERIL) 10 MG tablet Take 1 tablet by mouth 3 times daily as needed for Muscle spasms best if taken one hour before bedtime then Ice your lower back for 20 min 20 tablet 0    pramipexole (MIRAPEX) 1 MG tablet Take 1 tablet by mouth nightly 90 tablet 3    methylPREDNISolone (MEDROL DOSEPACK) 4 MG tablet Take by mouth. 1 kit 0    simvastatin (ZOCOR) 40 MG tablet TAKE ONE TABLET BY MOUTH NIGHTLY 90 tablet 3    carbidopa-levodopa (SINEMET)  MG per tablet TAKE 1 TABLET BY MOUTH ONE TIME A DAY 90 tablet 3    venlafaxine (EFFEXOR XR) 75 MG extended release capsule TAKE 1 CAPSULE BY MOUTH ONE TIME A DAY 90 capsule 3    Multiple Vitamins-Minerals (MULTIVITAL PO) Take by mouth      cetirizine (ZYRTEC) 10 MG tablet Take 10 mg by mouth daily      ondansetron (ZOFRAN-ODT) 4 MG disintegrating tablet Take 1 tablet by mouth 3 times daily as needed for Nausea or Vomiting (Patient not taking: Reported on 4/4/2022) 8 tablet 0     No current facility-administered medications for this visit. Past Medical History:   Diagnosis Date    Anxiety     [facility] [address]     [facility]     Dr. Odette Red: pN0 January 2021: ER/CA+, HER2 Neg :  Aromatase Inhibitor tx (will need DEXA)    Hypercholesteremia     Obstructive sleep apnea (adult) (pediatric)     APAP 10-18 (ave 12 cwp)    PONV (postoperative nausea and vomiting)     Restless leg          Objective   /79   Pulse 95   Temp 97.7 °F (36.5 °C) (Temporal)   Wt 196 lb 3.2 oz (89 kg)   LMP  (LMP Unknown)   Breastfeeding No   BMI 30.73 kg/m²   Wt Readings from Last 3 Encounters:   04/04/22 196 lb 3.2 oz (89 kg)   12/09/21 189 lb (85.7 kg)   11/30/21 189 lb (85.7 kg)       Physical Exam  Constitutional:       Appearance: She is well-developed. Cardiovascular:      Rate and Rhythm: Normal rate and regular rhythm. Pulses:           Dorsalis pedis pulses are 2+ on the right side and 2+ on the left side. Posterior tibial pulses are 2+ on the right side and 2+ on the left side. Heart sounds: No murmur heard. No friction rub. No gallop.        Comments: No Lower Extremity Edema  Pulmonary:      Effort: Pulmonary effort is normal.      Breath sounds: Normal breath sounds. No wheezing or rales. Abdominal:      General: Bowel sounds are normal. There is no distension. Palpations: Abdomen is soft. There is no mass. Tenderness: There is no abdominal tenderness. Musculoskeletal:      Comments: Boggy joints of metacarpophalangeal and PIPs of hands. No effusion on bilateral knee. Knees are intact to Lachman and [place]. There is a click on patellar grind on each knee. Skin:     General: Skin is warm and dry. Findings: No rash. Chemistry        Component Value Date/Time     10/20/2021 1055    K 4.9 10/20/2021 1055     10/20/2021 1055    CO2 24 10/20/2021 1055    BUN 10 10/20/2021 1055    CREATININE 1.1 10/20/2021 1055        Component Value Date/Time    CALCIUM 9.7 10/20/2021 1055    ALKPHOS 97 10/20/2021 1055    AST 28 10/20/2021 1055    ALT 26 10/20/2021 1055    BILITOT <0.2 10/20/2021 1055          Lab Results   Component Value Date    WBC 5.9 01/21/2021    HGB 14.4 01/21/2021    HCT 42.6 01/21/2021    MCV 92.3 01/21/2021     01/21/2021     No results found for: LABA1C  No results found for: EAG  No results found for: LABA1C  No components found for: CHLPL  Lab Results   Component Value Date    TRIG 196 (H) 10/20/2021    TRIG 164 (H) 10/02/2020    TRIG 130 07/23/2019     Lab Results   Component Value Date    HDL 52 10/20/2021    HDL 46 10/02/2020    HDL 59 07/23/2019     Lab Results   Component Value Date    LDLCALC 129 (H) 10/20/2021    LDLCALC 150 (H) 10/02/2020    LDLCALC 140 (H) 07/23/2019     Lab Results   Component Value Date    LABVLDL 39 10/20/2021    LABVLDL 33 10/02/2020    LABVLDL 26 07/23/2019         Assessment   Plan   1. Malignant neoplasm of right female breast, unspecified estrogen receptor status, unspecified site of breast ([facility] [address]  Continue follow-up with Dr. Shushan Blander.    2. Obstructive sleep apnea  Controlled: Appears stable.   We will continue current management and monitor for adverse reaction and disease progression. Follow-up as noted below      3. Recurrent major depressive disorder, in partial remission (HCC)  Controlled: Appears stable. We will continue current management and monitor for adverse reaction and disease progression. Follow-up as noted below    - RI BEHAV ASSMT W/SCORE & DOCD/STAND INSTRUMENT    4. Restless leg  Improved: Continue meds and follow-up in 6 months  - pramipexole (MIRAPEX) 1 MG tablet; Take 1 tablet by mouth nightly  Dispense: 90 tablet; Refill: 3    5. Polyarthritis  We will check preliminary labs. Suspect patient may have connective tissue disease  - Sedimentation Rate; Future  - C-Reactive Protein; Future  - Cyclic Citrul Peptide Antibody, IgG; Future  - Rheumatoid Factor; Future  - JANESSA Reflex to Antibody Cascade; Future    6. Chronic midline low back pain without sciatica  Refilled Flexeril follow-up in 6 months or as needed  - cyclobenzaprine (FLEXERIL) 10 MG tablet; Take 1 tablet by mouth 3 times daily as needed for Muscle spasms best if taken one hour before bedtime then Ice your lower back for 20 min  Dispense: 20 tablet; Refill: 0  - methylPREDNISolone (MEDROL DOSEPACK) 4 MG tablet; Take by mouth. Dispense: 1 kit; Refill: 0  Discussed use, benefit, and side effects of prescribed medications. Barriers to medication compliance addressed. All patient questions answered. Pt voiced understanding. RTC Return if symptoms worsen or fail to improve.

## 2022-04-04 NOTE — PATIENT INSTRUCTIONS
You will want to call the lab before you go in to see if you can get your labs at any particular site. You should be able to get your labs at one of the following locations:    My office building at [facility] [phone]  Open 7:30-3:30. Or one of these locations if more convenient:  [employer]     [facility], [facility] [address]    M-F 7a-6p;   8a-12p  [phone]    [address]      M-F 9a-7:65p  [address] [facility]    [address] [address]    M-F 7a-5p [phone]    [facility]  [address] [facility], Alda Gallardo[address]  M-F 7a-5p[address]  [phone]

## 2022-04-05 DIAGNOSIS — M13.0 POLYARTHRITIS: ICD-10-CM

## 2022-04-05 LAB
C-REACTIVE PROTEIN: 4.4 MG/L (ref 0–5.1)
RHEUMATOID FACTOR: <10 IU/ML
SEDIMENTATION RATE, ERYTHROCYTE: 23 MM/HR (ref 0–30)

## 2022-04-06 LAB
ANTI-NUCLEAR ANTIBODY (ANA): NEGATIVE
CYCLIC CITRULLINATED PEPTIDE ANTIBODY IGG: <0.5 U/ML (ref 0–2.9)

## 2022-04-18 ENCOUNTER — PATIENT MESSAGE (OUTPATIENT)
Dept: PRIMARY CARE CLINIC | Age: 57
End: 2022-04-18

## 2022-04-18 DIAGNOSIS — M25.562 ACUTE PAIN OF LEFT KNEE: Primary | ICD-10-CM

## 2022-04-18 NOTE — TELEPHONE ENCOUNTER
From: Kaushik Lemus  To: Dr. Dhaliwal Geller: 4/18/2022 7:52 AM EDT  Subject: Severe Left Knee Pain    Good Morning,    As you know I've been having pain in my left knee. The x-ray showed mild arthritis. The pain has persisted and gotten worse. Yesterday I took a short walk and felt something pop. The pain is more significant and I'm thinking there is something wrong besides arthritis. Can I get more detailed radiology or should I see an orthopedic specialist?    I need to see if I should be concerned due to my upcoming trip.     Thanks, [employer]

## 2022-04-19 ENCOUNTER — OFFICE VISIT (OUTPATIENT)
Dept: ORTHOPEDIC SURGERY | Age: 57
End: 2022-04-19
Payer: COMMERCIAL

## 2022-04-19 DIAGNOSIS — M25.562 LEFT KNEE PAIN, UNSPECIFIED CHRONICITY: Primary | ICD-10-CM

## 2022-04-19 PROCEDURE — 99204 OFFICE O/P NEW MOD 45 MIN: CPT | Performed by: ORTHOPAEDIC SURGERY

## 2022-04-19 NOTE — PROGRESS NOTES
Date:  2022    Name:  Susan Fofana  Address:  [place] [address]  [place] [address]    :  1965      Age:   62 y.o.    SSN:  [id number]      Medical Record Number:  [id number]    Reason for Visit:    Chief Complaint    Knee Pain (new patient left knee )      DOS:2022     HPI: Franco Cristobal is a 62 y.o. female here today for for left knee pain. Patient reports left knee pain 2 to 3 days ago. She has sustained a twisting injury to her left knee few days ago. She describes her pain mostly at the lateral aspect of her knee. Her pain is sharp shooting in quality. About 7/10 in severity. Mostly with standing and twisting movements. She denies popping, clicking, or any other mechanical symptoms. Her pain is giving her difficulty in walking. She is using crutches for weightbearing. ROS: All systems reviewed on patient intake form. Pertinent items are noted in HPI.         Past Medical History:   Diagnosis Date    Anxiety     [facility] ([facility]     Breast Cancer - right breast ([facility]     Dr. Dina Peterson: pN0 2021: ER/AK+, HER2 Neg :  Aromatase Inhibitor tx (will need DEXA)    Hypercholesteremia     Obstructive sleep apnea (adult) (pediatric)     APAP 10-18 (ave 12 cwp)    PONV (postoperative nausea and vomiting)     Restless leg         Past Surgical History:   Procedure Laterality Date    ACHILLES TENDON SURGERY Right     BREAST BIOPSY Right 2021    RIGHT SENTINEL LYMPH NODE BIOPSY performed by Arti Mireles DO at [address] [place] [address]  10/29/2020    BREAST REDUCTION SURGERY Bilateral 10/29/2020    BILATERAL BREAST REDUCTION AND SPY performed by Rita Lyman MD at [address] [facility] COLONOSCOPY N/A 2020    COLORECTAL CANCER SCREENING, HIGH RISK performed by Jeb Couch MD at [address] [facility], [facility]      DUB:  Dr. Hutchinson Favorite still in   [place] [address]       Family History   Problem Relation Age of Onset    Other Mother     Heart Attack Father 46         at 66    Hypertension Father     Other Brother         bipolar       Social History     Socioeconomic History    Marital status: Single     Spouse name: Seth Michaud ([phone] POA)     Number of children: 0    Years of education: Not on file    Highest education level: Not on file   Occupational History    Occupation: [employer]   Tobacco Use    Smoking status: Former Smoker     Packs/day: 0.25     Years: 22.00     Pack years: 5.50     Types: Cigarettes     Quit date: 2018     Years since quitting: 3.8    Smokeless tobacco: Never Used    Tobacco comment: contemplative, in process of quitting , discussed with patient   Vaping Use    Vaping Use: Never used   Substance and Sexual Activity    Alcohol use: Yes     Comment: occasionally    Drug use: No    Sexual activity: Yes     Partners: Female   Other Topics Concern    Not on file   Social History Narrative    Not on file     Social Determinants of Health     Financial Resource Strain:     Difficulty of Paying Living Expenses: Not on file   Food Insecurity:     Worried About Running Out of Food in the Last Year: Not on file    Elizabeth of Food in the Last Year: Not on file   Transportation Needs:     Lack of Transportation (Medical): Not on file    Lack of Transportation (Non-Medical):  Not on file   Physical Activity:     Days of Exercise per Week: Not on file    Minutes of Exercise per Session: Not on file   Stress:     Feeling of Stress : Not on file   Social Connections:     Frequency of Communication with Friends and Family: Not on file    Frequency of Social Gatherings with Friends and Family: Not on file    Attends Sikhism Services: Not on file    Active Member of Clubs or Organizations: Not on file    Attends Club or Organization Meetings: Not on file    Marital Status: Not on file   Intimate Partner Violence:     Fear of Current or Ex-Partner: Not on file    Emotionally Abused: Not on file    Physically Abused: Not on file    Sexually Abused: Not on file   Housing Stability:     Unable to Pay for Housing in the Last Year: Not on file    Number of Places Lived in the Last Year: Not on file    Unstable Housing in the Last Year: Not on file       Current Outpatient Medications   Medication Sig Dispense Refill    pramipexole (MIRAPEX) 1 MG tablet Take 1 tablet by mouth nightly 90 tablet 3    methylPREDNISolone (MEDROL DOSEPACK) 4 MG tablet Take by mouth. 1 kit 0    simvastatin (ZOCOR) 40 MG tablet TAKE ONE TABLET BY MOUTH NIGHTLY 90 tablet 3    carbidopa-levodopa (SINEMET)  MG per tablet TAKE 1 TABLET BY MOUTH ONE TIME A DAY 90 tablet 3    venlafaxine (EFFEXOR XR) 75 MG extended release capsule TAKE 1 CAPSULE BY MOUTH ONE TIME A DAY 90 capsule 3    ondansetron (ZOFRAN-ODT) 4 MG disintegrating tablet Take 1 tablet by mouth 3 times daily as needed for Nausea or Vomiting (Patient not taking: Reported on 4/4/2022) 8 tablet 0    Multiple Vitamins-Minerals (MULTIVITAL PO) Take by mouth      cetirizine (ZYRTEC) 10 MG tablet Take 10 mg by mouth daily       No current facility-administered medications for this visit. No Known Allergies    Vital signs:  LMP  (LMP Unknown)            Left knee exam    Gait: Antalgic gait. Using of crutches. Alignment: normal alignment. Inspection/skin: Skin is intact without erythema or ecchymosis. No gross deformity. Palpation: no crepitus. There is tenderness over the lateral joint line. There is no tenderness over the medial joint line. There is tenderness over the insertion of the biceps femoris tendon  Range of Motion: There is full range of motion. Strength: Normal quadriceps development. Effusion: No effusion or swelling present. Ligamentous stability: No cruciate or collateral ligament instability. Neurologic and vascular: Skin is warm and well-perfused. Sensation is intact to light-touch. Special tests: Negative Francisco sign. Right knee exam    Gait: No use of assistive devices. No antalgic gait. Alignment: normal alignment. Inspection/skin: Skin is intact without erythema or ecchymosis. No gross deformity. Palpation: no crepitus. no joint line tenderness present. Range of Motion: There is full range of motion. Strength: Normal quadriceps development. Effusion: No effusion or swelling present. Ligamentous stability: No cruciate or collateral ligament instability. Neurologic and vascular: Skin is warm and well-perfused. Sensation is intact to light-touch. Special tests: Negative Francisco sign. Diagnostics:  Radiology:       Radiographs were obtained and reviewed in the office; 4 views: bilateral PA, bilateral Thompson, bilateral Merchants AND left lateral    Impression: No evidence of fracture or dislocation. No signs of osteoarthritis. Assessment: 62years old female patient with left knee pain outpatient diagnosis is lateral meniscus tear versus biceps femoris tendinitis    Plan: Pertinent imaging was reviewed. The etiology, natural history, and treatment options for the disorder were discussed. The roles of activity medication, antiinflammatories, injections, bracing, physical therapy, and surgical interventions were all described to the patient and questions were answered. Patient has left knee pain. Her pain mostly at the lateral joint line and at the insertion of the biceps femoris tendon. Clinical examination shows point tenderness at the lateral joint line and positive Francisco sign. She also describes her pain as sharp in quality more with turning and twisting movements. Would like to have an MRI to rule out a lateral meniscus tear. However she may have also biceps tendinitis. Would like to see her after her MRI. Ruthie Santana is in agreement with this plan.  All questions were answered to patient's satisfaction and was encouraged to call with any further questions. The patient was advised that NSAID-type medications have several potential side effects that include: gastrointestinal irritation including hemorrhage, renal injury, as well as an increased risk for heart attack and stroke. The patient was asked to take the medication with food and to stop if there is any symptoms of GI upset, including heartburn, nausea, increased gas or diarrhea. I asked the patient to contact their medical provider for vomiting, abdominal pain or black/bloody stools. The patient should have renal function testing per his medical provider periodically if the medication is taken on a regular basis. The patient should be alert for any swelling in the lower extremities and should stop taking the medication immediately and contact their medical provider should this occur. In addition, the patient should stop taking the medication immediately and contact their medical provider should there be any shortness of breath, fatigue and be evaluated in an emergency facility for any chest pain. The patient expresses understanding of these issues and questions were answered. Total time spent for evaluation, education, and development of treatment plan: 49 minutes. 4/19/2022  4:44 PM      Ananya Zavala MD  Clinical Fellow at [address] [place] [address]    During this examination, Jenifer Askew MD functioned as a scribe for Dr. Erendira Guerra. This dictation was performed with a verbal recognition program (DRAGON) and it was checked for errors. It is possible that there are still dictated errors within this office note. If so, please bring any errors to my attention for an addendum. All efforts were made to ensure that this office note is accurate.       Orders Placed This Encounter   Procedures    XR KNEE LEFT (3 VIEWS)     [id number]     Standing Status:   Future     Number of Occurrences:   1     Standing Expiration Date:   4/19/2023     Order Specific Question:   Reason for exam:     Answer:   Pain    XR KNEE RIGHT (3 VIEWS)     Standing Status:   Future     Number of Occurrences:   1     Standing Expiration Date:   4/19/2023     Order Specific Question:   Reason for exam:     Answer:   pain    MRI KNEE LEFT WO CONTRAST     Standing Status:   Future     Standing Expiration Date:   4/19/2023     Order Specific Question:   Reason for exam:     Answer:   MRI LEFT KNEE W/3D EVAL LATERAL MENISCUS TEAR     Order Specific Question:   Reason for exam:     Answer:   BRIAN  PUSH TO [facility] PACS     Order Specific Question:   What is the sedation requirement? Answer:   None   I attest that I met personally with the patient, performed the described exam, reviewed the radiographic studies and medical records associated with this patient and supervised the services that are described above.      Evangelina Gilmore MD

## 2022-04-21 ENCOUNTER — PATIENT MESSAGE (OUTPATIENT)
Dept: PRIMARY CARE CLINIC | Age: 57
End: 2022-04-21

## 2022-04-21 NOTE — TELEPHONE ENCOUNTER
From: James Marin  To: Dr. Mckee Anis: 4/21/2022 10:56 AM EDT  Subject: Recent Blood Work    Good Morning,    Any concerns about my recent blood work? I saw some red flags and wanted to be sure there wasn't cause for concern. Thanks, Elise NORMAN I have an MRI scheduled for my knee on Monday.

## 2022-05-04 ENCOUNTER — OFFICE VISIT (OUTPATIENT)
Dept: ORTHOPEDIC SURGERY | Age: 57
End: 2022-05-04
Payer: COMMERCIAL

## 2022-05-04 VITALS — HEIGHT: 67 IN | WEIGHT: 196 LBS | BODY MASS INDEX: 30.76 KG/M2

## 2022-05-04 DIAGNOSIS — S83.242A TEAR OF MEDIAL MENISCUS OF LEFT KNEE, UNSPECIFIED TEAR TYPE, UNSPECIFIED WHETHER OLD OR CURRENT TEAR, INITIAL ENCOUNTER: Primary | ICD-10-CM

## 2022-05-04 DIAGNOSIS — M17.12 PATELLOFEMORAL ARTHRITIS OF LEFT KNEE: ICD-10-CM

## 2022-05-04 DIAGNOSIS — M66.0 RUPTURED BAKERS CYST: ICD-10-CM

## 2022-05-04 PROCEDURE — 20610 DRAIN/INJ JOINT/BURSA W/O US: CPT | Performed by: ORTHOPAEDIC SURGERY

## 2022-05-04 PROCEDURE — 99214 OFFICE O/P EST MOD 30 MIN: CPT | Performed by: ORTHOPAEDIC SURGERY

## 2022-05-04 RX ORDER — MELOXICAM 15 MG/1
15 TABLET ORAL DAILY
Qty: 30 TABLET | Refills: 3 | Status: CANCELLED | OUTPATIENT
Start: 2022-05-04

## 2022-05-04 RX ORDER — METHYLPREDNISOLONE ACETATE 40 MG/ML
40 INJECTION, SUSPENSION INTRA-ARTICULAR; INTRALESIONAL; INTRAMUSCULAR; SOFT TISSUE ONCE
Status: COMPLETED | OUTPATIENT
Start: 2022-05-04 | End: 2022-05-04

## 2022-05-04 RX ADMIN — METHYLPREDNISOLONE ACETATE 40 MG: 40 INJECTION, SUSPENSION INTRA-ARTICULAR; INTRALESIONAL; INTRAMUSCULAR; SOFT TISSUE at 16:44

## 2022-05-04 NOTE — PROGRESS NOTES
Chief Complaint  Knee Pain (fu/ left knee )      History of Present Illness:  Gene Gallardo is a pleasant 62 y.o. female who presents today for follow up evaluation of left knee pain. She is here to review MRI results. She has sustained a twisting injury to her left knee 2 weeks ago. She describes her pain mostly at the lateral aspect of her knee. Her pain is sharp shooting in quality mostly with standing and twisting movements. She denies popping, clicking, or any other mechanical symptoms. Her pain is giving her difficulty in walking. She is using crutches for weightbearing. Medical History:  Patient's medications, allergies, past medical, surgical, social and family histories were reviewed and updated as appropriate. Pertinent items are noted in HPI  Review of systems reviewed from Patient History Form completed today and available in the patient's chart under the Media tab.          Pain Assessment  Location of Pain: Knee  Location Modifiers: Left  Severity of Pain: 3  Quality of Pain: Sharp,Cracking  Duration of Pain: A few minutes  Frequency of Pain: Intermittent  Aggravating Factors: Walking,Stairs,Standing,Exercise  Limiting Behavior: Yes  Relieving Factors: Rest  Result of Injury: No  Work-Related Injury: No  Are there other pain locations you wish to document?: No    Past Medical History:   Diagnosis Date    Anxiety     [facility] ([facility]     Breast Cancer - right breast ([facility]     Dr. Jarret Hussein: pN0 January 2021: ER/FL+, HER2 Neg :  Aromatase Inhibitor tx (will need DEXA)    Hypercholesteremia     Obstructive sleep apnea (adult) (pediatric)     APAP 10-18 (ave 12 cwp)    PONV (postoperative nausea and vomiting)     Restless leg         Past Surgical History:   Procedure Laterality Date    ACHILLES TENDON SURGERY Right     BREAST BIOPSY Right 01/26/2021    RIGHT SENTINEL LYMPH NODE BIOPSY performed by Vikki Montilla DO at [address] [place] [address]  10/29/2020    BREAST REDUCTION SURGERY Bilateral 10/29/2020    BILATERAL BREAST REDUCTION AND SPY performed by Misti Lewis MD at [address] [facility] COLONOSCOPY N/A 2020    COLORECTAL CANCER SCREENING, HIGH RISK performed by Gina Mata MD at [address] [facility], [facility]      DUB:  Dr. Peter Nunes still in   [facility]       Family History   Problem Relation Age of Onset    Other Mother     Heart Attack Father 46         at 66    Hypertension Father     Other Brother         bipolar       Social History     Socioeconomic History    Marital status: Single     Spouse name: Justine Beltran88 [phone])     Number of children: 0    Years of education: None    Highest education level: None   Occupational History    Occupation: [employer]   Tobacco Use    Smoking status: Former Smoker     Packs/day: 0.25     Years: 22.00     Pack years: 5.50     Types: Cigarettes     Quit date: 2018     Years since quitting: 3.8    Smokeless tobacco: Never Used    Tobacco comment: contemplative, in process of quitting , discussed with patient   Vaping Use    Vaping Use: Never used   Substance and Sexual Activity    Alcohol use: Yes     Comment: occasionally    Drug use: No    Sexual activity: Yes     Partners: Female   Other Topics Concern    None   Social History Narrative    None     Social Determinants of Health     Financial Resource Strain:     Difficulty of Paying Living Expenses: Not on file   Food Insecurity:     Worried About Running Out of Food in the Last Year: Not on file    Elizabeth of Food in the Last Year: Not on file   Transportation Needs:     Lack of Transportation (Medical): Not on file    Lack of Transportation (Non-Medical):  Not on file   Physical Activity:     Days of Exercise per Week: Not on file    Minutes of Exercise per Session: Not on file   Stress:     Feeling of Stress : Not on file   Social Connections:     Frequency of Communication with Friends and Family: Not on file    Frequency of Social Gatherings with Friends and Family: Not on file    Attends Buddhism Services: Not on file    Active Member of Clubs or Organizations: Not on file    Attends Club or Organization Meetings: Not on file    Marital Status: Not on file   Intimate Partner Violence:     Fear of Current or Ex-Partner: Not on file    Emotionally Abused: Not on file    Physically Abused: Not on file    Sexually Abused: Not on file   Housing Stability:     Unable to Pay for Housing in the Last Year: Not on file    Number of [place] in the Last Year: Not on file    Unstable Housing in the Last Year: Not on file       Current Outpatient Medications   Medication Sig Dispense Refill    pramipexole (MIRAPEX) 1 MG tablet Take 1 tablet by mouth nightly 90 tablet 3    methylPREDNISolone (MEDROL DOSEPACK) 4 MG tablet Take by mouth. 1 kit 0    simvastatin (ZOCOR) 40 MG tablet TAKE ONE TABLET BY MOUTH NIGHTLY 90 tablet 3    carbidopa-levodopa (SINEMET)  MG per tablet TAKE 1 TABLET BY MOUTH ONE TIME A DAY 90 tablet 3    venlafaxine (EFFEXOR XR) 75 MG extended release capsule TAKE 1 CAPSULE BY MOUTH ONE TIME A DAY 90 capsule 3    ondansetron (ZOFRAN-ODT) 4 MG disintegrating tablet Take 1 tablet by mouth 3 times daily as needed for Nausea or Vomiting (Patient not taking: Reported on 4/4/2022) 8 tablet 0    Multiple Vitamins-Minerals (MULTIVITAL PO) Take by mouth      cetirizine (ZYRTEC) 10 MG tablet Take 10 mg by mouth daily       No current facility-administered medications for this visit. No Known Allergies    Vital signs:  Ht 5' 7\" (1.702 m)   Wt 196 lb (88.9 kg)   LMP  (LMP Unknown)   BMI 30.70 kg/m²              LEFT Knee Exam:    Gait: No use of assistive devices. No antalgic gait. Alignment: normal alignment. Inspection/skin: Skin is intact without erythema or ecchymosis. No gross deformity. Palpation: crepitus.  medial joint line tenderness present. Range of Motion: There is full range of motion. Strength: Normal quadriceps development. Effusion: Mild effusion. Ligamentous stability: No cruciate or collateral ligament instability. Neurologic and vascular: Skin is warm and well-perfused. Sensation is intact to light-touch. Special tests: Negative Francisco sign. RIGHT Knee Exam:    Gait: No use of assistive devices. No antalgic gait. Alignment: normal alignment. Inspection/skin: Skin is intact without erythema or ecchymosis. No gross deformity. Palpation: crepitus. no joint line tenderness present. Range of Motion: There is full range of motion. Strength: Normal quadriceps development. Effusion: No effusion or swelling present. Ligamentous stability: No cruciate or collateral ligament instability. Neurologic and vascular: Skin is warm and well-perfused. Sensation is intact to light-touch. Special tests: Negative Francisco sign. Radiology:     Pertinent imaging was interpreted and reviewed with the patient, both images and report. MRI of the left knee dated 4/22/2022 was interpreted and reviewed with the patient today. CONCLUSION:   1. Trizonal radial tear posterior horn medial meniscus root. Subcortical marrow reaction    posteromedial tibia. 2. Ruptured small Baker's cyst. Posteromedial swelling. 3. Patellofemoral arthropathy.  Intermediate grade chondromalacia. Capsulosynovitis. Assessment :  62year old female with medial meniscus tear of left knee, patellofemoral arthritis, and rupture Baker's cyst    Impression:  Encounter Diagnoses   Name Primary?     Tear of medial meniscus of left knee, unspecified tear type, unspecified whether old or current tear, initial encounter Yes    Patellofemoral arthritis of left knee     Ruptured Bakers cyst        Office Procedures:  Orders Placed This Encounter   Procedures    Ambulatory referral to Physical Therapy     Referral Priority:   Routine     Referral Type:   Eval and Treat     Referral Reason:   Specialty Services Required     Requested Specialty:   Physical Therapy     Number of Visits Requested:   1         Plan: Pertinent imaging was reviewed. The etiology, natural history, and treatment options for the disorder were discussed. The roles of activity medication, antiinflammatories, injections, bracing, physical therapy, and surgical interventions were all described to the patient and questions were answered. MRI shows a medial meniscus root tear with extrusion but I feel majority of her pain is stemming from her ruptured Baker's cyst and patellofemoral arthritis at this time. Her symptoms have improved since onset but she is leaving for vacation in [place] next week and feel she would benefit from an intraarticular cortisone. She wishes to proceed. She is also complaining of some patellofemoral right knee pain with activity. We will give her a prescription of Meloxicam to use as needed for her bilateral knee pain and inflammation. The indications and risks of steroid injection as well as treatment alternatives were discussed with the patient who consented to the procedure. Under sterile conditions and after informed consent was obtained the patient was given an injection into the LEFT knee. 2cc 40 mg of Depo-Medrol and 4 mL of 1% lidocaine were placed in the knee after it was prepped with chlorhexidine. This resulted in good relief of symptoms. There were no complications. The patient was advised to ice the knee this evening and to avoid vigorous activities for the next 2 days. They were advised to call us if there was any erythema, enduration, swelling or increasing pain.     The patient was advised that NSAID-type medications have several potential side effects that include: gastrointestinal irritation including hemorrhage, renal injury, as well as an increased risk for heart attack and stroke. The patient was asked to take the medication with food and to stop if there is any symptoms of GI upset, including heartburn, nausea, increased gas or diarrhea. I asked the patient to contact their medical provider for vomiting, abdominal pain or black/bloody stools. The patient should have renal function testing per his medical provider periodically if the medication is taken on a regular basis. The patient should be alert for any swelling in the lower extremities and should stop taking the medication immediately and contact their medical provider should this occur. In addition, the patient should stop taking the medication immediately and contact their medical provider should there be any shortness of breath, fatigue and be evaluated in an emergency facility for any chest pain. The patient expresses understanding of these issues and questions were answered. I will see her back if symptoms persist or worsen. Heri May is in agreement with this plan. All questions were answered to patient's satisfaction and was encouraged to call with any further questions. Total time spent for evaluation, education and development of treatment plan: 33 minutes        IDilshad ATC, am scribing for and in the presence of Dr. Rosaline Valenzuela. 05/04/22 4:35 PM Dilshad Pate ATC    I attest that I met personally with the patient, performed the described exam, reviewed the radiographic studies and medical records associated with this patient and supervised the services that are described above.      Ludy Willams MD

## 2022-05-04 NOTE — PROGRESS NOTES
5/4/22 4:41 PM     Lidocaine Injection      NDC: [address]-10    Lot Number: [id number]    Body Part: left knee

## 2022-05-06 ENCOUNTER — TELEPHONE (OUTPATIENT)
Dept: ORTHOPEDIC SURGERY | Age: 57
End: 2022-05-06

## 2022-05-06 DIAGNOSIS — M17.12 PATELLOFEMORAL ARTHRITIS OF LEFT KNEE: ICD-10-CM

## 2022-05-06 DIAGNOSIS — S83.242A TEAR OF MEDIAL MENISCUS OF LEFT KNEE, UNSPECIFIED TEAR TYPE, UNSPECIFIED WHETHER OLD OR CURRENT TEAR, INITIAL ENCOUNTER: Primary | ICD-10-CM

## 2022-05-06 DIAGNOSIS — M66.0 RUPTURED BAKERS CYST: ICD-10-CM

## 2022-05-06 RX ORDER — MELOXICAM 15 MG/1
15 TABLET ORAL DAILY
Qty: 30 TABLET | Refills: 2 | Status: SHIPPED | OUTPATIENT
Start: 2022-05-06 | End: 2022-07-07

## 2022-05-06 NOTE — TELEPHONE ENCOUNTER
Other PATIENT CALLED STATES THAT DR Rodriguez Officer WAS PUTTING HER ON A NEW MEDICATION BUT THE PHARMACY HAS NOT RECEIVED IT. SHE IS UNAWARE OF THE NAME OR TYPE OF MEDICATION.  PLS CALL TO [facility] [phone]      PATIENT IS GOING ON VACATION ON Monday SO SHE WOULD LIKE TO HAVE THIS BEFORE SHE LEAVES,

## 2022-05-20 ENCOUNTER — PATIENT MESSAGE (OUTPATIENT)
Dept: PRIMARY CARE CLINIC | Age: 57
End: 2022-05-20

## 2022-05-20 NOTE — TELEPHONE ENCOUNTER
From: Carmen Quick  To: Dr. Machado Spindle: 5/20/2022 8:20 AM EDT  Subject: COVID 19    Good Morning,    I was tested on 5/6 before leaving on vacation and the test was negative. I was tested on 5/16 before coming back into the country and the test was negative. I started with a headache, sore throat, cough, congestion, fatigue and fever on 5/18. I did a home test around noon on 5/18 and it back back positive. I took some Ibufrophen and the fever went away on 5/19 but I still have all the other symptoms. I'm currently in an Omicrone/COVID Study with [facility] and I just came back from an appointment with them. They gave me an oximeter since my pulse oximetry was low. Is there anything that you suggest or should I just ride it out? I'm not working today so if you need to get ahold of me, please call me.     Thank you,  Boom Shaffer

## 2022-05-31 ENCOUNTER — TELEPHONE (OUTPATIENT)
Dept: ORTHOPEDIC SURGERY | Age: 57
End: 2022-05-31

## 2022-06-08 ENCOUNTER — HOSPITAL ENCOUNTER (OUTPATIENT)
Dept: PHYSICAL THERAPY | Age: 57
Setting detail: THERAPIES SERIES
Discharge: HOME | End: 2022-06-08
Payer: COMMERCIAL

## 2022-06-08 DIAGNOSIS — M17.12 PATELLOFEMORAL ARTHRITIS OF LEFT KNEE: Primary | ICD-10-CM

## 2022-06-08 PROCEDURE — 97161 PT EVAL LOW COMPLEX 20 MIN: CPT

## 2022-06-08 PROCEDURE — 97110 THERAPEUTIC EXERCISES: CPT

## 2022-06-08 PROCEDURE — 97112 NEUROMUSCULAR REEDUCATION: CPT

## 2022-06-08 NOTE — FLOWSHEET NOTE
[facility], [address] [employer] [address] [place] [address]  Phone: [phone]   Fax:     [phone]    Physical Therapy Daily Treatment Note  Date:  2022    Patient Name:  Sonny Castellanos    :  1965  MRN: [id number]  Restrictions/Precautions:    Medical/Treatment Diagnosis Information:  · Diagnosis: U37.750R (ICD-10-CM) - Tear of medial meniscus of left knee, unspecified tear type, unspecified whether old or current tear, initial encounter M17.12 (ICD-10-CM) - Patellofemoral arthritis of left knee M66.0 (ICD-10-CM) - Ruptured Bakers cyst  · Treatment Diagnosis: S83.242A (ICD-10-CM) - Tear of medial meniscus of left knee, unspecified tear type, unspecified whether old or current tear, initial encounter M17.12 (ICD-10-CM) - Patellofemoral arthritis of left knee  Insurance/Certification information:  PT Insurance Information: [facility]  Physician Information:   [place]  Has the plan of care been signed (Y/N):        []  Yes  [x]  No     Date of Patient follow up with Physician:       Is this a Progress Report:     []  Yes  [x]  No        If Yes:  Date Range for reporting period:  Beginning 22  Ending    Progress report will be due (10 Rx or 30 days whichever is less): 95       Recertification will be due (POC Duration  / 90 days whichever is less): 22         Visit # Insurance Allowable Auth Required   1 [place]  30 visits Max []  Yes [x]  No        Functional Scale: FOTO knee 99    Date assessed:  22       Latex Allergy:  [x]NO      []YES  Preferred Language for Healthcare:   [x]English       []other:    Pain level:  0/10     SUBJECTIVE:  See eval    OBJECTIVE: See eval   Observation:    Test measurements:      RESTRICTIONS/PRECAUTIONS:     Exercises/Interventions:   Exercise/Equipment Resistance/Repetitions Other comments   Stretching/AROM       Hamstring 30\"hx3 L 6/ seated EOT    Quad     Calf stretch Piriformis             Strengthening      SL bridge 3x10 L 6/8   Wall squats 30\"hx3 6/8 45 deg knee flexion   SLR + 30\"hx3 L 6/8                    Neuromuscular re-ed      LAQ with BS  5\"hx10 L 6/8 seated 90<>45 deg   SLS with hip abd 3x10 bilat 6/8                      Quantum machines       Leg press        Leg extension       Leg curl               Manual interventions                                   Therapeutic Exercise and NMR EXR  [x] (29831) Provided verbal/tactile cueing for activities related to strengthening, flexibility, endurance, ROM for improvements in LE, proximal hip, and core control with self care, mobility, lifting, ambulation.  [] (29329) Provided verbal/tactile cueing for activities related to improving balance, coordination, kinesthetic sense, posture, motor skill, proprioception  to assist with LE, proximal hip, and core control in self care, mobility, lifting, ambulation and eccentric single leg control.      NMR and Therapeutic Activities:    [x] (48104 or 40990) Provided verbal/tactile cueing for activities related to improving balance, coordination, kinesthetic sense, posture, motor skill, proprioception and motor activation to allow for proper function of core, proximal hip and LE with self care and ADLs  [] (62153) Gait Re-education- Provided training and instruction to the patient for proper LE, core and proximal hip recruitment and positioning and eccentric body weight control with ambulation re-education including up and down stairs     Home Exercise Program:    [x] (20566) Reviewed/Progressed HEP activities related to strengthening, flexibility, endurance, ROM of core, proximal hip and LE for functional self-care, mobility, lifting and ambulation/stair navigation   [] (71027)Reviewed/Progressed HEP activities related to improving balance, coordination, kinesthetic sense, posture, motor skill, proprioception of core, proximal hip and LE for self care, mobility, lifting, and ambulation/stair navigation      Manual Treatments:  PROM / STM / Oscillations-Mobs:  G-I, II, III, IV (PA's, Inf., Post.)  [] (48821) Provided manual therapy to mobilize LE, proximal hip and/or LS spine soft tissue/joints for the purpose of modulating pain, promoting relaxation,  increasing ROM, reducing/eliminating soft tissue swelling/inflammation/restriction, improving soft tissue extensibility and allowing for proper ROM for normal function with self care, mobility, lifting and ambulation. Modalities:      Charges:  Timed Code Treatment Minutes: 24'+eval   Total Treatment Minutes: 4:10-5:10  60'       [x] EVAL (LOW) 99792 (typically 20 minutes face-to-face)  [] EVAL (MOD) 29022 (typically 30 minutes face-to-face)  [] EVAL (HIGH) 49788 (typically 45 minutes face-to-face)  [] RE-EVAL   [x] JI(69160) x 1    [] IONTO  [x] NMR (37240) x 1    [] VASO  [] Manual (40992) x      [] Other:  [] TA x      [] Mech Traction (94190)  [] ES(attended) (16190)      [] ES (un) (43681):     GOALS:   Patient stated goal: Prevent return in symptoms      []? Progressing: []? Met: []? Not Met: []? Adjusted     Therapist goals for Patient:   Short Term Goals: To be achieved in: 2 weeks  1. Independent in HEP and progression per patient tolerance, in order to prevent re-injury. []? Progressing: []? Met: []? Not Met: []? Adjusted   2. Patient will have a decrease in pain to facilitate improvement in movement, function, and ADLs as indicated by Functional Deficits. []? Progressing: []? Met: []? Not Met: []? Adjusted     Long Term Goals: To be achieved in: 4 weeks  1. Patient will demonstrate increase in L hamstring flexibility that is equal to R to prevent excessive stress on knee or compensatory movement patterns with high intensity activities in weight bearing. []? Progressing: []? Met: []? Not Met: []? Adjusted  2.  Patient will demonstrate an increase in LE strength to 5/5 in all directions tested in order to decrease stress placed on knee allowing pt to continue to complete ADL's and recreational activities without pain or limitations. []? Progressing: []? Met: []? Not Met: []? Adjusted    Overall Progression Towards Functional goals/ Treatment Progress Update:  [] Patient is progressing as expected towards functional goals listed. [] Progression is slowed due to complexities/Impairments listed. [] Progression has been slowed due to co-morbidities. [x] Plan just implemented, too soon to assess goals progression <30days   [] Goals require adjustment due to lack of progress  [] Patient is not progressing as expected and requires additional follow up with physician  [] Other    Prognosis for POC: [x] Good [] Fair  [] Poor      Patient requires continued skilled intervention: [x] Yes  [] No    Treatment/Activity Tolerance:  [x] Patient able to complete treatment  [] Patient limited by fatigue  [] Patient limited by pain     [] Patient limited by other medical complications  [] Other: 6/8 Challenged with exercises but no adverse effects noted or reported. Patient Education:                 HEP instruction:   Access Code: [place]  URL: [email or web address]  Date: 06/08/2022  Prepared by: Loree Nichols     Exercises  Seated Table Hamstring Stretch - 1 x daily - 7 x weekly - 1 sets - 5 reps - 30 hold  Single Leg Bridge - 1 x daily - 3-5 x weekly - 3 sets - 10 reps  Seated Long Arc Quad (90-45 Degree Range) - 1 x daily - 3-5 x weekly - 1 sets - 10 reps - 5 hold  Standing Hip Abduction - 1 x daily - 3-5 x weekly - 3 sets - 10 reps  Wall Quarter Squat - 1 x daily - 3-5 x weekly - 1 sets - 3 reps - 30 hold  Straight Leg Raise with Arm Support - 1 x daily - 3-5 x weekly - 1 sets - 3 reps - 30 hold     PLAN: See eval  [] Continue per plan of care [] Alter current plan (see comments above)  [x] Plan of care initiated [] Hold pending MD visit [] Discharge      Electronically signed by:  Junie Sahu, PT    Note: If patient does not return for scheduled/ recommended follow up visits, this note will serve as a discharge from care along with most recent update on progress.

## 2022-06-08 NOTE — PLAN OF CARE
[address]  Phone [phone]  Fax [phone]                                                       Physical Therapy Certification    Dear   Dr. Jaimes Nicely    We had the pleasure of evaluating the following patient for physical therapy services at [address] [place] [address]. A summary of our findings can be found in the initial assessment below. This includes our plan of care. If you have any questions or concerns regarding these findings, please do not hesitate to contact me at the office phone number checked above.   Thank you for the referral.       Physician Signature:_______________________________Date:__________________  By signing above (or electronic signature), therapists plan is approved by physician      Patient: Jacqui Maloney   : 1965   MRN: [id number]  Referring Physician:  Katelyn Phipps MD       Evaluation Date: 2022      Medical Diagnosis Information:  Diagnosis: W07.629P (ICD-10-CM) - Tear of medial meniscus of left knee, unspecified tear type, unspecified whether old or current tear, initial encounter M17.12 (ICD-10-CM) - Patellofemoral arthritis of left knee M66.0 (ICD-10-CM) - Ruptured Bakers cyst   Treatment Diagnosis: S83.242A (ICD-10-CM) - Tear of medial meniscus of left knee, unspecified tear type, unspecified whether old or current tear, initial encounter M17.12 (ICD-10-CM) - Patellofemoral arthritis of left knee                                         Insurance information: PT Insurance Information: [place] [employer]     Precautions/ Contra-indications: N/A    C-SSRS Triggered by Intake questionnaire (Past 2 wk assessment):   [x] No, Questionnaire did not trigger screening.   [] Yes, Patient intake triggered further evaluation      [] C-SSRS Screening completed  [] PCP notified via Plan of Care  [] Emergency services notified     Latex Allergy:  [x]NO      []YES  Preferred Language for Healthcare:   [x]English       []other:    SUBJECTIVE: Patient arrived to physical therapy with d/t L knee pain that started over 1 month. Pt suffered a twisting injury to L knee unloading  on Easter and experienced pain at posterior/lateral aspect of her knee and described as sharp with standing and twisting movements. Pt originally had pain with walking and was using cane for a couple days. D/t severity of symptoms she saw referring MD and had MRI with results listed below. Pt saw MD on 5/4 and received a cortisone injection and Meloxicam prescription. Since receiving injection knee is feeling back to normal. States she is having no pain or limitations with daily activities. Pt states she has returned to playing pickle ball and golf twice a week without symptoms. MRI of the left knee dated 4/22/2022 was interpreted and reviewed with the patient today. CONCLUSION:   1. Trizonal radial tear posterior horn medial meniscus root. Subcortical marrow reaction    posteromedial tibia. 2. Ruptured small Baker's cyst. Posteromedial swelling. 3. Patellofemoral arthropathy.  Intermediate grade chondromalacia. Capsulosynovitis. Relevant Medical History: R achilles repair 2012. Takes medication for high cholesterol. Breast cancer 2020 in remission.    Functional Disability Index: FOTO knee 99     Pain Scale: 0/10  Easing factors: Medication  Provocative factors: No pain currently      Type: []Constant   []Intermittent  []Radiating []Localized []other:     Numbness/Tingling: Denies     Occupation/School: Desk work, full time    Living Status/Prior Level of Function: Independent with ADLs and IADLs,     OBJECTIVE:      Flexibility L R Comment   Hamstring 149 155  Measured 90/90   Gastroc 107 108    Radha's WNL WNL     Ely's WNL WNL             ROM PROM AROM Comment    L R L R    Flexion   134 132    Extension   0 0 Strength L R Comment   Quad 5 5    Hamstring 4+ 5    Hip  flexion 4 4+    Hip abd 4 4    Hip add 5 5    Hip ext 4+ 5        Special Test Results/Comment   Patellar compression Neg bilat   Crepitus Mild bilaterally with knee AROM   Flexion Test Neg bilat    Valgus Laxity Neg for instability or pain reproduction   Varus Laxity Neg for instability or pain reproduction   Francisco's Neg bilat       Girth L R   Joint line 40 cm 40 cm      Reflexes/Sensation:    [x]Dermatomes/Myotomes intact    []Reflexes equal and normal bilaterally   []Other:    Joint mobility: Patella    [x]Normal    []Hypo   []Hyper    Palpation: No tenderness     Functional Mobility/Transfers:   SLS: Eyes open, mild postural sway, no compensatory movement or c/o pain   DL squat: Depth WNL, excessive forward knee flexion, no c/o pain or compensatory movements     Posture: Mild knee valgus with moderate lateral patella tilt    Bandages/Dressings/Incisions: N/A    Gait: Independent, WNL     Orthopedic Special Tests: See above                        [x] Patient history, allergies, meds reviewed. Medical chart reviewed. See intake form. Review Of Systems (ROS):  [x]Performed Review of systems (Integumentary, CardioPulmonary, Neurological) by intake and observation. Intake form has been scanned into medical record. Patient has been instructed to contact their primary care physician regarding ROS issues if not already being addressed at this time.       Co-morbidities/Complexities (which will affect course of rehabilitation):   []None           Arthritic conditions   []Rheumatoid arthritis (M05.9)  []Osteoarthritis (M19.91)   Cardiovascular conditions   []Hypertension (I10)  [x]Hyperlipidemia (E78.5)  []Angina pectoris (I20)  []Atherosclerosis (I70)   Musculoskeletal conditions   []Disc pathology   []Congenital spine pathologies   [x]Prior surgical intervention  []Osteoporosis (M81.8)  []Osteopenia (M85.8)   Endocrine conditions   []Hypothyroid (E03.9)  []Hyperthyroid Gastrointestinal conditions   []Constipation (G52.85)   Metabolic conditions   []Morbid obesity (E66.01)  []Diabetes type 1(E10.65) or 2 (E11.65)   []Neuropathy (G60.9)     Pulmonary conditions   []Asthma (J45)  []Coughing   []COPD (J44.9)   Psychological Disorders  []Anxiety (F41.9)  []Depression (F32.9)   []Other:   []Other:     Past history of cancer      Barriers to/and or personal factors that will affect rehab potential:              []Age  []Sex              []Motivation/Lack of Motivation                        []Co-Morbidities              []Cognitive Function, education/learning barriers              []Environmental, home barriers              []profession/work barriers  []past PT/medical experience  []other:  Justification: No barriers at this time     Falls Risk Assessment (30 days):   [x] Falls Risk assessed and no intervention required.   [] Falls Risk assessed and Patient requires intervention due to being higher risk   TUG score (>12s at risk):     [] Falls education provided, including         ASSESSMENT:   Functional Impairments:     []Noted lumbar/proximal hip/LE hypomobility   []Decreased LE functional ROM   []Decreased core/proximal hip strength and neuromuscular control   [x]Decreased LE functional strength   []Reduced balance/proprioceptive control   []other:      Functional Activity Limitations (from functional questionnaire and intake)   []Reduced ability to tolerate prolonged functional positions   []Reduced ability or difficulty with changes of positions or transfers between positions   []Reduced ability to maintain good posture and demonstrate good body mechanics with sitting, bending, and lifting   []Reduced ability to sleep   [] Reduced ability or tolerance with driving and/or computer work   []Reduced ability to perform lifting, carrying tasks   []Reduced ability to squat   []Reduced ability to forward bend   []Reduced ability to ambulate prolonged functional periods/distances/surfaces   []Reduced ability to ascend/descend stairs   []Reduced ability to run, hop or jump   []other: No limitations at this time      Participation Restrictions   []Reduced participation in self care activities   []Reduced participation in home management activities   []Reduced participation in work activities   []Reduced participation in social activities. []Reduced participation in sport activities. Classification :    []Signs/symptoms consistent with post-surgical status including decreased ROM, strength and function.    []Signs/symptoms consistent with joint sprain/strain   [x]Signs/symptoms consistent with patella-femoral syndrome   []Signs/symptoms consistent with knee OA/hip OA   []Signs/symptoms consistent with internal derangement of knee/Hip   []Signs/symptoms consistent with functional hip weakness/NMR control      []Signs/symptoms consistent with tendinitis/tendinosis    []signs/symptoms consistent with pathology which may benefit from Dry needling      []other:      Prognosis/Rehab Potential:      [x]Excellent   []Good    []Fair   []Poor    Tolerance of evaluation/treatment:    [x]Excellent   []Good    []Fair   []Poor    Physical Therapy Evaluation Complexity Justification  [x] A history of present problem with:  [] no personal factors and/or comorbidities that impact the plan of care;  [x]1-2 personal factors and/or comorbidities that impact the plan of care  []3 personal factors and/or comorbidities that impact the plan of care  [x] An examination of body systems using standardized tests and measures addressing any of the following: body structures and functions (impairments), activity limitations, and/or participation restrictions;:  [x] a total of 1-2 or more elements   [] a total of 3 or more elements   [] a total of 4 or more elements   [x] A clinical presentation with:  [x] stable and/or uncomplicated characteristics   [] evolving clinical presentation with changing characteristics  [] unstable and unpredictable characteristics;   [x] Clinical decision making of [x] low, [] moderate, [] high complexity using standardized patient assessment instrument and/or measurable assessment of functional outcome. [x] EVAL (LOW) 63295 (typically 20 minutes face-to-face)  [] EVAL (MOD) 19016 (typically 30 minutes face-to-face)  [] EVAL (HIGH) 53248 (typically 45 minutes face-to-face)  [] RE-EVAL       PLAN  Frequency/Duration:  Pt to complete HEP independently and f/u as needed. If pt tolerates HEP well and does not have any symptoms she will be discharged from PT. Interventions:  [x]  Therapeutic exercise including: strength training, ROM, for Lower extremity and core   [x]  NMR activation and proprioception for LE, Glutes and Core   []  Manual therapy as indicated for LE, Hip and spine to include: Dry Needling/IASTM, STM, PROM, Gr I-IV mobilizations, manipulation. [x] Modalities as needed that may include: thermal agents, E-stim, Biofeedback, US, iontophoresis as indicated  [x] Patient education on joint protection, postural re-education, activity modification, progression of HEP. HEP instruction:   Access Code: Kristan Alvarez  URL: [email or web address]  Date: 06/08/2022  Prepared by: Stephany Mcgregor    Exercises  Seated Table Hamstring Stretch - 1 x daily - 7 x weekly - 1 sets - 5 reps - 30 hold  Single Leg Bridge - 1 x daily - 3-5 x weekly - 3 sets - 10 reps  Seated Long Arc Quad (90-45 Degree Range) - 1 x daily - 3-5 x weekly - 1 sets - 10 reps - 5 hold  Standing Hip Abduction - 1 x daily - 3-5 x weekly - 3 sets - 10 reps  Wall Quarter Squat - 1 x daily - 3-5 x weekly - 1 sets - 3 reps - 30 hold  Straight Leg Raise with Arm Support - 1 x daily - 3-5 x weekly - 1 sets - 3 reps - 30 hold      GOALS:   Patient stated goal: Prevent return in symptoms      [] Progressing: [] Met: [] Not Met: [] Adjusted    Therapist goals for Patient:   Short Term Goals: To be achieved in: 2 weeks  1.

## 2022-06-12 DIAGNOSIS — F33.41 RECURRENT MAJOR DEPRESSIVE DISORDER, IN PARTIAL REMISSION (HCC): ICD-10-CM

## 2022-06-13 RX ORDER — VENLAFAXINE HYDROCHLORIDE 75 MG/1
CAPSULE, EXTENDED RELEASE ORAL
Qty: 90 CAPSULE | Refills: 3 | Status: SHIPPED | OUTPATIENT
Start: 2022-06-13

## 2022-06-13 NOTE — TELEPHONE ENCOUNTER
Medication:   Requested Prescriptions     Pending Prescriptions Disp Refills    venlafaxine (EFFEXOR XR) 75 MG extended release capsule [Pharmacy Med Name: VENLAFAXINE HCL ER 75MG CP24] 90 capsule 3     Sig: TAKE 1 CAPSULE BY MOUTH ONE TIME A DAY        Last Filled:      Patient Phone Number: [phone] (home)     Last appt: 4/4/2022   Next appt: 12/01/2022  No RTC Documented     Last OARRS: No flowsheet data found.

## 2022-06-27 ENCOUNTER — TELEPHONE (OUTPATIENT)
Dept: ORTHOPEDIC SURGERY | Age: 57
End: 2022-06-27

## 2022-06-27 ENCOUNTER — OFFICE VISIT (OUTPATIENT)
Dept: ORTHOPEDIC SURGERY | Age: 57
End: 2022-06-27
Payer: COMMERCIAL

## 2022-06-27 DIAGNOSIS — M79.662 PAIN OF LEFT CALF: ICD-10-CM

## 2022-06-27 DIAGNOSIS — M17.12 PATELLOFEMORAL ARTHRITIS OF LEFT KNEE: Primary | ICD-10-CM

## 2022-06-27 PROCEDURE — 99214 OFFICE O/P EST MOD 30 MIN: CPT | Performed by: ORTHOPAEDIC SURGERY

## 2022-06-27 PROCEDURE — E0114 CRUTCH UNDERARM PAIR NO WOOD: HCPCS | Performed by: ORTHOPAEDIC SURGERY

## 2022-06-27 NOTE — PROGRESS NOTES
Chief Complaint  Follow-up (left knee. )      History of Present Illness:  Jacqui Maloney is a pleasant 62 y.o. female for follow-up regarding her left knee. As a review she underwent an injury approximately 1 month ago, we went over her left knee MRI which showed a medial meniscus root tear with extrusion with a ruptured Bakers cyst.  She underwent a left knee intra-articular cortisone injection. Patient states that initially she felt relief however recently she has had an increase in left calf pain, swelling and tenderness. Medical History:  Patient's medications, allergies, past medical, surgical, social and family histories were reviewed and updated as appropriate. Pain Assessment  Location of Pain: Knee  Location Modifiers: Left  Severity of Pain: 3  Quality of Pain: Dull  Frequency of Pain: Constant  Aggravating Factors:  (weightbearing, movement)  Limiting Behavior: Yes  Relieving Factors: Rest,Ice,Nsaids  Result of Injury: No  Work-Related Injury: No  Are there other pain locations you wish to document?: No  ROS: Review of systems reviewed from Patient History Form completed today and available in the patient's chart under the Media tab. Pertinent items are noted in HPI  Review of systems reviewed from Patient History Form completed today and available in the patient's chart under the Media tab. Vital Signs:  LMP  (LMP Unknown)       LEFT Knee Exam:     Gait: No use of assistive devices. No antalgic gait.     Alignment: normal alignment.     Inspection/skin: Skin is intact without erythema or ecchymosis. No gross deformity. Calf swollen and shiny appearing      Palpation: crepitus. medial joint line tenderness present.     Range of Motion: There is full range of motion.      Strength: Normal quadriceps development.      Effusion: Mild effusion.      Ligamentous stability: No cruciate or collateral ligament instability.      Neurologic and vascular: Skin is warm and well-perfused. Sensation is intact to light-touch.      Special tests:  Positive chauncey sign        RIGHT Knee Exam:     Gait: No use of assistive devices. No antalgic gait.     Alignment: normal alignment.     Inspection/skin: Skin is intact without erythema or ecchymosis. No gross deformity.      Palpation: crepitus. no joint line tenderness present.     Range of Motion: There is full range of motion.      Strength: Normal quadriceps development.      Effusion: No effusion or swelling present.      Ligamentous stability: No cruciate or collateral ligament instability.      Neurologic and vascular: Skin is warm and well-perfused. Sensation is intact to light-touch.      Special tests: Negative Francisco sign. Radiology:       Pertinent imaging was interpreted and reviewed with the patient today, images only - no report available. No new imaging was obtained during today's visit. Assessment : 40-year-old female with a active medial meniscus root tear with extrusion. Left lower extremity swelling concerning for DVT    Impression:  Encounter Diagnoses   Name Primary?  Patellofemoral arthritis of left knee Yes    Pain of left calf        Office Procedures:  Orders Placed This Encounter   Procedures    VL DUP LOWER EXTREMITY VENOUS LEFT     Standing Status:   Future     Standing Expiration Date:   6/27/2023     Order Specific Question:   Reason for exam:     Answer:   STAT VENOUS DOPPLER LLE EVAL CALF PAIN R/O DVT    Aluminum Crutches     Patient was prescribed [employer]line Aluminum Crutches. This mobility device is required for the following reasons:    Patient has mobility limitations that significantly impair their ability to participate in one or more mobility related activities of daily living. The patient is able to safely use the mobility device. Functional mobility deficit can be sufficiently resolved with the use of this device.     Verbal and written instructions for the use of and application of this item were provided. The patient was educated and fit by a healthcare professional with expert knowledge and specialization in brace application while under the direct supervision of the treating physician. They were instructed to contact the office immediately should the equipment result in increased pain, decreased sensation, increased swelling or worsening of the condition. Plan: Pertinent imaging was reviewed. The etiology, natural history, and treatment options for the disorder were discussed. The roles of activity medication, antiinflammatories, injections, bracing, physical therapy, and surgical interventions were all described to the patient and questions were answered. Uzair Davis has a active medial meniscus tear with a root tear with extrusion. Unfortunately she has a recent increase in left calf pain and swelling. Her calf is tender to the touch. I am highly concerned for a DVT. At this time she is a candidate for a STAT LLE doppler US to evaluate for a DVT. She would like to proceed with this. If this is her ruptured bakers cyst we will see her back for surgical consult for root repair vs scope. Gilberto Bernstein is in agreement with this plan. All questions were answered to patient's satisfaction and was encouraged to call with any further questions. Total time spent for evaluation, education, and development of treatment plan: 35 minutes    Mey Yoon, [address] [facility]  6/27/2022    During this exam, I, Mey Yoon PA-C, functioned as a scribe for Dr. Haven Day. The history taking and physical examination were performed by Dr. Haven Day. All counseling during the appointment was performed between the patient and Dr. Haven Day. 6/27/2022 5:05 PM    This dictation was performed with a verbal recognition program (DRAGON) and it was checked for errors. It is possible that there are still dictated errors within this office note.   If so, please bring any areas to my attention for an addendum. All efforts were made to ensure that this office note is accurate. I attest that I met personally with the patient, performed the described exam, reviewed the radiographic studies and medical records associated with this patient and supervised the services that are described above.      Filiberto Nina MD

## 2022-06-28 ENCOUNTER — TELEPHONE (OUTPATIENT)
Dept: ORTHOPEDIC SURGERY | Age: 57
End: 2022-06-28

## 2022-06-28 ENCOUNTER — PATIENT MESSAGE (OUTPATIENT)
Dept: PRIMARY CARE CLINIC | Age: 57
End: 2022-06-28

## 2022-06-28 NOTE — PROGRESS NOTES
Received a call that patient was concerned she had a blood clot in her leg and was unable to get an ultrasound in a timely manner. She does have risk factors for blood clots, namely history of breast cancer, so we will begin empiric treatment until we get the results of her ultrasound.

## 2022-06-28 NOTE — TELEPHONE ENCOUNTER
PATIENT STATES HER INSURANCE ADVISED HER NOT TO GO TO THE ER. PATIENT DOES NOT WANT TO WAIT TILL TOMORROW TO BE SEEN. PATIENT'S INSURANCE STATES SHE CAN BE SEEN AT [facility] TODAY AND WANTS THE APPOINTMENT TO BE SCHEDULE. PLEASE ADVISE.

## 2022-06-28 NOTE — TELEPHONE ENCOUNTER
Contacted central scheduling 3 times  Checked every hospital  no STAT orders taken today  No add on available for any location   Earliest [facility] tomorrow arrival 8:00 am  Advised that patient can go to ER if feels can't wait until tomorrow    Informed patient of this  She stated not pleased that a STAT order is not being taken as STAT

## 2022-06-28 NOTE — TELEPHONE ENCOUNTER
Per Dr. Negrito Blackburn, advised patient that Dr. Negrito Blackburn sent in a blood thinner for her to start. Once the results from the doppler are in, a decision will be made to continue or discontinue the blood thinner.

## 2022-06-28 NOTE — TELEPHONE ENCOUNTER
Doppler scheduled for 1:30 today @ [facility]  Faxed order [phone]  Informed patient

## 2022-06-28 NOTE — TELEPHONE ENCOUNTER
Submitted PA for Eliquis 5MG tablets  Via CMM Key: [id number] STATUS: PENDING    Received a fax with the following questions from [employer]:  1. Eliquis 5MG Starter Pack is on the formulary and does not require a Prior Authorization. Can the patient use the Eliquis Starter Pack instead? 2. If no, can the patient consolidate and use two 10MG tablets instead of four 5MG tablets to achieve the same total daily dose for 7 days. If no, please provide rationale why the patient cannot dose consolidate. Please respond to the pool ( P [facility] [id number] [facility]). Thank you.

## 2022-06-29 NOTE — TELEPHONE ENCOUNTER
Pt Stats she does not need this Lisa PA is not needed. She stats she does not have a blood clot, she has a baker cyst and received medication for it.

## 2022-06-29 NOTE — TELEPHONE ENCOUNTER
Faxed message to [employer] to cancel the PA request for Eliquis. Thanks for the information!

## 2022-07-07 DIAGNOSIS — M66.0 RUPTURED BAKERS CYST: ICD-10-CM

## 2022-07-07 DIAGNOSIS — S83.242A TEAR OF MEDIAL MENISCUS OF LEFT KNEE, UNSPECIFIED TEAR TYPE, UNSPECIFIED WHETHER OLD OR CURRENT TEAR, INITIAL ENCOUNTER: ICD-10-CM

## 2022-07-07 DIAGNOSIS — M17.12 PATELLOFEMORAL ARTHRITIS OF LEFT KNEE: ICD-10-CM

## 2022-07-07 RX ORDER — MELOXICAM 15 MG/1
TABLET ORAL
Qty: 30 TABLET | Refills: 1 | Status: SHIPPED | OUTPATIENT
Start: 2022-07-07 | End: 2022-07-28

## 2022-07-13 ENCOUNTER — OFFICE VISIT (OUTPATIENT)
Dept: ORTHOPEDIC SURGERY | Age: 57
End: 2022-07-13
Payer: COMMERCIAL

## 2022-07-13 VITALS — BODY MASS INDEX: 30.53 KG/M2 | WEIGHT: 190 LBS | HEIGHT: 66 IN

## 2022-07-13 DIAGNOSIS — S83.232D COMPLEX TEAR OF MEDIAL MENISCUS OF LEFT KNEE AS CURRENT INJURY, SUBSEQUENT ENCOUNTER: Primary | ICD-10-CM

## 2022-07-13 PROCEDURE — 99214 OFFICE O/P EST MOD 30 MIN: CPT | Performed by: PHYSICIAN ASSISTANT

## 2022-07-13 RX ORDER — TRAMADOL HYDROCHLORIDE 50 MG/1
50 TABLET ORAL EVERY 4 HOURS PRN
Qty: 28 TABLET | Refills: 0 | Status: SHIPPED | OUTPATIENT
Start: 2022-07-13 | End: 2022-07-20

## 2022-07-13 RX ORDER — CYCLOBENZAPRINE HCL 10 MG
10 TABLET ORAL 2 TIMES DAILY PRN
Qty: 28 TABLET | Refills: 0 | Status: SHIPPED | OUTPATIENT
Start: 2022-07-13

## 2022-07-13 NOTE — PROGRESS NOTES
Chief Complaint  Follow-up (left knee )      History of Present Illness:  Nati Munoz is a pleasant 62 y.o. female here for follow-up regarding her left knee. As a review, approximately 3 months ago she suffered a fall, we obtained an MRI which showed a medial meniscal root tear with extrusion and a ruptured Baker's cyst.  At our last visit there was concern for a DVT with her increased swelling. She underwent a Doppler ultrasound which was negative for a DVT and it did show a Baker's cyst.  She still has persistent unrelenting medial joint line tenderness. This is present with any type of weightbearing. She has tried anti-inflammatories physical therapy with no improvement. She would like to talk about her surgical options. Medical History:  Patient's medications, allergies, past medical, surgical, social and family histories were reviewed and updated as appropriate. Pain Assessment  Location of Pain: Knee  Location Modifiers: Left  Severity of Pain: 2  Quality of Pain: Dull  Duration of Pain: A few minutes  Frequency of Pain: Intermittent  Aggravating Factors:  (applying weight)  Limiting Behavior: Yes  Relieving Factors: Rest  Result of Injury: No  Work-Related Injury: No  Are there other pain locations you wish to document?: No  ROS: Review of systems reviewed from Patient History Form completed today and available in the patient's chart under the Media tab. Pertinent items are noted in HPI  Review of systems reviewed from Patient History Form completed today and available in the patient's chart under the Media tab. Vital Signs:  Ht 5' 6\" (1.676 m)   Wt 190 lb (86.2 kg)   LMP  (LMP Unknown)   BMI 30.67 kg/m²       Left knee examination:    Gait: No use of assistive devices. No antalgic gait. Alignment: normal alignment. Inspection/skin: Skin is intact without erythema or ecchymosis. No gross deformity.      Palpation: Medial joint line tenderness    Range of Motion: There is full range of motion. Strength: Normal quadriceps development. Effusion: Moderate effusion. Ligamentous stability: No cruciate or collateral ligament instability. Neurologic and vascular: Skin is warm and well-perfused. Sensation is intact to light-touch. Special tests: Positive Francisco sign. Right knee examination:    Gait: No use of assistive devices. No antalgic gait. Alignment: normal alignment. Inspection/skin: Skin is intact without erythema or ecchymosis. No gross deformity. Palpation: mild crepitus. no joint line tenderness present. Range of Motion: There is full range of motion. Strength: Normal quadriceps development. Effusion: No effusion or swelling present. Ligamentous stability: No cruciate or collateral ligament instability. Neurologic and vascular: Skin is warm and well-perfused. Sensation is intact to light-touch. Special tests: Negative Francisco sign. Radiology:       Pertinent imaging was interpreted and reviewed with the patient today, images only - no report available. MRI of the left knee dated 4/22/2022 was interpreted and reviewed with the patient today. CONCLUSION:   1. Trizonal radial tear posterior horn medial meniscus root. Subcortical marrow reaction    posteromedial tibia. 2. Ruptured small Baker's cyst. Posteromedial swelling. 3. Patellofemoral arthropathy.  Intermediate grade chondromalacia. Capsulosynovitis.            Assessment : 59-year-old female with radial tear posterior horn medial meniscus root with extrusion    Impression:  Encounter Diagnosis   Name Primary?  Complex tear of medial meniscus of left knee as current injury, subsequent encounter Yes       Office Procedures:  No orders of the defined types were placed in this encounter. Plan: Pertinent imaging was reviewed. The etiology, natural history, and treatment options for the disorder were discussed.   The roles of activity medication, antiinflammatories, injections, bracing, physical therapy, and surgical interventions were all described to the patient and questions were answered. Patient has a root tear with extrusion. She has tried anti-inflammatories and physical therapy with no improvement. Fortunately she does not have a blood clot but she does have a Baker's cyst which is likely due to the swelling due to the meniscus tear. I do believe she is a candidate for a root repair. This will be discussed in detail with Dr. Deven Larsen at follow-up visit. In the meantime, crutches were recommended, anti-inflammatories were prescribed as well as a muscle relaxer    Kathie Srivastava is in agreement with this plan. All questions were answered to patient's satisfaction and was encouraged to call with any further questions. Total time spent for evaluation, education, and development of treatment plan: 35 minutes    Steve Saunders, [address] [place] [address] Anali  7/13/2022    This dictation was performed with a verbal recognition program (DRAGON) and it was checked for errors. It is possible that there are still dictated errors within this office note. If so, please bring any areas to my attention for an addendum. All efforts were made to ensure that this office note is accurate.

## 2022-07-15 DIAGNOSIS — E78.00 HYPERCHOLESTEREMIA: ICD-10-CM

## 2022-07-15 DIAGNOSIS — G25.81 RESTLESS LEG: ICD-10-CM

## 2022-07-15 RX ORDER — SIMVASTATIN 40 MG
TABLET ORAL
Qty: 90 TABLET | Refills: 3 | Status: SHIPPED | OUTPATIENT
Start: 2022-07-15

## 2022-07-15 RX ORDER — CARBIDOPA/LEVODOPA 25MG-250MG
TABLET ORAL
Qty: 90 TABLET | Refills: 3 | Status: SHIPPED | OUTPATIENT
Start: 2022-07-15

## 2022-07-15 NOTE — TELEPHONE ENCOUNTER
Medication:   Requested Prescriptions     Pending Prescriptions Disp Refills    simvastatin (ZOCOR) 40 MG tablet [Pharmacy Med Name: SIMVASTATIN 40MG TABS] 90 tablet 3     Sig: TAKE ONE TABLET BY MOUTH NIGHTLY    carbidopa-levodopa (SINEMET)  MG per tablet [Pharmacy Med Name: CARBIDOPA-LEVODOPA 25-250MG TABS] 90 tablet 3     Sig: TAKE 1 TABLET BY MOUTH ONE TIME A DAY        Last Filled:  09/27/21 and  09/27/21    Patient Phone Number: [phone] (home)     Last appt: 4/4/2022 RTC Return if symptoms worsen or fail to improve  Next appt: Visit date not found    Last OARRS: No flowsheet data found.

## 2022-07-21 ENCOUNTER — TELEPHONE (OUTPATIENT)
Dept: ORTHOPEDIC SURGERY | Age: 57
End: 2022-07-21

## 2022-07-21 ENCOUNTER — OFFICE VISIT (OUTPATIENT)
Dept: PRIMARY CARE CLINIC | Age: 57
End: 2022-07-21
Payer: COMMERCIAL

## 2022-07-21 VITALS
WEIGHT: 193 LBS | DIASTOLIC BLOOD PRESSURE: 75 MMHG | HEIGHT: 66 IN | SYSTOLIC BLOOD PRESSURE: 106 MMHG | TEMPERATURE: 97.4 F | HEART RATE: 86 BPM | OXYGEN SATURATION: 95 % | BODY MASS INDEX: 31.02 KG/M2

## 2022-07-21 DIAGNOSIS — R11.2 PONV (POSTOPERATIVE NAUSEA AND VOMITING): ICD-10-CM

## 2022-07-21 DIAGNOSIS — S83.242A ACUTE MEDIAL MENISCUS TEAR OF LEFT KNEE, INITIAL ENCOUNTER: Primary | ICD-10-CM

## 2022-07-21 DIAGNOSIS — Z01.818 PRE-OP EXAM: ICD-10-CM

## 2022-07-21 DIAGNOSIS — E78.00 HYPERCHOLESTEREMIA: ICD-10-CM

## 2022-07-21 DIAGNOSIS — G47.33 OBSTRUCTIVE SLEEP APNEA: ICD-10-CM

## 2022-07-21 DIAGNOSIS — F33.41 RECURRENT MAJOR DEPRESSIVE DISORDER, IN PARTIAL REMISSION (HCC): ICD-10-CM

## 2022-07-21 DIAGNOSIS — Z98.890 PONV (POSTOPERATIVE NAUSEA AND VOMITING): ICD-10-CM

## 2022-07-21 LAB
A/G RATIO: 1.8 (ref 1.1–2.2)
ALBUMIN SERPL-MCNC: 4.3 G/DL (ref 3.4–5)
ALP BLD-CCNC: 93 U/L (ref 40–129)
ALT SERPL-CCNC: 16 U/L (ref 10–40)
ANION GAP SERPL CALCULATED.3IONS-SCNC: 9 MMOL/L (ref 3–16)
AST SERPL-CCNC: 24 U/L (ref 15–37)
BILIRUB SERPL-MCNC: 0.3 MG/DL (ref 0–1)
BUN BLDV-MCNC: 25 MG/DL (ref 7–20)
CALCIUM SERPL-MCNC: 9.1 MG/DL (ref 8.3–10.6)
CHLORIDE BLD-SCNC: 105 MMOL/L (ref 99–110)
CO2: 28 MMOL/L (ref 21–32)
CREAT SERPL-MCNC: 0.9 MG/DL (ref 0.6–1.1)
GFR AFRICAN AMERICAN: >60
GFR NON-AFRICAN AMERICAN: >60
GLUCOSE BLD-MCNC: 95 MG/DL (ref 70–99)
POTASSIUM SERPL-SCNC: 5 MMOL/L (ref 3.5–5.1)
SODIUM BLD-SCNC: 142 MMOL/L (ref 136–145)
TOTAL PROTEIN: 6.7 G/DL (ref 6.4–8.2)

## 2022-07-21 PROCEDURE — 93000 ELECTROCARDIOGRAM COMPLETE: CPT | Performed by: FAMILY MEDICINE

## 2022-07-21 PROCEDURE — 99213 OFFICE O/P EST LOW 20 MIN: CPT | Performed by: FAMILY MEDICINE

## 2022-07-21 RX ORDER — ONDANSETRON 4 MG/1
4 TABLET, ORALLY DISINTEGRATING ORAL 3 TIMES DAILY PRN
Qty: 8 TABLET | Refills: 0 | Status: SHIPPED | OUTPATIENT
Start: 2022-07-21 | End: 2022-10-10

## 2022-07-21 SDOH — ECONOMIC STABILITY: FOOD INSECURITY: WITHIN THE PAST 12 MONTHS, THE FOOD YOU BOUGHT JUST DIDN'T LAST AND YOU DIDN'T HAVE MONEY TO GET MORE.: NEVER TRUE

## 2022-07-21 SDOH — ECONOMIC STABILITY: FOOD INSECURITY: WITHIN THE PAST 12 MONTHS, YOU WORRIED THAT YOUR FOOD WOULD RUN OUT BEFORE YOU GOT MONEY TO BUY MORE.: NEVER TRUE

## 2022-07-21 ASSESSMENT — ENCOUNTER SYMPTOMS
RHINORRHEA: 0
NAUSEA: 0
SORE THROAT: 0
ABDOMINAL PAIN: 0
VOMITING: 0
DIARRHEA: 0
EYE PAIN: 0
COLOR CHANGE: 0
CONSTIPATION: 0
SHORTNESS OF BREATH: 0
COUGH: 0

## 2022-07-21 ASSESSMENT — SOCIAL DETERMINANTS OF HEALTH (SDOH): HOW HARD IS IT FOR YOU TO PAY FOR THE VERY BASICS LIKE FOOD, HOUSING, MEDICAL CARE, AND HEATING?: NOT HARD AT ALL

## 2022-07-21 NOTE — PROGRESS NOTES
Chief Complaint   Patient presents with    Pre-op Exam    Cholesterol Problem           Depression              HPI:Iris presents for evaluation and management of preoperative evaluation for left knee meniscus repair. Antonietta Figueroa notes that in April as she was unloading a  in her home she twisted her left knee and had severe and excruciating pain in her left knee. She had some subsequent swelling in her left leg as well. Initially thought that this might be a blood clot she underwent a venous duplex which was negative for clot. She underwent a subsequent MRI which revealed meniscus tear and a ruptured Baker's cyst.  She has tried conservative measures including cortisone injection therapy with out benefit. She has a history of hypercholesterolemia and is taking and tolerating simvastatin without muscle aches or abdominal pain. She has a history of obstructive sleep apnea and is compliant with her CPAP machine. She has a history of depression which is well controlled with venlafaxine. She denies any depressed mood or decreased interest in activities or change in appetite or sleep. She has a history of postoperative nausea and vomiting for which she has used Zofran in the past with good effect. She notes no other anesthetic complications. Denies chest pain shortness of breath or dyspnea on exertion and notes no easy bleeding or bruising    Review of Systems   Constitutional:  Negative for chills and fever. HENT:  Negative for ear pain, rhinorrhea and sore throat. Eyes:  Negative for pain and visual disturbance. Respiratory:  Negative for cough and shortness of breath. Cardiovascular:  Negative for chest pain and palpitations. Gastrointestinal:  Negative for abdominal pain, constipation, diarrhea, nausea and vomiting. Genitourinary:  Negative for dysuria and frequency. Musculoskeletal:  Positive for arthralgias and joint swelling. Negative for myalgias.    Skin:  Negative for color change and rash. Neurological:  Negative for weakness, numbness and headaches. Hematological:  Negative for adenopathy. Does not bruise/bleed easily. Psychiatric/Behavioral:  Negative for dysphoric mood, self-injury and suicidal ideas. The patient is not nervous/anxious. No Known Allergies  New Prescriptions    No medications on file     Current Outpatient Medications   Medication Sig Dispense Refill    ondansetron (ZOFRAN-ODT) 4 MG disintegrating tablet Take 1 tablet by mouth 3 times daily as needed for Nausea or Vomiting 8 tablet 0    simvastatin (ZOCOR) 40 MG tablet TAKE ONE TABLET BY MOUTH NIGHTLY 90 tablet 3    carbidopa-levodopa (SINEMET)  MG per tablet TAKE 1 TABLET BY MOUTH ONE TIME A DAY 90 tablet 3    cyclobenzaprine (FLEXERIL) 10 MG tablet Take 1 tablet by mouth 2 times daily as needed for Muscle spasms 28 tablet 0    meloxicam (MOBIC) 15 MG tablet TAKE 1 TABLET BY MOUTH ONE TIME A DAY 30 tablet 1    venlafaxine (EFFEXOR XR) 75 MG extended release capsule TAKE 1 CAPSULE BY MOUTH ONE TIME A DAY 90 capsule 3    pramipexole (MIRAPEX) 1 MG tablet Take 1 tablet by mouth nightly 90 tablet 3    Multiple Vitamins-Minerals (MULTIVITAL PO) Take by mouth      cetirizine (ZYRTEC) 10 MG tablet Take 10 mg by mouth daily       No current facility-administered medications for this visit.        Past Medical History:   Diagnosis Date    Anxiety     Breast cancer [facility])     [facility]     Dr. Fabian Miller: pN0 January 2021: ER/MI+, HER2 Neg :  Aromatase Inhibitor tx (will need DEXA)    Hypercholesteremia     Obstructive sleep apnea (adult) (pediatric)     APAP 10-18 (ave 12 cwp)    PONV (postoperative nausea and vomiting)     Restless leg      Past Surgical History:   Procedure Laterality Date    ACHILLES TENDON SURGERY Right     BREAST BIOPSY Right 01/26/2021    RIGHT SENTINEL LYMPH NODE BIOPSY performed by Cyn Willams DO at [address]  10/29/2020 BREAST REDUCTION SURGERY Bilateral 10/29/2020    BILATERAL BREAST REDUCTION AND SPY performed by Eliceo Moser MD at [address]    COLONOSCOPY N/A 2020    COLORECTAL CANCER SCREENING, HIGH RISK performed by Hue Brody MD [facility] [address] [facility] VAGINAL  2002    DUB:  Dr. Yoli Concepcion still in    [address]     Family History   Problem Relation Age of Onset    Other Mother     Heart Attack Father 46         at 66    Hypertension Father     Other Brother         bipolar     Social History     Tobacco Use    Smoking status: Former     Packs/day: 0.25     Years: 22.00     Pack years: 5.50     Types: Cigarettes     Quit date: 2018     Years since quittin.0    Smokeless tobacco: Never    Tobacco comments:     contemplative, in process of quitting , discussed with patient   Vaping Use    Vaping Use: Never used   Substance Use Topics    Alcohol use: Yes     Comment: occasionally    Drug use: No       Objective   /75   Pulse 86   Temp 97.4 °F (36.3 °C)   Ht 5' 6\" (1.676 m)   Wt 193 lb (87.5 kg)   LMP  (LMP Unknown)   SpO2 95%   BMI 31.15 kg/m²   Wt Readings from Last 3 Encounters:   22 193 lb (87.5 kg)   22 190 lb (86.2 kg)   22 196 lb (88.9 kg)       Physical Exam  Constitutional:       Appearance: She is well-developed. HENT:      Head: Normocephalic and atraumatic. Nose: Nose normal.      Mouth/Throat:      Pharynx: No oropharyngeal exudate. Eyes:      General: No scleral icterus. Right eye: No discharge. Left eye: No discharge. Pupils: Pupils are equal, round, and reactive to light. Neck:      Thyroid: No thyromegaly. Cardiovascular:      Rate and Rhythm: Normal rate and regular rhythm. Pulses:           Dorsalis pedis pulses are 2+ on the right side and 2+ on the left side. Posterior tibial pulses are 2+ on the right side and 2+ on the left side.       Heart sounds: Normal heart sounds. No murmur heard. No friction rub. No gallop. Comments: No Edema Lower Extremities  Pulmonary:      Effort: Pulmonary effort is normal.      Breath sounds: Normal breath sounds. No wheezing or rales. Abdominal:      General: Bowel sounds are normal. There is no distension. Palpations: Abdomen is soft. There is no hepatomegaly or splenomegaly. Tenderness: There is no abdominal tenderness. There is no guarding or rebound. Musculoskeletal:         General: No tenderness or deformity. Cervical back: Normal range of motion and neck supple. Lymphadenopathy:      Cervical: No cervical adenopathy. Skin:     General: Skin is warm and dry. Findings: No erythema or rash. Neurological:      Mental Status: She is alert. Cranial Nerves: No cranial nerve deficit. Sensory: No sensory deficit.       Gait: Gait normal.   Psychiatric:         Speech: Speech normal.         Behavior: Behavior normal.         Chemistry        Component Value Date/Time     10/20/2021 1055    K 4.9 10/20/2021 1055     10/20/2021 1055    CO2 24 10/20/2021 1055    BUN 10 10/20/2021 1055    CREATININE 1.1 10/20/2021 1055        Component Value Date/Time    CALCIUM 9.7 10/20/2021 1055    ALKPHOS 97 10/20/2021 1055    AST 28 10/20/2021 1055    ALT 26 10/20/2021 1055    BILITOT <0.2 10/20/2021 1055          Lab Results   Component Value Date    WBC 5.9 01/21/2021    HGB 14.4 01/21/2021    HCT 42.6 01/21/2021    MCV 92.3 01/21/2021     01/21/2021     No results found for: LABA1C  No results found for: EAG  No results found for: LABA1C  No components found for: CHLPL  Lab Results   Component Value Date    TRIG 196 (H) 10/20/2021    TRIG 164 (H) 10/02/2020    TRIG 130 07/23/2019     Lab Results   Component Value Date    HDL 52 10/20/2021    HDL 46 10/02/2020    HDL 59 07/23/2019     Lab Results   Component Value Date    LDLCALC 129 (H) 10/20/2021    LDLCALC 150 (H) 10/02/2020 Hepatitis B vaccine  Aged Out    Hib vaccine  Aged Out    Meningococcal (ACWY) vaccine  Aged Out    Pneumococcal 0-64 years Vaccine  Aged Out       RTC 6 months and as needed

## 2022-07-21 NOTE — PROGRESS NOTES
[facility] PRE-SURGICAL TESTING INSTRUCTIONS                                  PRIOR TO PROCEDURE DATE:        1. PLEASE FOLLOW ANY  GUIDELINES/ INSTRUCTIONS PRIOR TO YOUR PROCEDURE AS ADVISED BY YOUR SURGEON. 2. Arrange for someone to drive you home and be with you for the first 24 hours after discharge for your safety after your procedure for which you received sedation. Ensure it is someone we can share information with regarding your discharge. 3. You must contact your surgeon for instructions IF:  You are taking any blood thinners, aspirin, anti-inflammatory or vitamin E. There is a change in your physical condition such as a cold, fever, rash, cuts, sores or any other infection, especially near your surgical site. 4. Do not drink alcohol the day before or day of your procedure. 5. A Pre-op History and Physical for surgery MUST be completed by your Physician or Urgent Care within 30 days of your procedure date. Please bring a copy with you on the day of your procedure and along with any other testing performed. THE DAY OF YOUR PROCEDURE:  1. Follow instructions for ARRIVAL TIME as DIRECTED BY YOUR SURGEON. 2. Enter the MAIN entrance from [facility] and follow the signs to the free [employer] or [employer] parking (offered free of charge 6am-5pm). 3. Enter the Main Entrance of the hospital (do not enter from the lower level of the parking garage). Upon entrance, check in with the  at the main desk on your left. If no one is available at the desk, proceed into the [facility] Waiting Room and go through the door directly into the [facility]. There is a Check-in desk ACROSS from Room 5 (marked with a sign hanging from the ceiling). The phone number for the surgery center is [phone]. 4. Please call [phone] option #2 option #2 if you have not been preregistered yet.   On the day of your procedure bring your insurance card and photo ID. You will be registered at your bedside once brought back to your room. 5. DO NOT EAT ANYTHING eight hours prior to your arrival for surgery. May have 8 ounces of water 4 hours prior to your arrival for surgery. NOTE: ALL Gastric, Bariatric and Bowel surgery patients MUST follow their surgeon's instructions. 6. MEDICATIONS   Bariatric patient's call surgeon if on diabetic medications as some need to be stopped 1 week preop  Use your usual dose of inhalers the morning of surgery. BRING your rescue inhaler with you to hospital.   Anesthesia does NOT want you to take insulin the morning of surgery. They will control your blood sugar while you are at the hospital. Please contact your ordering physician for instructions regarding your insulin the night before your procedure. If you have an insulin pump, please keep it set on basal rate. 7. Do not swallow water when brushing teeth. No gum, candy, mints or ice chips. Refrain from smoking or at least decrease the amount. 8. Dress in loose, comfortable clothing appropriate for redressing after your procedure. Do not wear jewelry (including body piercings), make-up (especially NO eye make-up), fingernail polish (NO toenail polish if foot/leg surgery), lotion, powders or metal hairclips. 9. Dentures, glasses, or contacts will need to be removed before your procedure. Bring cases for your glasses, contacts, dentures, or hearing aids to protect them while you are in surgery. 10. If you use a CPAP, please bring it with you on the day of your procedure. 11. We recommend that valuable personal  belongings such as cash, cell phones, e-tablets or jewelry, be left at home during your stay. The hospital will not be responsible for valuables that are not secured in the hospital safe. However, if your insurance requires a co-pay, you may want to bring a method of payment, i.e. Check or credit card, if you wish to pay your co-pay the day of surgery.       12. If you are to stay overnight, you may bring a bag with personal items. Please have any large items you may need brought in by your family after your arrival to your hospital room. 15. If you have a Living Will or Durable Power of , please bring a copy on the day of your procedure. 15. With your permission, one family member may accompany you while you are being prepared for surgery. Once you are ready, additional family members may join you. HOW WE KEEP YOU SAFE and WORK TO PREVENT SURGICAL SITE INFECTIONS:  1. Health care workers should always check your ID bracelet to verify your name and birth date. You will be asked many times to state your name, date of birth, and allergies. 2. Health care workers should always clean their hands with soap or alcohol gel before providing care to you. It is okay to ask anyone if they cleaned their hands before they touch you. 3. You will be actively involved in verifying the type of procedure you are having and ensuring the correct surgical site. This will be confirmed multiple times prior to your procedure. Do NOT bautista your surgery site UNLESS instructed to by your surgeon. 4. Do not shave or wax for 72 hours prior to procedure near your operative site. Shaving with a razor can irritate your skin and make it easier to develop an infection. On the day of your procedure, any hair that needs to be removed near the surgical site will be clipped by a healthcare worker using a special clippers designed to avoid skin irritation. 5. When you are in the operating room, your surgical site will be cleansed with a special soap, and in most cases, you will be given an antibiotic before the surgery begins. What to expect AFTER YOUR PROCEDURE:  1. Immediately following your procedure, your will be taken to the PACU for the first phase of your recovery.   Your nurse will help you recover from any potential side effects of anesthesia, such as extreme drowsiness, changes in your vital signs or breathing patterns. Nausea, headache, muscle aches, or sore throat may also occur after anesthesia. Your nurse will help you manage these potential side effects. 2. For comfort and safety, arrange to have someone at home with you for the first 24 hours after discharge. 3. You and your family will be given written instructions about your diet, activity, dressing care, medications, and return visits. 4. Once at home, should issues with nausea, pain, or bleeding occur, or should you notice any signs of infection, you should call your surgeon. 5. Always clean your hands before and after caring for your wound. Do not let your family touch your surgery site without cleaning their hands. 6. Narcotic pain medications can cause significant constipation. You may want to add a stool softener to your postoperative medication schedule or speak to your surgeon on how best to manage this SIDE EFFECT. SPECIAL INSTRUCTIONS     Thank you for allowing us to care for you. We strive to exceed your expectations in the delivery of care and service provided to you and your family. If you need to contact the [facility] staff for any reason, please call us at [phone]    Instructions reviewed with patient during preadmission testing phone interview.   Yaya Leavitt RN.7/21/2022 .10:08 AM      ADDITIONAL EDUCATIONAL INFORMATION REVIEWED PER PHONE WITH YOU AND/OR YOUR FAMILY:  No Hibiclens® Bathing Instructions   Yes Antibacterial Soap

## 2022-07-21 NOTE — PROGRESS NOTES
Place patient label inside box (if no patient label, complete below)  Name:  :  MR#:   [address] / PROCEDURE  I (we), Cheryl Martel (Patient Name) authorize aNbeel Gomez (Provider / Gearl Kid) and/or such assistants as may be selected by him/her, to perform the following operation/procedure(s): LEFT KNEE ARTHROSCOPY, MEDIAL MENISCUS, ROOT REPAIR, SYNOVECTOMY, CHONDROPLASTY       Note: If unable to obtain consent prior to an emergent procedure, document the emergent reason in the medical record. This procedure has been explained to my (our) satisfaction and included in the explanation was: The intended benefit, nature, and extent of the procedure to be performed; The significant risks involved and the probability of success; Alternative procedures and methods of treatment; The dangers and probable consequences of such alternatives (including no procedure or treatment); The expected consequences of the procedure on my future health; Whether other qualified individuals would be performing important surgical tasks and/or whether  would be present to advise or support the procedure. I (we) understand that there are other risks of infection and other serious complications in the pre-operative/procedural and postoperative/procedural stages of my (our) care. I (we) have asked all of the questions which I (we) thought were important in deciding whether or not to undergo treatment or diagnosis. These questions have been answered to my (our) satisfaction. I (we) understand that no assurance can be given that the procedure will be a success, and no guarantee or warranty of success has been given to me (us).     It has been explained to me (us) that during the course of the operation/procedure, unforeseen conditions may be revealed that necessitate extension of the original procedure(s) or different procedure(s) than those set forth in Paragraph 1. I (we) authorize and request that the above-named physician, his/her assistants or his/her designees, perform procedures as necessary and desirable if deemed to be in my (our) best interest.     Revised 8/2/2021                                                                          Page 1 of 2         I acknowledge that health care personnel may be observing this procedure for the purpose of medical education or other specified purposes as may be necessary as requested and/or approved by my (our) physician. I (we) consent to the disposal by the hospital Pathologist of the removed tissue, parts or organs in accordance with hospital policy. I do ____ do not ____ consent to the use of a local infiltration pain blocking agent that will be used by my provider/surgical provider to help alleviate pain during my procedure. I do ____ do not ____ consent to an emergent blood transfusion in the case of a life-threatening situation that requires blood components to be administered. This consent is valid for 24 hours from the beginning of the procedure. This patient does ____ or does not ____ currently have a DNR status/order. If DNR order is in place, obtain Addendum to the Surgical Consent for ALL Patients with a DNR Order to address justin-operative status for limited intervention or DNR suspension.      I have read and fully understand the above Consent for Operation/Procedure and that all blanks were completed before I signed the consent.   _____________________________       _____________________      ____/____am/pm  Signature of Patient or legal representative      Printed Name / Relationship            Date / Time   ____________________________       _____________________      ____/____am/pm  Witness to Signature                                    Printed Name                    Date / Time    If patient is unable to sign or is a minor, complete the following)  Patient is a minor, ____ years of age, or unable to sign because:   ______________________________________________________________________________________________    If a phone consent is obtained, consent will be documented by using two health care professionals, each affirming that the consenting party has no questions and gives consent for the procedure discussed with the physician/provider.   _____________________          ____________________       _____/_____am/pm   2nd witness to phone consent        Printed name           Date / Time    Informed Consent:  I have provided the explanation described above in section 1 to the patient and/or legal representative.  I have provided the patient and/or legal representative with an opportunity to ask any questions about the proposed operation/procedure.   ___________________________          ____________________         ____/____am/pm  Provider / Proceduralist                            Printed name            Date / Time  Revised 8/2/2021                                                                      Page 2 of 2

## 2022-07-21 NOTE — TELEPHONE ENCOUNTER
CPT: 34442  AUTH: [id number]  DATE RANGE: 7/28/22 TO 9/25/22  INSURANCE: [facility]  LOCATION [facility]  NOTE: DOC SCANNED

## 2022-07-21 NOTE — TELEPHONE ENCOUNTER
General Question     Subject: SURGERY QUESTION  Patient and /or Facility Request: Yara Current  Contact Number:  [phone]    PATIENT WOULD LIKE A CALL BACK CONCERNING A UPCOMING SURGERY THAT PATIENT HAS COMING UP SHE DIDN'T KNOW ANYTHING ABOUT PATIENT CAN BE REACHED AT THE NUMBER LISTED ABOVE

## 2022-07-21 NOTE — Clinical Note
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 She has post operative nausea and vomiting (I gave her zofran). I am only checking her kidney and liver function. Please let me know if you want any other labs (ECG was beautiful).  Emanuel Mccurdy

## 2022-07-25 ENCOUNTER — OFFICE VISIT (OUTPATIENT)
Dept: ORTHOPEDIC SURGERY | Age: 57
End: 2022-07-25
Payer: COMMERCIAL

## 2022-07-25 VITALS — HEIGHT: 66 IN | BODY MASS INDEX: 30.53 KG/M2 | WEIGHT: 190 LBS

## 2022-07-25 DIAGNOSIS — S83.232D COMPLEX TEAR OF MEDIAL MENISCUS OF LEFT KNEE AS CURRENT INJURY, SUBSEQUENT ENCOUNTER: Primary | ICD-10-CM

## 2022-07-25 PROCEDURE — 99214 OFFICE O/P EST MOD 30 MIN: CPT | Performed by: ORTHOPAEDIC SURGERY

## 2022-07-25 PROCEDURE — L1832 KO ADJ JNT POS R SUP PRE CST: HCPCS | Performed by: ORTHOPAEDIC SURGERY

## 2022-07-25 NOTE — PROGRESS NOTES
Chief Complaint  Follow-up (Left knee )      History of Present Illness:  Madison Salinas is a pleasant 62 y.o. female who presents today for follow up evaluation of left knee pain. As a review, approximately 3 months ago she suffered a fall, we obtained an MRI which showed a medial meniscal root tear with extrusion and a ruptured Baker's cyst. She still has persistent unrelenting medial joint line tenderness. This is present with any type of weightbearing. She has tried anti-inflammatories physical therapy with no improvement. She would like to talk about her surgical options. No new injuries reported. Medical History:  Patient's medications, allergies, past medical, surgical, social and family histories were reviewed and updated as appropriate. Pertinent items are noted in HPI  Review of systems reviewed from Patient History Form completed today and available in the patient's chart under the Media tab.          Pain Assessment  Location of Pain: Knee  Location Modifiers: Left  Quality of Pain: Aching  Duration of Pain: Persistent  Frequency of Pain: Constant  Aggravating Factors:  (certain movement)  Limiting Behavior: Yes  Relieving Factors: Rest  Result of Injury: No  Work-Related Injury: No  Are there other pain locations you wish to document?: No    Past Medical History:   Diagnosis Date    Anxiety     Breast cancer ([facility] [address]     Breast Cancer - right breast ([facility] [address]     Dr. Roberto Massed: pN0 January 2021: ER/ND+, HER2 Neg :  Aromatase Inhibitor tx (will need DEXA)    Hypercholesteremia     Obstructive sleep apnea (adult) (pediatric)     APAP 10-18 (ave 12 cwp)    PONV (postoperative nausea and vomiting)     Restless leg     Sleep apnea     uses cpap        Past Surgical History:   Procedure Laterality Date    ACHILLES TENDON SURGERY Right     BREAST BIOPSY Right 01/26/2021    RIGHT SENTINEL LYMPH NODE BIOPSY performed by Yong Curling, DO at [address] [facility]  10/29/2020    BREAST REDUCTION SURGERY Bilateral 10/29/2020    BILATERAL BREAST REDUCTION AND SPY performed by Jaguar Calivllo MD at [address]    COLONOSCOPY N/A 2020    COLORECTAL CANCER SCREENING, HIGH RISK performed by Miguel Montiel MD at [place] [address]. ([facility])      HYSTERECTOMY, VAGINAL  2002    DUB:  Dr. Karyn Ernst still in    [address] [facility] History   Problem Relation Age of Onset    Other Mother     Heart Attack Father 46         at 66    Hypertension Father     Other Brother         bipolar       Social History     Socioeconomic History    Marital status: Single     Spouse name: Alease Sicard ([phone])     Number of children: 0    Years of education: None    Highest education level: None   Occupational History    Occupation: [employer]   Tobacco Use    Smoking status: Former     Packs/day: 0.25     Years: 22.00     Pack years: 5.50     Types: Cigarettes     Quit date: 2018     Years since quittin.0    Smokeless tobacco: Never    Tobacco comments:     contemplative, in process of quitting , discussed with patient   Vaping Use    Vaping Use: Never used   Substance and Sexual Activity    Alcohol use: Yes     Comment: occasionally    Drug use: No    Sexual activity: Yes     Partners: Female     Social Determinants of Health     Financial Resource Strain: Low Risk     Difficulty of Paying Living Expenses: Not hard at all   Food Insecurity: No Food Insecurity    Worried About Running Out of Food in the Last Year: Never true    Ran Out of Food in the Last Year: Never true       Current Outpatient Medications   Medication Sig Dispense Refill    ondansetron (ZOFRAN-ODT) 4 MG disintegrating tablet Take 1 tablet by mouth 3 times daily as needed for Nausea or Vomiting 8 tablet 0    simvastatin (ZOCOR) 40 MG tablet TAKE ONE TABLET BY MOUTH NIGHTLY 90 tablet 3    carbidopa-levodopa (SINEMET)  MG per tablet TAKE 1 TABLET BY MOUTH ONE TIME A DAY 90 tablet 3    cyclobenzaprine (FLEXERIL) 10 MG tablet Take 1 tablet by mouth 2 times daily as needed for Muscle spasms 28 tablet 0    meloxicam (MOBIC) 15 MG tablet TAKE 1 TABLET BY MOUTH ONE TIME A DAY 30 tablet 1    venlafaxine (EFFEXOR XR) 75 MG extended release capsule TAKE 1 CAPSULE BY MOUTH ONE TIME A DAY 90 capsule 3    pramipexole (MIRAPEX) 1 MG tablet Take 1 tablet by mouth nightly 90 tablet 3    Multiple Vitamins-Minerals (MULTIVITAL PO) Take by mouth      cetirizine (ZYRTEC) 10 MG tablet Take 10 mg by mouth daily       No current facility-administered medications for this visit. No Known Allergies    Vital signs:  Ht 5' 6\" (1.676 m)   Wt 190 lb (86.2 kg)   LMP  (LMP Unknown)   BMI 30.67 kg/m²              Left knee examination:     Gait: No use of assistive devices. No antalgic gait. Alignment: normal alignment. Inspection/skin: Skin is intact without erythema or ecchymosis. No gross deformity. Palpation: Medial joint line tenderness     Range of Motion: There is full range of motion. Strength: Normal quadriceps development. Effusion: Moderate effusion. Ligamentous stability: No cruciate or collateral ligament instability. Neurologic and vascular: Skin is warm and well-perfused. Sensation is intact to light-touch. Special tests: Positive Francisco sign. Right knee examination:     Gait: No use of assistive devices. No antalgic gait. Alignment: normal alignment. Inspection/skin: Skin is intact without erythema or ecchymosis. No gross deformity. Palpation: mild crepitus. no joint line tenderness present. Range of Motion: There is full range of motion. Strength: Normal quadriceps development. Effusion: No effusion or swelling present. Ligamentous stability: No cruciate or collateral ligament instability. Neurologic and vascular: Skin is warm and well-perfused. Sensation is intact to light-touch.      Special tests: Negative Francisco sign. Radiology:     Pertinent imaging was interpreted and reviewed with the patient. MRI of the left knee dated 4/22/2022. CONCLUSION:   1. Trizonal radial tear posterior horn medial meniscus root. Subcortical marrow reaction   posteromedial tibia. 2. Ruptured small Baker's cyst. Posteromedial swelling. 3. Patellofemoral arthropathy. Intermediate grade chondromalacia. Capsulosynovitis. Assessment :  51-year-old female with radial tear posterior horn medial meniscus root with extrusion    Impression:  Encounter Diagnosis   Name Primary? Complex tear of medial meniscus of left knee as current injury, subsequent encounter Yes       Office Procedures:  No orders of the defined types were placed in this encounter. Plan: Pertinent imaging was reviewed. The etiology, natural history, and treatment options for the disorder were discussed. The roles of activity medication, antiinflammatories, injections, bracing, physical therapy, and surgical interventions were all described to the patient and questions were answered. Patient has a root tear with extrusion. She has tried anti-inflammatories and physical therapy with no improvement. I do believe she is a candidate for a left knee arthroscopy with medial meniscus root repair versus medial menisectomy. She wishes to proceed. Risks, benefits and potential complications of arthroscopic left knee surgery were discussed with the patient. Risks discussed include but are not limited to bleeding, infection, anesthetic risk, injury to nerves and blood vessels, deep vein thrombosis, residual stiffness and weakness, and the need for revision surgery. The patient also understands that anesthetic risks include cardiopulmonary issues, drug reactions and even death. The patient voices an understanding of the importance of physical therapy and home exercises after surgery. All questions were answered.     No personal history of blood clots. No Family history of blood clots. No personal history of bleeding disorders. No personal history antibiotic allergies. No personal intolerance or allergies to NSAIDs. No personal intolerance or allergies to narcotics. No personal diabetes. Plans to do postoperative physical therapy at [place]. I will see her back post operatively, sooner if needed. Goldy Phipps is in agreement with this plan. All questions were answered to patient's satisfaction and was encouraged to call with any further questions. Total time spent for evaluation, education and development of treatment plan: 35 minutes        Daisy SUAREZ ATC, am scribing for and in the presence of Dr. Lara Spine. 07/25/22 2:10 PM Daisy Salgado ATC      I attest that I met personally with the patient, performed the described exam, reviewed the radiographic studies and medical records associated with this patient and supervised the services that are described above.      Juliet Roach MD

## 2022-07-27 ENCOUNTER — ANESTHESIA EVENT (OUTPATIENT)
Dept: OPERATING ROOM | Age: 57
End: 2022-07-27
Payer: COMMERCIAL

## 2022-07-28 ENCOUNTER — ANESTHESIA (OUTPATIENT)
Dept: OPERATING ROOM | Age: 57
End: 2022-07-28
Payer: COMMERCIAL

## 2022-07-28 ENCOUNTER — HOSPITAL ENCOUNTER (OUTPATIENT)
Age: 57
Setting detail: OUTPATIENT SURGERY
Discharge: HOME | End: 2022-07-28
Attending: ORTHOPAEDIC SURGERY | Admitting: ORTHOPAEDIC SURGERY
Payer: COMMERCIAL

## 2022-07-28 VITALS
SYSTOLIC BLOOD PRESSURE: 115 MMHG | RESPIRATION RATE: 16 BRPM | TEMPERATURE: 97 F | HEART RATE: 106 BPM | WEIGHT: 193 LBS | DIASTOLIC BLOOD PRESSURE: 77 MMHG | OXYGEN SATURATION: 93 % | BODY MASS INDEX: 31.02 KG/M2 | HEIGHT: 66 IN

## 2022-07-28 DIAGNOSIS — S83.249A ACUTE MEDIAL MENISCAL TEAR, UNSPECIFIED LATERALITY, INITIAL ENCOUNTER: Primary | ICD-10-CM

## 2022-07-28 PROCEDURE — 6360000002 HC RX W HCPCS: Performed by: NURSE ANESTHETIST, CERTIFIED REGISTERED

## 2022-07-28 PROCEDURE — C1713 ANCHOR/SCREW BN/BN,TIS/BN: HCPCS | Performed by: ORTHOPAEDIC SURGERY

## 2022-07-28 PROCEDURE — 3700000000 HC ANESTHESIA ATTENDED CARE: Performed by: ORTHOPAEDIC SURGERY

## 2022-07-28 PROCEDURE — 3600000004 HC SURGERY LEVEL 4 BASE: Performed by: ORTHOPAEDIC SURGERY

## 2022-07-28 PROCEDURE — 6370000000 HC RX 637 (ALT 250 FOR IP): Performed by: ANESTHESIOLOGY

## 2022-07-28 PROCEDURE — 7100000001 HC PACU RECOVERY - ADDTL 15 MIN: Performed by: ORTHOPAEDIC SURGERY

## 2022-07-28 PROCEDURE — 3700000001 HC ADD 15 MINUTES (ANESTHESIA): Performed by: ORTHOPAEDIC SURGERY

## 2022-07-28 PROCEDURE — 7100000010 HC PHASE II RECOVERY - FIRST 15 MIN: Performed by: ORTHOPAEDIC SURGERY

## 2022-07-28 PROCEDURE — 2709999900 HC NON-CHARGEABLE SUPPLY: Performed by: ORTHOPAEDIC SURGERY

## 2022-07-28 PROCEDURE — A4217 STERILE WATER/SALINE, 500 ML: HCPCS | Performed by: ORTHOPAEDIC SURGERY

## 2022-07-28 PROCEDURE — 2580000003 HC RX 258: Performed by: ORTHOPAEDIC SURGERY

## 2022-07-28 PROCEDURE — 7100000000 HC PACU RECOVERY - FIRST 15 MIN: Performed by: ORTHOPAEDIC SURGERY

## 2022-07-28 PROCEDURE — 6360000002 HC RX W HCPCS: Performed by: ORTHOPAEDIC SURGERY

## 2022-07-28 PROCEDURE — 6360000002 HC RX W HCPCS: Performed by: ANESTHESIOLOGY

## 2022-07-28 PROCEDURE — 2580000003 HC RX 258: Performed by: ANESTHESIOLOGY

## 2022-07-28 PROCEDURE — 3600000014 HC SURGERY LEVEL 4 ADDTL 15MIN: Performed by: ORTHOPAEDIC SURGERY

## 2022-07-28 PROCEDURE — 2720000010 HC SURG SUPPLY STERILE: Performed by: ORTHOPAEDIC SURGERY

## 2022-07-28 PROCEDURE — 7100000011 HC PHASE II RECOVERY - ADDTL 15 MIN: Performed by: ORTHOPAEDIC SURGERY

## 2022-07-28 DEVICE — ENDOBUTTON, 4.0 MM X 12 MM
Type: IMPLANTABLE DEVICE | Site: KNEE | Status: FUNCTIONAL
Brand: ENDOBUTTON

## 2022-07-28 DEVICE — ANCHOR SUT L19.5MM DIA3.5MM BIOCOMPOSITE KNOTLESS PUSHLOCK: Type: IMPLANTABLE DEVICE | Site: KNEE | Status: FUNCTIONAL

## 2022-07-28 RX ORDER — SODIUM CHLORIDE 0.9 % (FLUSH) 0.9 %
5-40 SYRINGE (ML) INJECTION PRN
Status: DISCONTINUED | OUTPATIENT
Start: 2022-07-28 | End: 2022-07-28 | Stop reason: HOSPADM

## 2022-07-28 RX ORDER — SODIUM CHLORIDE 9 MG/ML
INJECTION, SOLUTION INTRAVENOUS PRN
Status: DISCONTINUED | OUTPATIENT
Start: 2022-07-28 | End: 2022-07-28 | Stop reason: HOSPADM

## 2022-07-28 RX ORDER — SCOLOPAMINE TRANSDERMAL SYSTEM 1 MG/1
1 PATCH, EXTENDED RELEASE TRANSDERMAL
Status: DISCONTINUED | OUTPATIENT
Start: 2022-07-28 | End: 2022-07-28 | Stop reason: HOSPADM

## 2022-07-28 RX ORDER — SODIUM CHLORIDE, SODIUM LACTATE, POTASSIUM CHLORIDE, AND CALCIUM CHLORIDE .6; .31; .03; .02 G/100ML; G/100ML; G/100ML; G/100ML
IRRIGANT IRRIGATION PRN
Status: DISCONTINUED | OUTPATIENT
Start: 2022-07-28 | End: 2022-07-28 | Stop reason: HOSPADM

## 2022-07-28 RX ORDER — PHENYLEPHRINE HYDROCHLORIDE 10 MG/ML
INJECTION INTRAVENOUS PRN
Status: DISCONTINUED | OUTPATIENT
Start: 2022-07-28 | End: 2022-07-28 | Stop reason: SDUPTHER

## 2022-07-28 RX ORDER — SODIUM CHLORIDE 0.9 % (FLUSH) 0.9 %
5-40 SYRINGE (ML) INJECTION EVERY 12 HOURS SCHEDULED
Status: DISCONTINUED | OUTPATIENT
Start: 2022-07-28 | End: 2022-07-28 | Stop reason: HOSPADM

## 2022-07-28 RX ORDER — DEXAMETHASONE SODIUM PHOSPHATE 4 MG/ML
INJECTION, SOLUTION INTRA-ARTICULAR; INTRALESIONAL; INTRAMUSCULAR; INTRAVENOUS; SOFT TISSUE PRN
Status: DISCONTINUED | OUTPATIENT
Start: 2022-07-28 | End: 2022-07-28 | Stop reason: SDUPTHER

## 2022-07-28 RX ORDER — FENTANYL CITRATE 50 UG/ML
INJECTION, SOLUTION INTRAMUSCULAR; INTRAVENOUS PRN
Status: DISCONTINUED | OUTPATIENT
Start: 2022-07-28 | End: 2022-07-28 | Stop reason: SDUPTHER

## 2022-07-28 RX ORDER — FENTANYL CITRATE 50 UG/ML
25 INJECTION, SOLUTION INTRAMUSCULAR; INTRAVENOUS EVERY 5 MIN PRN
Status: DISCONTINUED | OUTPATIENT
Start: 2022-07-28 | End: 2022-07-28 | Stop reason: HOSPADM

## 2022-07-28 RX ORDER — MIDAZOLAM HYDROCHLORIDE 1 MG/ML
INJECTION INTRAMUSCULAR; INTRAVENOUS PRN
Status: DISCONTINUED | OUTPATIENT
Start: 2022-07-28 | End: 2022-07-28 | Stop reason: SDUPTHER

## 2022-07-28 RX ORDER — HYDRALAZINE HYDROCHLORIDE 20 MG/ML
10 INJECTION INTRAMUSCULAR; INTRAVENOUS
Status: DISCONTINUED | OUTPATIENT
Start: 2022-07-28 | End: 2022-07-28 | Stop reason: HOSPADM

## 2022-07-28 RX ORDER — HYDROMORPHONE HCL 110MG/55ML
PATIENT CONTROLLED ANALGESIA SYRINGE INTRAVENOUS PRN
Status: DISCONTINUED | OUTPATIENT
Start: 2022-07-28 | End: 2022-07-28 | Stop reason: SDUPTHER

## 2022-07-28 RX ORDER — PROCHLORPERAZINE EDISYLATE 5 MG/ML
5 INJECTION INTRAMUSCULAR; INTRAVENOUS
Status: DISCONTINUED | OUTPATIENT
Start: 2022-07-28 | End: 2022-07-28 | Stop reason: HOSPADM

## 2022-07-28 RX ORDER — ASPIRIN 325 MG
325 TABLET, DELAYED RELEASE (ENTERIC COATED) ORAL DAILY
Qty: 30 TABLET | Refills: 0 | Status: SHIPPED | OUTPATIENT
Start: 2022-07-28 | End: 2022-10-10

## 2022-07-28 RX ORDER — ONDANSETRON 2 MG/ML
INJECTION INTRAMUSCULAR; INTRAVENOUS PRN
Status: DISCONTINUED | OUTPATIENT
Start: 2022-07-28 | End: 2022-07-28 | Stop reason: SDUPTHER

## 2022-07-28 RX ORDER — MAGNESIUM HYDROXIDE 1200 MG/15ML
LIQUID ORAL CONTINUOUS PRN
Status: DISCONTINUED | OUTPATIENT
Start: 2022-07-28 | End: 2022-07-28 | Stop reason: HOSPADM

## 2022-07-28 RX ORDER — APREPITANT 40 MG/1
40 CAPSULE ORAL ONCE
Status: COMPLETED | OUTPATIENT
Start: 2022-07-28 | End: 2022-07-28

## 2022-07-28 RX ORDER — ONDANSETRON 2 MG/ML
4 INJECTION INTRAMUSCULAR; INTRAVENOUS
Status: DISCONTINUED | OUTPATIENT
Start: 2022-07-28 | End: 2022-07-28 | Stop reason: HOSPADM

## 2022-07-28 RX ORDER — PROPOFOL 10 MG/ML
INJECTION, EMULSION INTRAVENOUS PRN
Status: DISCONTINUED | OUTPATIENT
Start: 2022-07-28 | End: 2022-07-28 | Stop reason: SDUPTHER

## 2022-07-28 RX ORDER — SENNA PLUS 8.6 MG/1
1 TABLET ORAL 2 TIMES DAILY PRN
Qty: 20 TABLET | Refills: 0 | Status: SHIPPED | OUTPATIENT
Start: 2022-07-28 | End: 2022-08-11

## 2022-07-28 RX ORDER — ROPIVACAINE HYDROCHLORIDE 5 MG/ML
INJECTION, SOLUTION EPIDURAL; INFILTRATION; PERINEURAL PRN
Status: DISCONTINUED | OUTPATIENT
Start: 2022-07-28 | End: 2022-07-28 | Stop reason: ALTCHOICE

## 2022-07-28 RX ORDER — SODIUM CHLORIDE, SODIUM LACTATE, POTASSIUM CHLORIDE, CALCIUM CHLORIDE 600; 310; 30; 20 MG/100ML; MG/100ML; MG/100ML; MG/100ML
INJECTION, SOLUTION INTRAVENOUS CONTINUOUS
Status: DISCONTINUED | OUTPATIENT
Start: 2022-07-28 | End: 2022-07-28 | Stop reason: HOSPADM

## 2022-07-28 RX ORDER — OXYCODONE HYDROCHLORIDE AND ACETAMINOPHEN 5; 325 MG/1; MG/1
1 TABLET ORAL EVERY 6 HOURS PRN
Qty: 28 TABLET | Refills: 0 | Status: SHIPPED | OUTPATIENT
Start: 2022-07-28 | End: 2022-08-04

## 2022-07-28 RX ORDER — MEPERIDINE HYDROCHLORIDE 25 MG/ML
12.5 INJECTION INTRAMUSCULAR; INTRAVENOUS; SUBCUTANEOUS EVERY 5 MIN PRN
Status: DISCONTINUED | OUTPATIENT
Start: 2022-07-28 | End: 2022-07-28 | Stop reason: HOSPADM

## 2022-07-28 RX ORDER — LABETALOL HYDROCHLORIDE 5 MG/ML
10 INJECTION, SOLUTION INTRAVENOUS
Status: DISCONTINUED | OUTPATIENT
Start: 2022-07-28 | End: 2022-07-28 | Stop reason: HOSPADM

## 2022-07-28 RX ADMIN — PROPOFOL 30 MG: 10 INJECTION, EMULSION INTRAVENOUS at 09:08

## 2022-07-28 RX ADMIN — SODIUM CHLORIDE, POTASSIUM CHLORIDE, SODIUM LACTATE AND CALCIUM CHLORIDE: 600; 310; 30; 20 INJECTION, SOLUTION INTRAVENOUS at 08:52

## 2022-07-28 RX ADMIN — HYDROMORPHONE HYDROCHLORIDE 0.4 MG: 2 INJECTION, SOLUTION INTRAMUSCULAR; INTRAVENOUS; SUBCUTANEOUS at 08:27

## 2022-07-28 RX ADMIN — PHENYLEPHRINE HYDROCHLORIDE 50 MCG: 10 INJECTION INTRAVENOUS at 07:44

## 2022-07-28 RX ADMIN — PHENYLEPHRINE HYDROCHLORIDE 50 MCG: 10 INJECTION INTRAVENOUS at 08:14

## 2022-07-28 RX ADMIN — PROPOFOL 200 MG: 10 INJECTION, EMULSION INTRAVENOUS at 07:32

## 2022-07-28 RX ADMIN — FENTANYL CITRATE 50 MCG: 50 INJECTION, SOLUTION INTRAMUSCULAR; INTRAVENOUS at 07:57

## 2022-07-28 RX ADMIN — DEXAMETHASONE SODIUM PHOSPHATE 4 MG: 4 INJECTION, SOLUTION INTRAMUSCULAR; INTRAVENOUS at 07:32

## 2022-07-28 RX ADMIN — PHENYLEPHRINE HYDROCHLORIDE 50 MCG: 10 INJECTION INTRAVENOUS at 08:19

## 2022-07-28 RX ADMIN — FENTANYL CITRATE 25 MCG: 50 INJECTION, SOLUTION INTRAMUSCULAR; INTRAVENOUS at 07:25

## 2022-07-28 RX ADMIN — CEFAZOLIN 2000 MG: 2 INJECTION, POWDER, FOR SOLUTION INTRAMUSCULAR; INTRAVENOUS at 07:28

## 2022-07-28 RX ADMIN — FENTANYL CITRATE 25 MCG: 50 INJECTION, SOLUTION INTRAMUSCULAR; INTRAVENOUS at 07:37

## 2022-07-28 RX ADMIN — PHENYLEPHRINE HYDROCHLORIDE 50 MCG: 10 INJECTION INTRAVENOUS at 07:52

## 2022-07-28 RX ADMIN — PHENYLEPHRINE HYDROCHLORIDE 50 MCG: 10 INJECTION INTRAVENOUS at 08:32

## 2022-07-28 RX ADMIN — ONDANSETRON 4 MG: 2 INJECTION INTRAMUSCULAR; INTRAVENOUS at 07:32

## 2022-07-28 RX ADMIN — Medication 80 MG: at 07:32

## 2022-07-28 RX ADMIN — APREPITANT 40 MG: 40 CAPSULE ORAL at 07:13

## 2022-07-28 RX ADMIN — PHENYLEPHRINE HYDROCHLORIDE 50 MCG: 10 INJECTION INTRAVENOUS at 08:49

## 2022-07-28 RX ADMIN — PROPOFOL 20 MG: 10 INJECTION, EMULSION INTRAVENOUS at 09:11

## 2022-07-28 RX ADMIN — HYDROMORPHONE HYDROCHLORIDE 0.2 MG: 2 INJECTION, SOLUTION INTRAMUSCULAR; INTRAVENOUS; SUBCUTANEOUS at 08:46

## 2022-07-28 RX ADMIN — PROPOFOL 20 MG: 10 INJECTION, EMULSION INTRAVENOUS at 09:15

## 2022-07-28 RX ADMIN — SODIUM CHLORIDE, POTASSIUM CHLORIDE, SODIUM LACTATE AND CALCIUM CHLORIDE: 600; 310; 30; 20 INJECTION, SOLUTION INTRAVENOUS at 06:10

## 2022-07-28 RX ADMIN — MIDAZOLAM HYDROCHLORIDE 2 MG: 2 INJECTION, SOLUTION INTRAMUSCULAR; INTRAVENOUS at 07:25

## 2022-07-28 ASSESSMENT — PAIN - FUNCTIONAL ASSESSMENT: PAIN_FUNCTIONAL_ASSESSMENT: 0-10

## 2022-07-28 NOTE — H&P
Bianca Clinton    [id number]    [facility] Same Day Surgery Update H & P  Department of General Surgery   Surgical Service   Pre-operative History and Physical  Last H & P within the last 30 days. DIAGNOSIS:   Acute medial meniscus tear of left knee, initial encounter [S83.242A]    Procedure(s):  LEFT KNEE ARTHROSCOPY, MEDIAL MENISCUS, ROOT REPAIR, SYNOVECTOMY, CHONDROPLASTY     History obtained from: Patient interview and EHR     HISTORY OF PRESENT ILLNESS:   The patient is a 62 y.o. female with c/o left knee pain, swelling and tenderness in the setting of MRI demonstrated medial meniscal root tear with extrusion. Their symptoms have been recalcitrant to conservative treatment and the patient presents today for the above procedure. Illness Screening: Patient denies fever, chills, worsening cough, or close contact with sick individuals.        Past Medical History:        Diagnosis Date    Anxiety     Breast cancer [facility])     [facility]     Dr. Toribio Houser: pN0 January 2021: ER/AK+, HER2 Neg :  Aromatase Inhibitor tx (will need DEXA)    Hypercholesteremia     Obstructive sleep apnea (adult) (pediatric)     APAP 10-18 (ave 12 cwp)    PONV (postoperative nausea and vomiting)     Restless leg     Sleep apnea     uses cpap     Past Surgical History:        Procedure Laterality Date    ACHILLES TENDON SURGERY Right     BREAST BIOPSY Right 01/26/2021    RIGHT SENTINEL LYMPH NODE BIOPSY performed by Tee Paula DO at [address] [facility]  10/29/2020    BREAST REDUCTION SURGERY Bilateral 10/29/2020    BILATERAL BREAST REDUCTION AND SPY performed by Keith Kaur MD at [address]    COLONOSCOPY N/A 06/11/2020    COLORECTAL CANCER SCREENING, HIGH RISK performed by Sandra Hutson MD at [address] ([address] [place] [address])      HYSTERECTOMY, VAGINAL  2002    DUB:  Dr. Mary Mcgovern still in    [address] [place] [address] Medications Prior to Admission:      Prior to Admission medications    Medication Sig Start Date End Date Taking? Authorizing Provider   ondansetron (ZOFRAN-ODT) 4 MG disintegrating tablet Take 1 tablet by mouth 3 times daily as needed for Nausea or Vomiting 7/21/22   Eduar Costello MD   simvastatin (ZOCOR) 40 MG tablet TAKE ONE TABLET BY MOUTH NIGHTLY 7/15/22   Eduar Costello MD   carbidopa-levodopa (SINEMET)  MG per tablet TAKE 1 TABLET BY MOUTH ONE TIME A DAY 7/15/22   Eduar Costello MD   cyclobenzaprine (FLEXERIL) 10 MG tablet Take 1 tablet by mouth 2 times daily as needed for Muscle spasms 7/13/22   LUKASZ Singh   meloxicam (MOBIC) 15 MG tablet TAKE 1 TABLET BY MOUTH ONE TIME A DAY 7/7/22   Charley Fofana MD   venlafaxine (EFFEXOR XR) 75 MG extended release capsule TAKE 1 CAPSULE BY MOUTH ONE TIME A DAY 6/13/22   Eduar Costello MD   pramipexole (MIRAPEX) 1 MG tablet Take 1 tablet by mouth nightly 4/4/22   Eduar Costello MD   Multiple Vitamins-Minerals (MULTIVITAL PO) Take by mouth    Historical Provider, MD   cetirizine (ZYRTEC) 10 MG tablet Take 10 mg by mouth daily    Historical Provider, MD         Allergies:  Patient has no known allergies. PHYSICAL EXAM:      /84   Pulse 88   Temp 97.6 °F (36.4 °C) (Temporal)   Resp 16   Ht 5' 6\" (1.676 m)   Wt 193 lb (87.5 kg)   LMP  (LMP Unknown)   SpO2 95%   BMI 31.15 kg/m²      Airway:  Airway patent with no audible stridor    Heart:  Regular rate and rhythm, No murmur noted    Lungs:  No increased work of breathing, good air exchange, clear to auscultation bilaterally, no crackles or wheezing    Abdomen:  Soft, non-distended, non-tender, no masses palpated    ASSESSMENT AND PLAN    Patient is a 62 y.o. female with above specified procedure planned. 1.  The patients history and physical was obtained and signed off by the pre-admission testing department.   Patient seen and focused exam done today- no new changes since last physical exam on 7/21/22    2. Access to ancillary services are available per request of the provider.     TASH Tamez - TABBY     7/28/2022

## 2022-07-28 NOTE — PROGRESS NOTES
Pt arrived asleep no SS or CO pain report received from S CRNA and CRNA dilaudid used  in OR for pain control LEFT KNEE ARTHROSCOPY, MEDIAL MENISCUS, ROOT REPAIR, SYNOVECTOMY, CHONDROPLASTY- Left

## 2022-07-28 NOTE — PROGRESS NOTES
Ambulatory Surgery/Procedure Discharge Note    Vitals:    07/28/22 1032   BP: 115/77   Pulse: (!) 106   Resp: 16   Temp: 97 °F (36.1 °C)   SpO2: 93%       In: 1365 [P.O.:120; I.V.:1195]  Out: 10     Restroom use offered before discharge. Yes  Discharge instructions were read at bedside. Script altogether on chart. Pt is toileted and remains safe. Pain assessment:  none  Pain Level: 0        Patient discharged to home/self care.  Patient discharged via wheel chair by transporter and this nurse to waiting family/S.O.       7/28/2022 11:24 AM

## 2022-07-28 NOTE — PROGRESS NOTES
PACU Transfer to [facility]    Vitals:    07/28/22 1015   BP: 118/63   Pulse: (!) 103   Resp: 15   Temp: 97.2 °F (36.2 °C)   SpO2: 92%       PULSE MEETS PHASE ONE DC CRITERIA   Intake/Output Summary (Last 24 hours) at 7/28/2022 1028  Last data filed at 7/28/2022 1015  Gross per 24 hour   Intake 1365 ml   Output 10 ml   Net 1355 ml       Pain assessment:  headaches  Pain Level: 0    Patient transferred to care of [facility] RN.    7/28/2022 10:28 AM

## 2022-07-28 NOTE — DISCHARGE INSTRUCTIONS
Ronaldo Gregory MD     KNEE POST-OPERATIVE INSTRUCTIONS    DRESSING/WOUND CARE    Your incisions have been closed with absorbable sutures which will not need to be removed. In addition, they have been covered with Dermabond, which will shield them from water. You will be instructed at your first postoperative visit when you may shower. Your dressing includes gauze sponges immediately adjacent to the skin which are held in place with a layer of sterile cotton padding. Your leg has also been wrapped in an Ace bandage to provide compression and help to prevent swelling. Your dressing will be removed at your first physical therapy visit. You may remove and rewrap the Ace bandage if it feels uncomfortable or too tight. Some bleeding may be on the dressing after surgery. Call if the stain increases to more than 2 inches in diameter. KNEE IMMOBILIZER    Depending on your surgery, you may also have an immobilizer brace on your leg. This is primarily for your comfort. The straps may be loosened, but the brace must be worn when you are sleeping or walking. It may be removed by your physical therapist when you have good     CRUTCHES/JOINT MOVEMENT     No weightbearing, allow your toe to lightly touchdown when walking and Use knee immobilizer    STRAIGHT LEG RAISES    Perform 10 times every 30 minutes while awake. When you begin these exercises, it is normal to feel pain in the front of your knee. You are not causing any damage to the joint by doing these exercises. He will avoid losing muscle mass in your thigh and will hasten your recovery. The more straight leg raises you can perform, the easier and more comfortable they will be. CRYOCUFF ICEMAN COLD THERAPY UNIT/ICE PACK    Many patients have found that the use of the controlled cold therapy system offers improved recovery and rehab following surgery. The devices used immediately after surgery to reduce pain and swelling.   Most patients use the unit for several weeks following surgery. It can be used in conjunction with physical therapy, work, and exercising. The instructions are included with the device. You may use it 20 minutes out of every hour while you are awake. Remove if it is uncomfortable. You may apply ice packs to the shoulder area 20 minutes out of every hour while you are awake if not using the iceman unit. Remove if it is uncomfortable. NERVE BLOCK    At the hospital, prior to your procedure, you have the option of receiving a nerve block of the knee. This would then make your knee numb for up to 1 day and it may take a few days for your full sensation to return. ASPIRIN-325 MG-START AFTER SURGERY  Take 1 aspirin daily until you are able to walk normally. Aspirin is a mild blood thinner and is given to help prevent blood clots following surgery  ** DO NOT TAKE ASPIRIN IF YOU HAVE AN ALLERGY TO THIS MEDICATION OR IF YOU HAVE A HISTORY OF ANY BLEEDING DISORDER**    EDNA HOSE    White stockings have been provided for you to wear on the on operative leg to maintain blood flow and help avoid blood clots. You may discontinue use of the stockings when you are walking normally on the uninjured side. After the Ace bandage and dressing has been removed from your operated side, you can substitute the stocking for the Ace bandage when you are up and walking    BATHING    Keep your dressing dry. You may shower 5 days after your surgery. Avoid scrubbing incisions for 2 full weeks following her surgery. PAIN MEDICATION     Percocet 5/325m-2 tablets every 4-6 hours as needed for pain, Zofran 4m tablet every 8 hours as needed for nausea, and Senna 8.6m tablet every 12 hours as needed for constipation    You may take Motrin Advil or ibuprofen 1 to 2 tablets every 4-6 hours in addition to the above medication as needed.   ** IF YOU ARE TAKING TORADOL, DO NOT TAKE MOTRIN, ADVIL, OR IBUPROFEN**  **DO NOT TAKE EXTRA TYLENOL WHILE TAKING PERCOCET, [address], OR VICODIN. THESE MEDICATIONS ALSO CONTAIN TYLENOL. TAKING ADDITIONAL TYLENOL CAN CAUSE LIVER DAMAGE**    The pain medication may make you drowsy. Do not drink alcohol while taking pain medication. Do not operate a motor vehicle while taking pain medication. Nausea is a common side effect of narcotic pain medication. This can sometimes be helped by taking pain medication with food. You should drink plenty of water while taking pain medication. You should decrease the amount of medication you are taking each day following surgery. If your pain is not controlled by your pain medications or increases in severity after the first postoperative visit, please contact our office. CALL THE OFFICE IF ANY OF THE FOLLOWING OCCUR    TEMPERATURE ABOVE 101  DEVELOPMENT OF NEW NUMBNESS OR TINGLING  UNCONTROLLED NAUSEA OR VOMITING  EXCESSIVE BLEEDING  INCREASED PAIN  INABILITY TO URINATE    Adin Villalpando, Mao Teague PA-C, and a Surgical Fellow are the providers that will be involved in your care. If you have any questions, comments, or concerns please contact Eliza Aguayo, our , by calling [phone], or by emailing her at [email or web address] [facility]    There are potential side effects of anesthesia or sedation you may experience for the first 24 hours. These side effects include:    Confusion or Memory loss, Dizziness, or Delayed Reaction Times   [x]A responsible person should be with you for the next 24 hours. Do not operate any vehicles (automobiles, bicycles, motorcycles) or power tools or machinery for 24 hours. Do not sign any legal documents or make any legal decisions for 24 hours. Do not drink alcohol for 24 hours or while taking narcotic pain medication. Nausea    [x]Start with light diet and progress to your normal diet as you feel like eating.  However, if you experience nausea or repeated and pain around the area where you had surgery  Drainage of cloudy fluid from your surgical wound   Fever     Can SSIs be treated? Yes. Most surgical site infections can be treated with antibiotics. The antibiotic given to  you depends on the bacteria (germs) causing the infection. Sometimes patients with  SSIs also need another surgery to treat the infection. What are some of the things that hospitals are doing to prevent SSIs? To prevent SSIs, doctors, nurses and other healthcare providers:   Clean their hands and arms up to their elbows with an antiseptic agent just before the surgery. Clean their hands with soap and water or an alcohol-based hand rub before and after caring for each patient. May remove some of your hair immediately before your surgery using electric clippers if the hair is in the same area where the procedure will occur. They should not shave you with a razor. Wear special hair covers, masks, gowns, and gloves during surgery to keep the surgery area clean. Give you antibiotics before your surgery starts. In most cases, you should get antibiotics within 60 minutes before the surgery starts and the antibiotics should be stopped within 24 hours after surgery. Clean the skin at the site of your surgery with a special soap that kills germs. What can I do to help prevent SSIs? Before your surgery:  Tell your doctor about other medical problems you may have. Health problems such as allergies, diabetes, and obesity could affect your surgery and your treatment. Quit smoking. Patients who smoke get more infections. Talk to your doctor about how you can quit before your surgery. Do not shave near where you will have surgery. Shaving with a razor can irritate your skin and make it easier to develop an infection. At the time of your surgery:  Speak up if someone tries to shave you with a razor before surgery.   Ask why you need to be shaved and talk with your surgeon if you have any concerns. Ask if you will get antibiotics before surgery. After your surgery:  Make sure that your healthcare providers clean their hands before examining you, either with soap and water or an alcohol-based hand rub. IF YOU DO NOT SEE YOUR PROVIDERS CLEAN THEIR HANDS, PLEASE ASK THEM TO DO SO. Family and friends who visit you should not touch the surgical wound or dressings. Family and friends should clean their hands with soap and water or an alcohol-based hand rub before and after visiting you. If you do not see them clean their hands, ask them to clean their hands. What do I need to do when I go home from the hospital?  Before you go home, your doctor nurses should explain everything you need to know about taking care of your wound. Make sure you understand how to care for your wound before you leave the hospital.    Always clean your hands before and after caring for your wound. Before you go home, make sure you know who to contact if you have questions or problems after you get home. If you have any symptoms of an infection, such as redness and pain at the surgery site, drainage, or fever, call your doctor immediately. If you have additional questions, please ask your doctor or nurse.

## 2022-07-28 NOTE — ANESTHESIA PRE PROCEDURE
Ermalinda Dayton,  mL/hr at 07/28/22 0651 NoRateChange at 07/28/22 0651    sodium chloride flush 0.9 % injection 5-40 mL  5-40 mL IntraVENous 2 times per day Janece Ignacio, DO        sodium chloride flush 0.9 % injection 5-40 mL  5-40 mL IntraVENous PRN Janece Ignacio, DO        0.9 % sodium chloride infusion   IntraVENous PRN Janece Ignacio, DO           Allergies:  No Known Allergies    Problem List:    Patient Active Problem List   Diagnosis Code    Depression F32. A    Hypercholesteremia E78.00    Achilles tendon rupture S86.019A    Lumbago M54.50    Obstructive sleep apnea G47.33    Tobacco abuse Z72.0    Rosacea L71.9    Periorificial dermatitis L71.0    Multiple benign melanocytic nevi D22.9    Breast cancer, right breast ([facility] C50.911       Past Medical History:        Diagnosis Date    Anxiety     [facility]     [facility] Cancer - right breast ([facility]     Dr. Kurt Sabillon: pN0 January 2021: ER/MS+, HER2 Neg :  Aromatase Inhibitor tx (will need DEXA)    Hypercholesteremia     Obstructive sleep apnea (adult) (pediatric)     APAP 10-18 (ave 12 cwp)    PONV (postoperative nausea and vomiting)     Restless leg     Sleep apnea     uses cpap       Past Surgical History:        Procedure Laterality Date    ACHILLES TENDON SURGERY Right     BREAST BIOPSY Right 01/26/2021    RIGHT SENTINEL LYMPH NODE BIOPSY performed by Julio César Hill DO at [address] [place] [address]  10/29/2020    BREAST REDUCTION SURGERY Bilateral 10/29/2020    BILATERAL BREAST REDUCTION AND SPY performed by Viola Evangelista MD at [address] [facility] COLONOSCOPY N/A 06/11/2020    COLORECTAL CANCER SCREENING, HIGH RISK performed by Rachel Mccarty MD at [facility] ([address] [place] [address])      HYSTERECTOMY, VAGINAL  2002    DUB:  Dr. Kenia Logan still in   [address] [place] [address] History:    Social History     Tobacco Use    Smoking status: Former Packs/day: 0.25     Years: 22.00     Pack years: 5.50     Types: Cigarettes     Quit date: 2018     Years since quittin.0    Smokeless tobacco: Never    Tobacco comments:     contemplative, in process of quitting , discussed with patient   Substance Use Topics    Alcohol use: Yes     Comment: occasionally                                Counseling given: Not Answered  Tobacco comments: contemplative, in process of quitting , discussed with patient      Vital Signs (Current): There were no vitals filed for this visit.                                            BP Readings from Last 3 Encounters:   22 111/84   22 106/75   22 112/79       NPO Status:                                                                                 BMI:   Wt Readings from Last 3 Encounters:   22 193 lb (87.5 kg)   22 190 lb (86.2 kg)   22 193 lb (87.5 kg)     There is no height or weight on file to calculate BMI.    CBC:   Lab Results   Component Value Date/Time    WBC 5.9 2021 03:36 PM    RBC 4.62 2021 03:36 PM    HGB 14.4 2021 03:36 PM    HCT 42.6 2021 03:36 PM    MCV 92.3 2021 03:36 PM    RDW 12.9 2021 03:36 PM     2021 03:36 PM       CMP:   Lab Results   Component Value Date/Time     2022 08:33 AM    K 5.0 2022 08:33 AM     2022 08:33 AM    CO2 28 2022 08:33 AM    BUN 25 2022 08:33 AM    CREATININE 0.9 2022 08:33 AM    GFRAA >60 2022 08:33 AM    GFRAA >60 2013 12:31 PM    AGRATIO 1.8 2022 08:33 AM    LABGLOM >60 2022 08:33 AM    GLUCOSE 95 2022 08:33 AM    PROT 6.7 2022 08:33 AM    PROT 6.9 2012 02:14 PM    CALCIUM 9.1 2022 08:33 AM    BILITOT 0.3 2022 08:33 AM    ALKPHOS 93 2022 08:33 AM    AST 24 2022 08:33 AM    ALT 16 2022 08:33 AM       POC Tests: No results for input(s): POCGLU, POCNA, POCK, POCCL, POCBUN, Jeet Remedies in the last 72 hours. Coags:   Lab Results   Component Value Date/Time    PROTIME 11.4 10/02/2020 08:08 AM    INR 0.98 10/02/2020 08:08 AM    APTT 36.8 10/02/2020 08:08 AM       HCG (If Applicable): No results found for: PREGTESTUR, PREGSERUM, HCG, HCGQUANT     ABGs: No results found for: PHART, PO2ART, WZH0MFE, OSF0PVM, BEART, P2EJCCFU     Type & Screen (If Applicable):  No results found for: LABABO, LABRH    Drug/Infectious Status (If Applicable):  Lab Results   Component Value Date/Time    HEPCAB Non-Reactive (Negative) 03/20/2013 02:36 PM       COVID-19 Screening (If Applicable):   Lab Results   Component Value Date/Time    COVID19 Not Detected 01/21/2021 01:59 PM    COVID19 NOT DETECTED 10/23/2020 02:57 PM         Anesthesia Evaluation  Patient summary reviewed and Nursing notes reviewed   history of anesthetic complications: PONV. Airway: Mallampati: II  TM distance: >3 FB   Neck ROM: full  Mouth opening: > = 3 FB   Dental: normal exam         Pulmonary:normal exam  breath sounds clear to auscultation  (+) sleep apnea: on CPAP,                             Cardiovascular:Negative CV ROS  Exercise tolerance: good (>4 METS),           Rhythm: regular  Rate: normal                    Neuro/Psych:   (+) neuromuscular disease:, psychiatric history: stable with treatmentdepression/anxiety             GI/Hepatic/Renal: Neg GI/Hepatic/Renal ROS            Endo/Other: Negative Endo/Other ROS   (+) malignancy/cancer. Abdominal:       Abdomen: soft. Vascular: negative vascular ROS. Other Findings:             Anesthesia Plan      general     ASA 3     (  )  Induction: intravenous. MIPS: Postoperative opioids intended and Prophylactic antiemetics administered. Anesthetic plan and risks discussed with patient. Plan discussed with CRNA.     Attending anesthesiologist reviewed and agrees with Pre Eval content and Attending anesthesiologist reviewed and agrees with Preprocedure content          Patrick Grace, DO   7/28/2022

## 2022-07-28 NOTE — ANESTHESIA POSTPROCEDURE EVALUATION
Department of Anesthesiology  Postprocedure Note    Patient: Bianca Bee  MRN: [id number]  YOB: 1965  Date of evaluation: 7/28/2022      Procedure Summary     Date: 07/28/22 Room / Location: [facility] [address] [place] [address] / [address]    Anesthesia Start: 0725 Anesthesia Stop: 0930    Procedure: LEFT KNEE ARTHROSCOPY, MEDIAL MENISCUS, ROOT REPAIR, SYNOVECTOMY, CHONDROPLASTY (Left: Knee) Diagnosis:       Acute medial meniscus tear of left knee, initial encounter      (Acute medial meniscus tear of left knee, initial encounter [I93.635T])    Surgeons:  Antonio Gonzales MD Responsible Provider: Smita Lowry DO    Anesthesia Type: General ASA Status: 3          Anesthesia Type: General    Hortensia Phase I: Hortensia Score: 8    Hortensia Phase II:        Anesthesia Post Evaluation    Patient location during evaluation: PACU  Patient participation: complete - patient participated  Level of consciousness: awake  Pain score: 1  Airway patency: patent  Nausea & Vomiting: no nausea and no vomiting  Complications: no  Cardiovascular status: hemodynamically stable  Respiratory status: acceptable  Hydration status: stable  Multimodal analgesia pain management approach

## 2022-07-28 NOTE — OP NOTE
made.  Scope was placed in the joint. Knee was  visualized sequentially. The articular cartilage surfaces showed  evidence of grade 2B and 2A change over the medial femoral condyle with  loose fragments of articular cartilage. These were debrided using a  synovial shaver. A corresponding, but smaller area of grade 2A changes  present over the medial tibial plateau. Medial meniscus was noted to be  displaced and a clean tear of the meniscal root was appreciated. The  lateral meniscus was appeared to be intact. The ACL was intact,  significant synovitis was present throughout the medial.  Medial and  lateral synovectomies were carried out using synovial shaver. Synovectomies were also performed anteriorly using synovial shaver. We  felt that this meniscal root was amenable to repair. So, using a  curette, debrided the articular cartilage at the root attachment site. I then passed two nonabsorbable sutures in a locking-style configuration  through the meniscal root and then made an incision over the anterior  medial aspect of the knee. Using a drill guide, I placed a pin from  just proximal to the pes insertion to the previously identified spot for  root attachment. The sutures were passed through this drill hole and  tied down over button and secured using a PushLock suture anchor. This  supported excellent reduction of the meniscal root down to the bone. The tourniquet was then deflated. Hemostasis was achieved using  electrocautery. Incision was closed with Monocryl sutures. Sterile  dressing was applied. The patient was awakened and taken to the  recovery room in stable condition. The sponge and needle counts were  correct at the conclusion of the procedure and blood loss was minimal.    Billy Fonseca PA-C, was present as a surgical assistant due to the  complexity of the case.   Her assistance was necessary for limb  positioning as well as suture management, instrument positioning and  multilayer wound closure.         Iman York MD    D: 07/28/2022 9:45:09       T: 07/28/2022 10:47:59     [email or web address]  Job#: [id number]     Doc#: [id number]    CC:

## 2022-07-29 ENCOUNTER — HOSPITAL ENCOUNTER (OUTPATIENT)
Dept: PHYSICAL THERAPY | Age: 57
Setting detail: THERAPIES SERIES
Discharge: HOME | End: 2022-07-29
Payer: COMMERCIAL

## 2022-07-29 ENCOUNTER — OFFICE VISIT (OUTPATIENT)
Dept: ORTHOPEDIC SURGERY | Age: 57
End: 2022-07-29

## 2022-07-29 DIAGNOSIS — Z98.890 S/P LEFT KNEE ARTHROSCOPY: Primary | ICD-10-CM

## 2022-07-29 DIAGNOSIS — S83.232D COMPLEX TEAR OF MEDIAL MENISCUS OF LEFT KNEE AS CURRENT INJURY, SUBSEQUENT ENCOUNTER: ICD-10-CM

## 2022-07-29 PROCEDURE — 97161 PT EVAL LOW COMPLEX 20 MIN: CPT

## 2022-07-29 PROCEDURE — 97110 THERAPEUTIC EXERCISES: CPT

## 2022-07-29 PROCEDURE — 99024 POSTOP FOLLOW-UP VISIT: CPT | Performed by: PHYSICIAN ASSISTANT

## 2022-07-29 PROCEDURE — 97016 VASOPNEUMATIC DEVICE THERAPY: CPT

## 2022-07-29 PROCEDURE — 97112 NEUROMUSCULAR REEDUCATION: CPT

## 2022-07-29 NOTE — PROGRESS NOTES
Chief Complaint  Post-Op Check (Left knee )      History of Present Illness:  Tomas Osei is a pleasant 62 y.o. female here for f/u regarding her left knee. She is 1 day s/p Left knee arthroscopy with major synovectomy, chondroplasty medial femoral  condyle, and medial meniscus root repair using accessory  incision. Doing well with no concerns. Denies any fevers, chills, nausea vomiting. No residual numbness or tingling of the left leg. Medical History:  Patient's medications, allergies, past medical, surgical, social and family histories were reviewed and updated as appropriate. ROS: Review of systems reviewed from Patient History Form completed today and available in the patient's chart under the Media tab. Pertinent items are noted in HPI  Review of systems reviewed from Patient History Form completed today and available in the patient's chart under the Media tab. Vital Signs:  LMP  (LMP Unknown)     left knee exam    Gait: No use of assistive devices. No antalgic gait. Alignment: normal alignment. Inspection/skin: Incisions healing well. No indication of infection. No dehiscence. No drainage. No diffuse erythema. Palpation: Non tender to light touch    Range of Motion: There is full range of motion. Strength: Normal quadriceps development. Effusion: Minimal effusion    Ligamentous stability: No gross instability. Neurologic and vascular:  Calf soft and nontender. Skin is warm and well-perfused. Sensation is intact to light-touch. Radiology:       Pertinent imaging was interpreted and reviewed with the patient today, images only - no report available. No new imaging was obtained during today's visit. Assessment : 1 day s/p Left knee arthroscopy with major synovectomy, chondroplasty medial femoral  condyle, and medial meniscus root repair using accessory  incision. Impression:  Encounter Diagnoses   Name Primary?     Complex tear of medial meniscus of left knee as current injury, subsequent encounter     S/P left knee arthroscopy Yes       Office Procedures:  Orders Placed This Encounter   Procedures    [facility]     Referral Priority:   Routine     Referral Type:   Eval and Treat     Referral Reason:   Specialty Services Required     Requested Specialty:   Physical Therapist     Number of Visits Requested:   1         Plan: Pertinent imaging was reviewed. The etiology, natural history, and treatment options for the disorder were discussed. The roles of activity medication, antiinflammatories, injections, bracing, physical therapy, and surgical interventions were all described to the patient and questions were answered. Patient is doing very well for 1 day postop. I would like her to start working with formal supervised physical therapy following a root repair protocol. She would like to proceed with this. Nonweightbearing for the time being. F/u in one month or sooner if worsening symptoms  Kwan Aguirre is in agreement with this plan. All questions were answered to patient's satisfaction and was encouraged to call with any further questions. Jesseniacurtis Lizarraga, [address] [place] [address]  7/29/2022    This dictation was performed with a verbal recognition program [facility]) and it was checked for errors. It is possible that there are still dictated errors within this office note. If so, please bring any areas to my attention for an addendum. All efforts were made to ensure that this office note is accurate.

## 2022-07-29 NOTE — FLOWSHEET NOTE
The [address] [place] [address],[address], [address], [address] [place] [address]  Phone: [phone]   Fax:     [phone]    Physical Therapy Daily Treatment Note  Date:  2022    Patient Name:  Maribell Servin    :  1965  MRN: [id number]  Restrictions/Precautions:    Medical/Treatment Diagnosis Information:  Diagnosis: S83.232D (ICD-10-CM) - Complex tear of medial meniscus, current injury, left knee, subsequent encounter  Treatment Diagnosis: M25.562 Left Knee Pain  Insurance/Certification information:  PT Insurance Information: [address] [facility]  Physician Information:   Nettie Mejía MD  Has the plan of care been signed (Y/N):        []  Yes  [x]  No     Date of Patient follow up with Physician:       Is this a Progress Report:     []  Yes  [x]  No        If Yes:  Date Range for reporting period:  Beginning 22  Ending 22    Progress report will be due (10 Rx or 30 days whichever is less):        Recertification will be due (POC Duration  / 90 days whichever is less): 22         Visit # Insurance Allowable Auth Required   1   [place] []  Yes [x]  No        Functional Scale: FOTO: 27    Date assessed:  22       Latex Allergy:  [x]NO      []YES  Preferred Language for Healthcare:   [x]English       []other:    Pain level:  1-4/10     SUBJECTIVE:  See eval    OBJECTIVE:   Flexibility   L R Comment   Hamstring Moderate restriction     Gastroc      ITB      Quad              ROM   PROM AROM Overpressure Comment    L R L R L R    Flexion   40 + pain present    Under 90 degrees   Extension   0                               Strength   *Not tested due to recent surgery L R Comment   Quad Good contraction present     Hamstring      Gastroc      Hip  flexion      Hip abd                      Special Test    *Not tested due to recent surgery Results/Comment   Meniscal Click    Crepitus    Flexion Test    Valgus Laxity Varus Laxity    Lachmans    Drop Back    Homans negative           Girth L R   Mid Patella 45.5 cm 42.5 cm   Suprapatellar 46.5 cm 45.5 cm   5cm above 49 cm 48.5 cm   15cm above       Reflexes/Sensation: 7/29    [x]Dermatomes/Myotomes intact    []Reflexes equal and normal bilaterally   []Other:    Joint mobility: 7/29  *Not tested due to recent surgery    []Normal    []Hypo   []Hyper    Palpation: Denies TTP  7/29    Functional Mobility/Transfers: Requires use of bilateral crutches for ADLs secondary to recent surgical procedure; unable to participate in recreational activities   7/29    Posture: WNL  7/29    Bandages/Dressings/Incisions: Bandages removed; no signs or symptoms of infection  7/29    Gait: (include devices/WB status) NWB with use of bilateral crutches  7/29                       [x] Patient history, allergies, meds reviewed. Medical chart reviewed. See intake form. 7/29    Review Of Systems (ROS):  [x]Performed Review of systems (Integumentary, CardioPulmonary, Neurological) by intake and observation. Intake form has been scanned into medical record. Patient has been instructed to contact their primary care physician regarding ROS issues if not already being addressed at this time.   7/29    RESTRICTIONS/PRECAUTIONS: L Meniscal Root Repair 7/28/22    Exercises/Interventions:   Exercise/Equipment Resistance/Repetitions Other comments   Stretching     Hamstring 30 sec x 5    Towel Pull 30 sec x 5    Inclined Calf     Hip Flexion     ITB     Groin     Quad                    SLR     Supine  NPV   Abduction  NPV   Adduction     Prone     SLR+          Isometrics     Quad sets 10 sec x 10    Hip adduction sets 10 sec x 10    Patellar Glides     Medial     Superior     Inferior          ROM     Sheet Pulls 10 sec x 5    Hang Weights     Passive     Active     Weight Shift     Ankle Pumps 30x every hour                   CKC     Calf raises     Wall sits     Step ups     1 leg stand     Squatting     CC TKE Balance     bridges          PRE     Extension  RANGE:   Flexion  RANGE:        Quantum machines     Leg press      Leg extension     Leg curl          Manual interventions                     Therapeutic Exercise and NMR EXR  [x] (98955) Provided verbal/tactile cueing for activities related to strengthening, flexibility, endurance, ROM for improvements in LE, proximal hip, and core control with self care, mobility, lifting, ambulation.  [] (93374) Provided verbal/tactile cueing for activities related to improving balance, coordination, kinesthetic sense, posture, motor skill, proprioception  to assist with LE, proximal hip, and core control in self care, mobility, lifting, ambulation and eccentric single leg control.      NMR and Therapeutic Activities:    [] (37483 or 06705) Provided verbal/tactile cueing for activities related to improving balance, coordination, kinesthetic sense, posture, motor skill, proprioception and motor activation to allow for proper function of core, proximal hip and LE with self care and ADLs  [] (86780) Gait Re-education- Provided training and instruction to the patient for proper LE, core and proximal hip recruitment and positioning and eccentric body weight control with ambulation re-education including up and down stairs     Home Exercise Program:    [x] (88917) Reviewed/Progressed HEP activities related to strengthening, flexibility, endurance, ROM of core, proximal hip and LE for functional self-care, mobility, lifting and ambulation/stair navigation   [] (00235)Reviewed/Progressed HEP activities related to improving balance, coordination, kinesthetic sense, posture, motor skill, proprioception of core, proximal hip and LE for self care, mobility, lifting, and ambulation/stair navigation      Manual Treatments:  PROM / STM / Oscillations-Mobs:  G-I, II, III, IV (PA's, Inf., Post.)  [] (12386) Provided manual therapy to mobilize LE, proximal hip and/or LS spine soft tissue/joints for the purpose of modulating pain, promoting relaxation,  increasing ROM, reducing/eliminating soft tissue swelling/inflammation/restriction, improving soft tissue extensibility and allowing for proper ROM for normal function with self care, mobility, lifting and ambulation. Modalities:      Charges:  Timed Code Treatment Minutes: 30 + EVAL   Total Treatment Minutes: 80       [x] EVAL (LOW) 90469 (typically 20 minutes face-to-face)  [] EVAL (MOD) 97463 (typically 30 minutes face-to-face)  [] EVAL (HIGH) 27404 (typically 45 minutes face-to-face)  [] RE-EVAL   [x] RI(43134) x 1     [] IONTO  [x] NMR (39910) x 1     [x] VASO  7/29  [] Manual (13390) x      [] Other:  [] TA x      [] Mech Traction (65564)  [] ES(attended) (88710)      [] ES (un) (97282):     GOALS:   Patient stated goal: Return to playing golf; Be able to walk and swim    [] Progressing: [] Met: [] Not Met: [] Adjusted    Therapist goals for Patient:   Short Term Goals: To be achieved in: 2 weeks  1. Independent in HEP and progression per patient tolerance, in order to prevent re-injury. [] Progressing: [] Met: [] Not Met: [] Adjusted   2. Patient will have a decrease in pain to facilitate improvement in movement, function, and ADLs as indicated by Functional Deficits. [] Progressing: [] Met: [] Not Met: [] Adjusted    Long Term Goals: To be achieved in: 16 weeks  1. Disability index score of 55 on FOTO to assist with reaching prior level of function. [] Progressing: [] Met: [] Not Met: [] Adjusted  2. Patient will demonstrate increased AROM to WNL to allow for proper joint functioning as indicated by patients Functional Deficits. [] Progressing: [] Met: [] Not Met: [] Adjusted  3. Patient will demonstrate an increase in Strength to good proximal hip strength and control  in LE to allow for proper functional mobility as indicated by patients Functional Deficits. [] Progressing: [] Met: [] Not Met: [] Adjusted  4.  Patient will return to Independent functional activities without increased symptoms or restriction. [] Progressing: [] Met: [] Not Met: [] Adjusted  5. Patient will reports returning to recreational activities with no restriction. [] Progressing: [] Met: [] Not Met: [] Adjusted     Overall Progression Towards Functional goals/ Treatment Progress Update:  [] Patient is progressing as expected towards functional goals listed. [] Progression is slowed due to complexities/Impairments listed. [] Progression has been slowed due to co-morbidities. [x] Plan just implemented, too soon to assess goals progression <30days   [] Goals require adjustment due to lack of progress  [] Patient is not progressing as expected and requires additional follow up with physician  [] Other    Prognosis for POC: [x] Good [] Fair  [] Poor      Patient requires continued skilled intervention: [x] Yes  [] No    Treatment/Activity Tolerance:  [x] Patient able to complete treatment  [] Patient limited by fatigue  [] Patient limited by pain     [] Patient limited by other medical complications  [] Other:     Patient Education:                  PLAN: See eval  [] Continue per plan of care [] Alter current plan (see comments above)  [x] Plan of care initiated [] Hold pending MD visit [] Discharge      Electronically signed by:  Arron Lopez, PT, DPT    Note: If patient does not return for scheduled/ recommended follow up visits, this note will serve as a discharge from care along with most recent update on progress.

## 2022-07-29 NOTE — PLAN OF CARE
The [address] [place] [address]  Phone [phone]  Fax [phone]                                                       Physical Therapy Certification    Dear  ,    We had the pleasure of evaluating the following patient for physical therapy services at [address] [place] [address]. A summary of our findings can be found in the initial assessment below. This includes our plan of care. If you have any questions or concerns regarding these findings, please do not hesitate to contact me at the office phone number checked above. Thank you for the referral.       Physician Signature:_______________________________Date:__________________  By signing above (or electronic signature), therapists plan is approved by physician      Patient: Bianca Bee   : 1965   MRN: [id number]  Referring Physician:  Martin Gonzalez MD      Evaluation Date: 2022      Medical Diagnosis Information:  Diagnosis: C04.826I (ICD-10-CM) - Complex tear of medial meniscus, current injury, left knee, subsequent encounter   Treatment Diagnosis: M25.562 Left Knee Pain                                         Insurance information: PT Insurance Information: [facility]     Precautions/ Contra-indications:     C-SSRS Triggered by Intake questionnaire (Past 2 wk assessment):   [x] No, Questionnaire did not trigger screening.   [] Yes, Patient intake triggered further evaluation      [] C-SSRS Screening completed  [] PCP notified via Plan of Care  [] Emergency services notified     Latex Allergy:  [x]NO      []YES  Preferred Language for Healthcare:   [x]English       []other:    SUBJECTIVE: Patient stated complaint:Patient is a 62year old female who presents to the clinic status-post L medial meniscus root repair 22.   OPERATIONS:  Left knee arthroscopy with major synovectomy, chondroplasty medial femoral condyle, and medial meniscus root repair using accessory incision. Relevant Medical History:History of breast cancer  Functional Disability Index: FOTO: 27    Pain Scale: 1-4/10  Easing factors: Resting; icing; medication  Provocative factors:      Type: []Constant   []Intermittent  []Radiating []Localized []other:     Numbness/Tingling: Denies    Occupation/School: [employer]    Living Status/Prior Level of Function: Independent with ADLs and IADLs,     OBJECTIVE:      Flexibility  7/29 L R Comment   Hamstring Moderate restriction     Gastroc      ITB      Quad              ROM  7/29 PROM AROM Overpressure Comment    L R L R L R    Flexion   40 + pain present    Under 90 degrees   Extension   0                               Strength 7/29  *Not tested due to recent surgery L R Comment   Quad Good contraction present     Hamstring      Gastroc      Hip  flexion      Hip abd                      Special Test  7/29  *Not tested due to recent surgery Results/Comment   Meniscal Click    Crepitus    Flexion Test    Valgus Laxity    Varus Laxity    Lachmans    Drop Back    Homans negative           Girth L R   Mid Patella 45.5 cm 42.5 cm   Suprapatellar 46.5 cm 45.5 cm   5cm above 49 cm 48.5 cm   15cm above       Reflexes/Sensation: 7/29    [x]Dermatomes/Myotomes intact    []Reflexes equal and normal bilaterally   []Other:    Joint mobility: 7/29  *Not tested due to recent surgery    []Normal    []Hypo   []Hyper    Palpation: Denies TTP  7/29    Functional Mobility/Transfers: Requires use of bilateral crutches for ADLs secondary to recent surgical procedure; unable to participate in recreational activities   7/29    Posture: WNL  7/29    Bandages/Dressings/Incisions: Bandages removed; no signs or symptoms of infection  7/29    Gait: (include devices/WB status) NWB with use of bilateral crutches  7/29                       [x] Patient history, allergies, meds reviewed. Medical chart reviewed. See intake form. 7/29    Review Of Systems (ROS):  [x]Performed Review of systems (Integumentary, CardioPulmonary, Neurological) by intake and observation. Intake form has been scanned into medical record. Patient has been instructed to contact their primary care physician regarding ROS issues if not already being addressed at this time. 7/29    Co-morbidities/Complexities (which will affect course of rehabilitation):   []None           Arthritic conditions   []Rheumatoid arthritis (M05.9)  []Osteoarthritis (M19.91)   Cardiovascular conditions   []Hypertension (I10)  []Hyperlipidemia (E78.5)  []Angina pectoris (I20)  []Atherosclerosis (I70)   Musculoskeletal conditions   []Disc pathology   []Congenital spine pathologies   []Prior surgical intervention  []Osteoporosis (M81.8)  []Osteopenia (M85.8)   Endocrine conditions   []Hypothyroid (E03.9)  []Hyperthyroid Gastrointestinal conditions   []Constipation (S40.08)   Metabolic conditions   []Morbid obesity (E66.01)  []Diabetes type 1(E10.65) or 2 (E11.65)   []Neuropathy (G60.9)     Pulmonary conditions   []Asthma (J45)  []Coughing   []COPD (J44.9)   Psychological Disorders  []Anxiety (F41.9)  []Depression (F32.9)   []Other:   [x]Other: Hx of cancer         Barriers to/and or personal factors that will affect rehab potential:              [x]Age  [x]Sex              [x]Motivation/Lack of Motivation                        []Co-Morbidities              []Cognitive Function, education/learning barriers              []Environmental, home barriers              []profession/work barriers  [x]past PT/medical experience  []other:  Justification:     Falls Risk Assessment (30 days):   [x] Falls Risk assessed and no intervention required. [] Falls Risk assessed and Patient requires intervention due to being higher risk   TUG score (>12s at risk):     [] Falls education provided, including       G-Codes:    FOTO: 27    ASSESSMENT:   Functional Impairments: []Noted lumbar/proximal hip/LE hypomobility   [x]Decreased LE functional ROM   [x]Decreased core/proximal hip strength and neuromuscular control   [x]Decreased LE functional strength   [x]Reduced balance/proprioceptive control   []other:      Functional Activity Limitations (from functional questionnaire and intake)   [x]Reduced ability to tolerate prolonged functional positions   [x]Reduced ability or difficulty with changes of positions or transfers between positions   [x]Reduced ability to maintain good posture and demonstrate good body mechanics with sitting, bending, and lifting   [x]Reduced ability to sleep   [x] Reduced ability or tolerance with driving and/or computer work   [x]Reduced ability to perform lifting, carrying tasks   [x]Reduced ability to squat   [x]Reduced ability to forward bend   [x]Reduced ability to ambulate prolonged functional periods/distances/surfaces   [x]Reduced ability to ascend/descend stairs   [x]Reduced ability to run, hop or jump   []other:     Participation Restrictions   [x]Reduced participation in self care activities   [x]Reduced participation in home management activities   [x]Reduced participation in work activities   [x]Reduced participation in social activities. []Reduced participation in sport activities. Classification :    [x]Signs/symptoms consistent with post-surgical status including decreased ROM, strength and function.    []Signs/symptoms consistent with joint sprain/strain   []Signs/symptoms consistent with patella-femoral syndrome   []Signs/symptoms consistent with knee OA/hip OA   []Signs/symptoms consistent with internal derangement of knee/Hip   []Signs/symptoms consistent with functional hip weakness/NMR control      []Signs/symptoms consistent with tendinitis/tendinosis    []signs/symptoms consistent with pathology which may benefit from Dry needling      []other:      Prognosis/Rehab Potential: []Excellent   [x]Good    []Fair   []Poor    Tolerance of evaluation/treatment:    []Excellent   [x]Good    []Fair   []Poor    Physical Therapy Evaluation Complexity Justification  [x] A history of present problem with:  [] no personal factors and/or comorbidities that impact the plan of care;  [x]1-2 personal factors and/or comorbidities that impact the plan of care  []3 personal factors and/or comorbidities that impact the plan of care  [x] An examination of body systems using standardized tests and measures addressing any of the following: body structures and functions (impairments), activity limitations, and/or participation restrictions;:  [] a total of 1-2 or more elements   [x] a total of 3 or more elements   [] a total of 4 or more elements   [x] A clinical presentation with:  [x] stable and/or uncomplicated characteristics   [] evolving clinical presentation with changing characteristics  [] unstable and unpredictable characteristics;   [x] Clinical decision making of [x] low, [] moderate, [] high complexity using standardized patient assessment instrument and/or measurable assessment of functional outcome. [x] EVAL (LOW) 61570 (typically 20 minutes face-to-face)  [] EVAL (MOD) 57727 (typically 30 minutes face-to-face)  [] EVAL (HIGH) 83233 (typically 45 minutes face-to-face)  [] RE-EVAL       PLAN  Frequency/Duration:  1-2 days per week for 16 Weeks:  Interventions:  [x]  Therapeutic exercise including: strength training, ROM, for Lower extremity and core   [x]  NMR activation and proprioception for LE, Glutes and Core   [x]  Manual therapy as indicated for LE, Hip and spine to include: Dry Needling/IASTM, STM, PROM, Gr I-IV mobilizations, manipulation. [x] Modalities as needed that may include: thermal agents, E-stim, Biofeedback, US, iontophoresis as indicated  [x] Patient education on joint protection, postural re-education, activity modification, progression of HEP.     HEP instruction: Access Code: [facility]  URL: [email or web address]  Date: 07/29/2022  Prepared by: Speedy Oneil    Exercises  Long Sitting Ankle Pumps - 1 x daily - 7 x weekly - 3 sets - 10 reps  Long Sitting Calf Stretch with Strap - 1 x daily - 7 x weekly - 1 sets - 5 reps - 30 hold  Seated Table Hamstring Stretch - 1 x daily - 7 x weekly - 1 sets - 5 reps - 30 hold  Supine Straight Leg Hip Adduction and Quad Set with Ball - 1 x daily - 7 x weekly - 1 sets - 10 reps - 10 hold  Long Sitting Quad Set - 1 x daily - 7 x weekly - 1 sets - 10 reps - 10 hold  Sitting Heel Slide with Towel - 1 x daily - 7 x weekly - 1 sets - 10 reps - 10 hold    GOALS:   Patient stated goal: Return to playing golf; Be able to walk and swim    [] Progressing: [] Met: [] Not Met: [] Adjusted    Therapist goals for Patient:   Short Term Goals: To be achieved in: 2 weeks  1. Independent in HEP and progression per patient tolerance, in order to prevent re-injury. [] Progressing: [] Met: [] Not Met: [] Adjusted   2. Patient will have a decrease in pain to facilitate improvement in movement, function, and ADLs as indicated by Functional Deficits. [] Progressing: [] Met: [] Not Met: [] Adjusted    Long Term Goals: To be achieved in: 16 weeks  1. Disability index score of 55 on FOTO to assist with reaching prior level of function. [] Progressing: [] Met: [] Not Met: [] Adjusted  2. Patient will demonstrate increased AROM to WNL to allow for proper joint functioning as indicated by patients Functional Deficits. [] Progressing: [] Met: [] Not Met: [] Adjusted  3. Patient will demonstrate an increase in Strength to good proximal hip strength and control  in LE to allow for proper functional mobility as indicated by patients Functional Deficits. [] Progressing: [] Met: [] Not Met: [] Adjusted  4. Patient will return to Independent functional activities without increased symptoms or restriction.    [] Progressing: [] Met: [] Not Met: [] Adjusted  5. Patient will reports returning to recreational activities with no restriction. [] Progressing: [] Met: [] Not Met: [] Adjusted       Electronically signed by:  Radha Rico, PT, DPT    Note: If patient does not return for scheduled/ recommended follow up visits, this note will serve as a discharge from care along with most recent update on progress.

## 2022-08-02 ENCOUNTER — HOSPITAL ENCOUNTER (OUTPATIENT)
Dept: PHYSICAL THERAPY | Age: 57
Setting detail: THERAPIES SERIES
Discharge: HOME | End: 2022-08-02
Payer: COMMERCIAL

## 2022-08-02 PROCEDURE — 97112 NEUROMUSCULAR REEDUCATION: CPT

## 2022-08-02 PROCEDURE — 97110 THERAPEUTIC EXERCISES: CPT

## 2022-08-02 PROCEDURE — 97016 VASOPNEUMATIC DEVICE THERAPY: CPT

## 2022-08-02 NOTE — FLOWSHEET NOTE
The [address] [place] [address],[address], [address], [address] [place] [address]  Phone: [phone]   Fax:     [phone]    Physical Therapy Daily Treatment Note  Date:  2022    Patient Name:  Eryn Huff    :  1965  MRN: [id number]  Restrictions/Precautions:    Medical/Treatment Diagnosis Information:  Diagnosis: S83.232D (ICD-10-CM) - Complex tear of medial meniscus, current injury, left knee, subsequent encounter  Treatment Diagnosis: M25.562 Left Knee Pain  Insurance/Certification information:  PT [facility] Information[facility]  Physician Information:   Ajay Ariza MD  Has the plan of care been signed (Y/N):        []  Yes  [x]  No     Date of Patient follow up with Physician:       Is this a Progress Report:     []  Yes  [x]  No        If Yes:  Date Range for reporting period:  Beginning 22  Ending 22    Progress report will be due (10 Rx or 30 days whichever is less): 3/83/09       Recertification will be due (POC Duration  / 90 days whichever is less): 22         Visit # Insurance Allowable Auth Required   2  8/2 [place] []  Yes [x]  No        Functional Scale: FOTO: 27    Date assessed:  22       Latex Allergy:  [x]NO      []YES  Preferred Language for Healthcare:   [x]English       []other:    Pain level:  1-4/10     SUBJECTIVE:   Patient states that she is doing pretty well.     OBJECTIVE:   Flexibility   L R Comment   Hamstring Moderate restriction     Gastroc      ITB      Quad              ROM   PROM AROM Overpressure Comment    L R L R L R    Flexion   80    Under 90 degrees   Extension   0                               Strength   *Not tested due to recent surgery L R Comment   Quad Good contraction present     Hamstring      Gastroc      Hip  flexion      Hip abd                      Special Test    *Not tested due to recent surgery Results/Comment   Meniscal Click    Crepitus    Flexion Test    Valgus Laxity    Varus Laxity    Lachmans    Drop Back    Homans negative           Girth L R   Mid Patella 45.5 cm 42.5 cm   Suprapatellar 46.5 cm 45.5 cm   5cm above 49 cm 48.5 cm   15cm above       Reflexes/Sensation: 7/29    [x]Dermatomes/Myotomes intact    []Reflexes equal and normal bilaterally   []Other:    Joint mobility: 7/29  *Not tested due to recent surgery    []Normal    []Hypo   []Hyper    Palpation: Denies TTP  7/29    Functional Mobility/Transfers: Requires use of bilateral crutches for ADLs secondary to recent surgical procedure; unable to participate in recreational activities   7/29    Posture: WNL  7/29    Bandages/Dressings/Incisions: Bandages removed; no signs or symptoms of infection  7/29    Gait: (include devices/WB status) NWB with use of bilateral crutches  7/29                       [x] Patient history, allergies, meds reviewed. Medical chart reviewed. See intake form. 7/29    Review Of Systems (ROS):  [x]Performed Review of systems (Integumentary, CardioPulmonary, Neurological) by intake and observation. Intake form has been scanned into medical record. Patient has been instructed to contact their primary care physician regarding ROS issues if not already being addressed at this time.   7/29    RESTRICTIONS/PRECAUTIONS: L Meniscal Root Repair 7/28/22    Exercises/Interventions:   Exercise/Equipment Resistance/Repetitions Other comments   Stretching     Hamstring 30 sec x 5    Towel Pull 30 sec x 5    Inclined Calf     Hip Flexion     ITB     Groin     Quad                    SLR     Supine 3 x 10 Start 8/2   Abduction 3 x 10 Start 8/2   Adduction     Prone     SLR+          Isometrics     Quad sets 10 sec x 10    Hip adduction sets 10 sec x 10    Patellar Glides     Medial     Superior     Inferior          ROM     Sheet Pulls 10 sec x 5    Hang Weights     Passive     Active     Weight Shift     Ankle Pumps 30x every hour                   CKC     Calf raises     Wall sits Step ups     1 leg stand     Squatting     CC TKE     Balance     bridges          PRE     Extension  RANGE:   Flexion  RANGE:        Quantum machines     Leg press      Leg extension     Leg curl          Manual interventions                     Therapeutic Exercise and NMR EXR  [x] (39023) Provided verbal/tactile cueing for activities related to strengthening, flexibility, endurance, ROM for improvements in LE, proximal hip, and core control with self care, mobility, lifting, ambulation.  [] (24653) Provided verbal/tactile cueing for activities related to improving balance, coordination, kinesthetic sense, posture, motor skill, proprioception  to assist with LE, proximal hip, and core control in self care, mobility, lifting, ambulation and eccentric single leg control.      NMR and Therapeutic Activities:    [] (89189 or 98312) Provided verbal/tactile cueing for activities related to improving balance, coordination, kinesthetic sense, posture, motor skill, proprioception and motor activation to allow for proper function of core, proximal hip and LE with self care and ADLs  [] (60273) Gait Re-education- Provided training and instruction to the patient for proper LE, core and proximal hip recruitment and positioning and eccentric body weight control with ambulation re-education including up and down stairs     Home Exercise Program:    [x] (94551) Reviewed/Progressed HEP activities related to strengthening, flexibility, endurance, ROM of core, proximal hip and LE for functional self-care, mobility, lifting and ambulation/stair navigation   [] (06464)Reviewed/Progressed HEP activities related to improving balance, coordination, kinesthetic sense, posture, motor skill, proprioception of core, proximal hip and LE for self care, mobility, lifting, and ambulation/stair navigation      Manual Treatments:  PROM / STM / Oscillations-Mobs:  G-I, II, III, IV (PA's, Inf., Post.)  [] (62513) Provided manual therapy to mobilize LE, proximal hip and/or LS spine soft tissue/joints for the purpose of modulating pain, promoting relaxation,  increasing ROM, reducing/eliminating soft tissue swelling/inflammation/restriction, improving soft tissue extensibility and allowing for proper ROM for normal function with self care, mobility, lifting and ambulation. Modalities:      Charges:  Timed Code Treatment Minutes: 30 + VASO   Total Treatment Minutes: 65  4:45-5:50       [] EVAL (LOW) 38212 (typically 20 minutes face-to-face)  [] EVAL (MOD) 33999 (typically 30 minutes face-to-face)  [] EVAL (HIGH) 05683 (typically 45 minutes face-to-face)  [] RE-EVAL   [x] HE(82181) x 1     [] IONTO  [x] NMR (87753) x 1     [x] VASO  8/2  [] Manual (13358) x      [] Other:  [] TA x      [] Mech Traction (73371)  [] ES(attended) (52008)      [] ES (un) (77747):     GOALS:   Patient stated goal: Return to playing golf; Be able to walk and swim    [] Progressing: [] Met: [] Not Met: [] Adjusted    Therapist goals for Patient:   Short Term Goals: To be achieved in: 2 weeks  1. Independent in HEP and progression per patient tolerance, in order to prevent re-injury. [] Progressing: [] Met: [] Not Met: [] Adjusted   2. Patient will have a decrease in pain to facilitate improvement in movement, function, and ADLs as indicated by Functional Deficits. [] Progressing: [] Met: [] Not Met: [] Adjusted    Long Term Goals: To be achieved in: 16 weeks  1. Disability index score of 55 on FOTO to assist with reaching prior level of function. [] Progressing: [] Met: [] Not Met: [] Adjusted  2. Patient will demonstrate increased AROM to WNL to allow for proper joint functioning as indicated by patients Functional Deficits. [] Progressing: [] Met: [] Not Met: [] Adjusted  3. Patient will demonstrate an increase in Strength to good proximal hip strength and control  in LE to allow for proper functional mobility as indicated by patients Functional Deficits.    [] Progressing: [] Met: [] Not Met: [] Adjusted  4. Patient will return to Independent functional activities without increased symptoms or restriction. [] Progressing: [] Met: [] Not Met: [] Adjusted  5. Patient will reports returning to recreational activities with no restriction. [] Progressing: [] Met: [] Not Met: [] Adjusted     Overall Progression Towards Functional goals/ Treatment Progress Update:  [] Patient is progressing as expected towards functional goals listed. [] Progression is slowed due to complexities/Impairments listed. [] Progression has been slowed due to co-morbidities. [x] Plan just implemented, too soon to assess goals progression <30days   [] Goals require adjustment due to lack of progress  [] Patient is not progressing as expected and requires additional follow up with physician  [] Other    Prognosis for POC: [x] Good [] Fair  [] Poor      Patient requires continued skilled intervention: [x] Yes  [] No    Treatment/Activity Tolerance:  [x] Patient able to complete treatment  [] Patient limited by fatigue  [] Patient limited by pain     [] Patient limited by other medical complications  [x] Other: 8/2 Good tolerance to program today. Good form present with all exercises. Able to flex knee to 80 degrees with minimal pain. Continue to progress per protocol. Patient Education:                  PLAN: See eval  [x] Continue per plan of care [] Alter current plan (see comments above)  [] Plan of care initiated [] Hold pending MD visit [] Discharge      Electronically signed by:  Kj Grider, PT, DPT    Note: If patient does not return for scheduled/ recommended follow up visits, this note will serve as a discharge from care along with most recent update on progress.

## 2022-08-04 ENCOUNTER — HOSPITAL ENCOUNTER (OUTPATIENT)
Dept: PHYSICAL THERAPY | Age: 57
Setting detail: THERAPIES SERIES
Discharge: HOME | End: 2022-08-04
Payer: COMMERCIAL

## 2022-08-04 PROCEDURE — 97016 VASOPNEUMATIC DEVICE THERAPY: CPT

## 2022-08-04 PROCEDURE — 97110 THERAPEUTIC EXERCISES: CPT

## 2022-08-04 PROCEDURE — 97112 NEUROMUSCULAR REEDUCATION: CPT

## 2022-08-04 NOTE — FLOWSHEET NOTE
[facility] 65, [facility] [address] [place] [address], [address] [place] [address]  Phone: [phone]   Fax:     [phone]    Physical Therapy Daily Treatment Note  Date:  2022    Patient Name:  Dylon Marcelino    :  1965  MRN: [id number]  Restrictions/Precautions:    Medical/Treatment Diagnosis Information:  Diagnosis: S83.232D (ICD-10-CM) - Complex tear of medial meniscus, current injury, left knee, subsequent encounter  Treatment Diagnosis: M25.562 Left Knee Pain  Insurance/Certification information:  PT Insurance Information: [facility]  Physician Information:   Antonio Das MD  Has the plan of care been signed (Y/N):        []  Yes  [x]  No     Date of Patient follow up with Physician:       Is this a Progress Report:     []  Yes  [x]  No        If Yes:  Date Range for reporting period:  Beginning 22  Ending 22    Progress report will be due (10 Rx or 30 days whichever is less):        Recertification will be due (POC Duration  / 90 days whichever is less): 22         Visit # Insurance Allowable Auth Required   3   MN []  Yes [x]  No        Functional Scale: FOTO: 27    Date assessed:  22       Latex Allergy:  [x]NO      []YES  Preferred Language for Healthcare:   [x]English       []other:    Pain level:  1-4/10     SUBJECTIVE:   Patient states that she is doing pretty well. She states that she was swollen after her last session.     OBJECTIVE:   Flexibility   L R Comment   Hamstring Moderate restriction     Gastroc      ITB      Quad              ROM   PROM AROM Overpressure Comment    L R L R L R    Flexion   80    Under 90 degrees   Extension   0                               Strength   *Not tested due to recent surgery L R Comment   Quad Good contraction present     Hamstring      Gastroc      Hip  flexion      Hip abd                      Special Test    *Not tested due to recent surgery Results/Comment   Meniscal Click    Crepitus    Flexion Test    Valgus Laxity    Varus Laxity    Lachmans    Drop Back    Homans negative           Girth L R   Mid Patella 45.5 cm 42.5 cm   Suprapatellar 46.5 cm 45.5 cm   5cm above 49 cm 48.5 cm   15cm above       Reflexes/Sensation: 7/29    [x]Dermatomes/Myotomes intact    []Reflexes equal and normal bilaterally   []Other:    Joint mobility: 7/29  *Not tested due to recent surgery    []Normal    []Hypo   []Hyper    Palpation: Denies TTP  7/29    Functional Mobility/Transfers: Requires use of bilateral crutches for ADLs secondary to recent surgical procedure; unable to participate in recreational activities   7/29    Posture: WNL  7/29    Bandages/Dressings/Incisions: Bandages removed; no signs or symptoms of infection  7/29    Gait: (include devices/WB status) NWB with use of bilateral crutches  7/29                       [x] Patient history, allergies, meds reviewed. Medical chart reviewed. See intake form. 7/29    Review Of Systems (ROS):  [x]Performed Review of systems (Integumentary, CardioPulmonary, Neurological) by intake and observation. Intake form has been scanned into medical record. Patient has been instructed to contact their primary care physician regarding ROS issues if not already being addressed at this time.   7/29    RESTRICTIONS/PRECAUTIONS: L Meniscal Root Repair 7/28/22    Exercises/Interventions:   Exercise/Equipment Resistance/Repetitions Other comments   Stretching     Hamstring 30 sec x 5    Towel Pull 30 sec x 5    Inclined Calf     Hip Flexion     ITB     Groin     Quad                    SLR     Supine 3 x 10 Start 8/2   Abduction 3 x 10 Start 8/2   Adduction     Prone 3 x 10 Start 8/4   SLR+          Isometrics     Quad sets 10 sec x 10    Hip adduction sets 10 sec x 10    Patellar Glides     Medial     Superior     Inferior          ROM     Sheet Pulls 10 sec x 5    Hang Weights     Passive     Active     Weight Shift     Ankle Pumps 30x every hour                   CKC     Calf raises 3 x 10; black band Start 8/4   Wall sits     Step ups     1 leg stand     Squatting     CC TKE     Balance     bridges          PRE     Extension  RANGE:   Flexion  RANGE:        Quantum machines     Leg press      Leg extension     Leg curl          Manual interventions                     Therapeutic Exercise and NMR EXR  [x] (33080) Provided verbal/tactile cueing for activities related to strengthening, flexibility, endurance, ROM for improvements in LE, proximal hip, and core control with self care, mobility, lifting, ambulation.  [] (84056) Provided verbal/tactile cueing for activities related to improving balance, coordination, kinesthetic sense, posture, motor skill, proprioception  to assist with LE, proximal hip, and core control in self care, mobility, lifting, ambulation and eccentric single leg control.      NMR and Therapeutic Activities:    [] (93403 or 03647) Provided verbal/tactile cueing for activities related to improving balance, coordination, kinesthetic sense, posture, motor skill, proprioception and motor activation to allow for proper function of core, proximal hip and LE with self care and ADLs  [] (89737) Gait Re-education- Provided training and instruction to the patient for proper LE, core and proximal hip recruitment and positioning and eccentric body weight control with ambulation re-education including up and down stairs     Home Exercise Program:    [x] (46495) Reviewed/Progressed HEP activities related to strengthening, flexibility, endurance, ROM of core, proximal hip and LE for functional self-care, mobility, lifting and ambulation/stair navigation   [] (73877)Reviewed/Progressed HEP activities related to improving balance, coordination, kinesthetic sense, posture, motor skill, proprioception of core, proximal hip and LE for self care, mobility, lifting, and ambulation/stair navigation      Manual Treatments:  PROM / STM / Oscillations-Mobs:  G-I, II, III, IV (PA's, Inf., Post.)  [] (68989) Provided manual therapy to mobilize LE, proximal hip and/or LS spine soft tissue/joints for the purpose of modulating pain, promoting relaxation,  increasing ROM, reducing/eliminating soft tissue swelling/inflammation/restriction, improving soft tissue extensibility and allowing for proper ROM for normal function with self care, mobility, lifting and ambulation. Modalities:      Charges:  Timed Code Treatment Minutes: 30 + VASO   Total Treatment Minutes: 22  1:69-6:56       [] EVAL (LOW) 51523 (typically 20 minutes face-to-face)  [] EVAL (MOD) 76388 (typically 30 minutes face-to-face)  [] EVAL (HIGH) 11467 (typically 45 minutes face-to-face)  [] RE-EVAL   [x] AY(87412) x 1     [] IONTO  [x] NMR (95881) x 1     [x] VASO  8/4  [] Manual (91480) x      [] Other:  [] TA x      [] Mech Traction (81122)  [] ES(attended) (67174)      [] ES (un) (22888):     GOALS:   Patient stated goal: Return to playing golf; Be able to walk and swim    [] Progressing: [] Met: [] Not Met: [] Adjusted    Therapist goals for Patient:   Short Term Goals: To be achieved in: 2 weeks  1. Independent in HEP and progression per patient tolerance, in order to prevent re-injury. [] Progressing: [] Met: [] Not Met: [] Adjusted   2. Patient will have a decrease in pain to facilitate improvement in movement, function, and ADLs as indicated by Functional Deficits. [] Progressing: [] Met: [] Not Met: [] Adjusted    Long Term Goals: To be achieved in: 16 weeks  1. Disability index score of 55 on FOTO to assist with reaching prior level of function. [] Progressing: [] Met: [] Not Met: [] Adjusted  2. Patient will demonstrate increased AROM to WNL to allow for proper joint functioning as indicated by patients Functional Deficits. [] Progressing: [] Met: [] Not Met: [] Adjusted  3.  Patient will demonstrate an increase in Strength to good proximal hip strength and control in LE to allow for proper functional mobility as indicated by patients Functional Deficits. [] Progressing: [] Met: [] Not Met: [] Adjusted  4. Patient will return to Independent functional activities without increased symptoms or restriction. [] Progressing: [] Met: [] Not Met: [] Adjusted  5. Patient will reports returning to recreational activities with no restriction. [] Progressing: [] Met: [] Not Met: [] Adjusted     Overall Progression Towards Functional goals/ Treatment Progress Update:  [] Patient is progressing as expected towards functional goals listed. [] Progression is slowed due to complexities/Impairments listed. [] Progression has been slowed due to co-morbidities. [x] Plan just implemented, too soon to assess goals progression <30days   [] Goals require adjustment due to lack of progress  [] Patient is not progressing as expected and requires additional follow up with physician  [] Other    Prognosis for POC: [x] Good [] Fair  [] Poor      Patient requires continued skilled intervention: [x] Yes  [] No    Treatment/Activity Tolerance:  [x] Patient able to complete treatment  [] Patient limited by fatigue  [] Patient limited by pain     [] Patient limited by other medical complications  [x] Other: 8/4 Good tolerance to program today. Good form present with all exercises. No ill side effects. Continue to progress per protocol. Patient Education:                  PLAN: See eval  [x] Continue per plan of care [] Alter current plan (see comments above)  [] Plan of care initiated [] Hold pending MD visit [] Discharge      Electronically signed by:  Kaelyn Chavez PT, DPT    Note: If patient does not return for scheduled/ recommended follow up visits, this note will serve as a discharge from care along with most recent update on progress.

## 2022-08-09 ENCOUNTER — HOSPITAL ENCOUNTER (OUTPATIENT)
Dept: PHYSICAL THERAPY | Age: 57
Setting detail: THERAPIES SERIES
Discharge: HOME | End: 2022-08-09
Payer: COMMERCIAL

## 2022-08-09 PROCEDURE — 97112 NEUROMUSCULAR REEDUCATION: CPT

## 2022-08-09 PROCEDURE — 97110 THERAPEUTIC EXERCISES: CPT

## 2022-08-09 NOTE — FLOWSHEET NOTE
The [address] [place] [address],[address], [address], [address] [place] [address]  Phone: [phone]   Fax:     [phone]    Physical Therapy Daily Treatment Note  Date:  2022    Patient Name:  Eileen Barth    :  1965  MRN: [id number]  Restrictions/Precautions:    Medical/Treatment Diagnosis Information:  Diagnosis: S83.232D (ICD-10-CM) - Complex tear of medial meniscus, current injury, left knee, subsequent encounter  Treatment Diagnosis: M25.562 Left Knee Pain  Insurance/Certification information:  PT Insurance Information: [facility]  Physician Information:   Evangelista Russell MD  Has the plan of care been signed (Y/N):        []  Yes  [x]  No     Date of Patient follow up with Physician:       Is this a Progress Report:     []  Yes  [x]  No        If Yes:  Date Range for reporting period:  Beginning 22  Ending 22    Progress report will be due (10 Rx or 30 days whichever is less):        Recertification will be due (POC Duration  / 90 days whichever is less): 22         Visit # Insurance Allowable Auth Required   3   MN []  Yes [x]  No        Functional Scale: FOTO: 27    Date assessed:  22       Latex Allergy:  [x]NO      []YES  Preferred Language for Healthcare:   [x]English       []other:    Pain level:  1-4/10     SUBJECTIVE:   Patient states that she is doing pretty well. She states that she was swollen after her last session.     OBJECTIVE:   Flexibility   L R Comment   Hamstring Moderate restriction     Gastroc      ITB      Quad              ROM   PROM AROM Overpressure Comment    L R L R L R    Flexion   86    Under 90 degrees   Extension   0                               Strength   *Not tested due to recent surgery L R Comment   Quad Good contraction present     Hamstring      Gastroc      Hip  flexion      Hip abd                      Special Test    *Not tested due to recent surgery Results/Comment   Meniscal Click    Crepitus    Flexion Test    Valgus Laxity    Varus Laxity    Lachmans    Drop Back    Homans negative           Girth L R   Mid Patella 45.5 cm 42.5 cm   Suprapatellar 46.5 cm 45.5 cm   5cm above 49 cm 48.5 cm   15cm above       Reflexes/Sensation: 7/29    [x]Dermatomes/Myotomes intact    []Reflexes equal and normal bilaterally   []Other:    Joint mobility: 7/29  *Not tested due to recent surgery    []Normal    []Hypo   []Hyper    Palpation: Denies TTP  7/29    Functional Mobility/Transfers: Requires use of bilateral crutches for ADLs secondary to recent surgical procedure; unable to participate in recreational activities   7/29    Posture: WNL  7/29    Bandages/Dressings/Incisions: Bandages removed; no signs or symptoms of infection  7/29    Gait: (include devices/WB status) NWB with use of bilateral crutches  7/29                       [x] Patient history, allergies, meds reviewed. Medical chart reviewed. See intake form. 7/29    Review Of Systems (ROS):  [x]Performed Review of systems (Integumentary, CardioPulmonary, Neurological) by intake and observation. Intake form has been scanned into medical record. Patient has been instructed to contact their primary care physician regarding ROS issues if not already being addressed at this time.   7/29    RESTRICTIONS/PRECAUTIONS: L Meniscal Root Repair 7/28/22    Exercises/Interventions:   Exercise/Equipment Resistance/Repetitions Other comments   Stretching     Hamstring 30 sec x 5    Towel Pull 30 sec x 5    Inclined Calf     Hip Flexion     ITB     Groin     Quad                    SLR     Supine 3 x 10 Start 8/2   Abduction 3 x 10 Start 8/2   Adduction     Prone 3 x 10 Start 8/4   SLR+          Isometrics     Quad sets 10 sec x 10    Hip adduction sets 10 sec x 10    Patellar Glides     Medial     Superior     Inferior          ROM     Sheet Pulls 10 sec x 5    Hang Weights     Passive     Active     Weight Shift     Ankle Pumps 30x every hour                   CKC     Calf raises 3 x 10; silver band ^8/9   Wall sits     Step ups     1 leg stand     Squatting     CC TKE     Balance     bridges          PRE     Extension  RANGE:   Flexion  RANGE:        Quantum machines     Leg press      Leg extension     Leg curl          Manual interventions                     Therapeutic Exercise and NMR EXR  [x] (56895) Provided verbal/tactile cueing for activities related to strengthening, flexibility, endurance, ROM for improvements in LE, proximal hip, and core control with self care, mobility, lifting, ambulation.  [] (93400) Provided verbal/tactile cueing for activities related to improving balance, coordination, kinesthetic sense, posture, motor skill, proprioception  to assist with LE, proximal hip, and core control in self care, mobility, lifting, ambulation and eccentric single leg control.      NMR and Therapeutic Activities:    [] (40413 or 83785) Provided verbal/tactile cueing for activities related to improving balance, coordination, kinesthetic sense, posture, motor skill, proprioception and motor activation to allow for proper function of core, proximal hip and LE with self care and ADLs  [] (57280) Gait Re-education- Provided training and instruction to the patient for proper LE, core and proximal hip recruitment and positioning and eccentric body weight control with ambulation re-education including up and down stairs     Home Exercise Program:    [x] (43575) Reviewed/Progressed HEP activities related to strengthening, flexibility, endurance, ROM of core, proximal hip and LE for functional self-care, mobility, lifting and ambulation/stair navigation   [] (91328)Reviewed/Progressed HEP activities related to improving balance, coordination, kinesthetic sense, posture, motor skill, proprioception of core, proximal hip and LE for self care, mobility, lifting, and ambulation/stair navigation      Manual Treatments:  PROM / STM / Oscillations-Mobs:  G-I, II, III, IV (PA's, Inf., Post.)  [] (48264) Provided manual therapy to mobilize LE, proximal hip and/or LS spine soft tissue/joints for the purpose of modulating pain, promoting relaxation,  increasing ROM, reducing/eliminating soft tissue swelling/inflammation/restriction, improving soft tissue extensibility and allowing for proper ROM for normal function with self care, mobility, lifting and ambulation. Modalities:      Charges:  Timed Code Treatment Minutes: 45   Total Treatment Minutes: 64  4:45-5:49       [] EVAL (LOW) 48316 (typically 20 minutes face-to-face)  [] EVAL (MOD) 03832 (typically 30 minutes face-to-face)  [] EVAL (HIGH) 62464 (typically 45 minutes face-to-face)  [] RE-EVAL   [x] FP(09009) x 2    [] IONTO  [x] NMR (57922) x 1     [] VASO  8/4  [] Manual (82035) x      [] Other:  [] TA x      [] Mech Traction (97316)  [] ES(attended) (56049)      [] ES (un) (37273):     GOALS:   Patient stated goal: Return to playing golf; Be able to walk and swim    [] Progressing: [] Met: [] Not Met: [] Adjusted    Therapist goals for Patient:   Short Term Goals: To be achieved in: 2 weeks  1. Independent in HEP and progression per patient tolerance, in order to prevent re-injury. [] Progressing: [] Met: [] Not Met: [] Adjusted   2. Patient will have a decrease in pain to facilitate improvement in movement, function, and ADLs as indicated by Functional Deficits. [] Progressing: [] Met: [] Not Met: [] Adjusted    Long Term Goals: To be achieved in: 16 weeks  1. Disability index score of 55 on FOTO to assist with reaching prior level of function. [] Progressing: [] Met: [] Not Met: [] Adjusted  2. Patient will demonstrate increased AROM to WNL to allow for proper joint functioning as indicated by patients Functional Deficits. [] Progressing: [] Met: [] Not Met: [] Adjusted  3.  Patient will demonstrate an increase in Strength to good proximal hip strength and control  in LE to allow for proper functional mobility as indicated by patients Functional Deficits. [] Progressing: [] Met: [] Not Met: [] Adjusted  4. Patient will return to Independent functional activities without increased symptoms or restriction. [] Progressing: [] Met: [] Not Met: [] Adjusted  5. Patient will reports returning to recreational activities with no restriction. [] Progressing: [] Met: [] Not Met: [] Adjusted     Overall Progression Towards Functional goals/ Treatment Progress Update:  [] Patient is progressing as expected towards functional goals listed. [] Progression is slowed due to complexities/Impairments listed. [] Progression has been slowed due to co-morbidities. [x] Plan just implemented, too soon to assess goals progression <30days   [] Goals require adjustment due to lack of progress  [] Patient is not progressing as expected and requires additional follow up with physician  [] Other    Prognosis for POC: [x] Good [] Fair  [] Poor      Patient requires continued skilled intervention: [x] Yes  [] No    Treatment/Activity Tolerance:  [x] Patient able to complete treatment  [] Patient limited by fatigue  [] Patient limited by pain     [] Patient limited by other medical complications  [x] Other: 8/9 Good tolerance to program today. Good form present with all exercises. No ill side effects. Continue to progress per protocol. Patient Education:                  PLAN: See eval  [x] Continue per plan of care [] Alter current plan (see comments above)  [] Plan of care initiated [] Hold pending MD visit [] Discharge      Electronically signed by:  Esther Pierce PT, DPT    Note: If patient does not return for scheduled/ recommended follow up visits, this note will serve as a discharge from care along with most recent update on progress.

## 2022-08-11 ENCOUNTER — HOSPITAL ENCOUNTER (OUTPATIENT)
Dept: PHYSICAL THERAPY | Age: 57
Setting detail: THERAPIES SERIES
End: 2022-08-11
Payer: COMMERCIAL

## 2022-08-12 ENCOUNTER — HOSPITAL ENCOUNTER (OUTPATIENT)
Dept: PHYSICAL THERAPY | Age: 57
Setting detail: THERAPIES SERIES
Discharge: HOME | End: 2022-08-12
Payer: COMMERCIAL

## 2022-08-12 PROCEDURE — 97110 THERAPEUTIC EXERCISES: CPT

## 2022-08-12 PROCEDURE — 97112 NEUROMUSCULAR REEDUCATION: CPT

## 2022-08-12 NOTE — FLOWSHEET NOTE
The [address] [place] [address],[address], [address], [address] [place] [address]  Phone: [phone]   Fax:     [phone]    Physical Therapy Daily Treatment Note  Date:  2022    Patient Name:  Praveen Lamas    :  1965  MRN: [id number]  Restrictions/Precautions:    Medical/Treatment Diagnosis Information:  Diagnosis: S83.232D (ICD-10-CM) - Complex tear of medial meniscus, current injury, left knee, subsequent encounter  Treatment Diagnosis: M25.562 Left Knee Pain  Insurance/Certification information:  PT Insurance Information: [facility] [facility]  Physician Information:   Jordan Kumar MD  Has the plan of care been signed (Y/N):        []  Yes  [x]  No     Date of Patient follow up with Physician:       Is this a Progress Report:     []  Yes  [x]  No        If Yes:  Date Range for reporting period:  Beginning 22  Ending 22    Progress report will be due (10 Rx or 30 days whichever is less): 3/34/35       Recertification will be due (POC Duration  / 90 days whichever is less): 22         Visit # Insurance Allowable Auth Required   5  [place] []  Yes [x]  No        Functional Scale: FOTO: 27    Date assessed:  22       Latex Allergy:  [x]NO      []YES  Preferred Language for Healthcare:   [x]English       []other:    Pain level:  1-4/10     SUBJECTIVE:   No complaints at this time. Doing well.     OBJECTIVE:   Flexibility   L R Comment   Hamstring Moderate restriction     Gastroc      ITB      Quad              ROM   PROM AROM Overpressure Comment    L R L R L R    Flexion   86    Under 90 degrees   Extension   0                               Strength   *Not tested due to recent surgery L R Comment   Quad Good contraction present     Hamstring      Gastroc      Hip  flexion      Hip abd                      Special Test    *Not tested due to recent surgery Results/Comment   Meniscal Click    Crepitus    Flexion Test    Valgus Laxity    Varus Laxity    Lachmans    Drop Back    Homans negative           Girth L R   Mid Patella 45.5 cm 42.5 cm   Suprapatellar 46.5 cm 45.5 cm   5cm above 49 cm 48.5 cm   15cm above       Reflexes/Sensation: 7/29    [x]Dermatomes/Myotomes intact    []Reflexes equal and normal bilaterally   []Other:    Joint mobility: 7/29  *Not tested due to recent surgery    []Normal    []Hypo   []Hyper    Palpation: Denies TTP  7/29    Functional Mobility/Transfers: Requires use of bilateral crutches for ADLs secondary to recent surgical procedure; unable to participate in recreational activities   7/29    Posture: WNL  7/29    Bandages/Dressings/Incisions: Bandages removed; no signs or symptoms of infection  7/29    Gait: (include devices/WB status) NWB with use of bilateral crutches  7/29                       [x] Patient history, allergies, meds reviewed. Medical chart reviewed. See intake form. 7/29    Review Of Systems (ROS):  [x]Performed Review of systems (Integumentary, CardioPulmonary, Neurological) by intake and observation. Intake form has been scanned into medical record. Patient has been instructed to contact their primary care physician regarding ROS issues if not already being addressed at this time.   7/29    RESTRICTIONS/PRECAUTIONS: L Meniscal Root Repair 7/28/22    Exercises/Interventions:   Exercise/Equipment Resistance/Repetitions Other comments   Stretching     Hamstring 30 sec x 5    Towel Pull 30 sec x 5    Inclined Calf     Hip Flexion     ITB     Groin     Quad                    SLR     Supine 3 x 10 Start 8/2   Abduction 3 x 10 Start 8/2   Adduction 3 x 10 Start 8/12   Prone 3 x 10 Start 8/4   SLR+          Isometrics     Quad sets 10 sec x 10    Hip adduction sets 10 sec x 10    Patellar Glides     Medial     Superior     Inferior          ROM     Sheet Pulls 10 sec x 5    Hang Weights     Passive     Active     Weight Shift     Ankle Pumps 30x every hour                   CKC Oscillations-Mobs:  G-I, II, III, IV (PA's, Inf., Post.)  [] (12116) Provided manual therapy to mobilize LE, proximal hip and/or LS spine soft tissue/joints for the purpose of modulating pain, promoting relaxation,  increasing ROM, reducing/eliminating soft tissue swelling/inflammation/restriction, improving soft tissue extensibility and allowing for proper ROM for normal function with self care, mobility, lifting and ambulation. Modalities:      Charges:  Timed Code Treatment Minutes: 45   Total Treatment Minutes: 64  4:45-5:49       [] EVAL (LOW) 35129 (typically 20 minutes face-to-face)  [] EVAL (MOD) 03820 (typically 30 minutes face-to-face)  [] EVAL (HIGH) 73444 (typically 45 minutes face-to-face)  [] RE-EVAL   [x] RU(30396) x 2    [] IONTO  [x] NMR (38324) x 1     [] VASO  8/4  [] Manual (56146) x      [] Other:  [] TA x      [] Mech Traction (60410)  [] ES(attended) (77462)      [] ES (un) (53620):     GOALS:   Patient stated goal: Return to playing golf; Be able to walk and swim    [] Progressing: [] Met: [] Not Met: [] Adjusted    Therapist goals for Patient:   Short Term Goals: To be achieved in: 2 weeks  1. Independent in HEP and progression per patient tolerance, in order to prevent re-injury. [] Progressing: [] Met: [] Not Met: [] Adjusted   2. Patient will have a decrease in pain to facilitate improvement in movement, function, and ADLs as indicated by Functional Deficits. [] Progressing: [] Met: [] Not Met: [] Adjusted    Long Term Goals: To be achieved in: 16 weeks  1. Disability index score of 55 on FOTO to assist with reaching prior level of function. [] Progressing: [] Met: [] Not Met: [] Adjusted  2. Patient will demonstrate increased AROM to WNL to allow for proper joint functioning as indicated by patients Functional Deficits. [] Progressing: [] Met: [] Not Met: [] Adjusted  3.  Patient will demonstrate an increase in Strength to good proximal hip strength and control  in LE to allow for proper functional mobility as indicated by patients Functional Deficits. [] Progressing: [] Met: [] Not Met: [] Adjusted  4. Patient will return to Independent functional activities without increased symptoms or restriction. [] Progressing: [] Met: [] Not Met: [] Adjusted  5. Patient will reports returning to recreational activities with no restriction. [] Progressing: [] Met: [] Not Met: [] Adjusted     Overall Progression Towards Functional goals/ Treatment Progress Update:  [] Patient is progressing as expected towards functional goals listed. [] Progression is slowed due to complexities/Impairments listed. [] Progression has been slowed due to co-morbidities. [x] Plan just implemented, too soon to assess goals progression <30days   [] Goals require adjustment due to lack of progress  [] Patient is not progressing as expected and requires additional follow up with physician  [] Other    Prognosis for POC: [x] Good [] Fair  [] Poor      Patient requires continued skilled intervention: [x] Yes  [] No    Treatment/Activity Tolerance:  [x] Patient able to complete treatment  [] Patient limited by fatigue  [] Patient limited by pain     [] Patient limited by other medical complications  [x] Other: 8/12 Good tolerance to program today. Good form present with all exercises. No ill side effects. Continue to progress per protocol. Patient Education:                  PLAN: See eval  [x] Continue per plan of care [] Alter current plan (see comments above)  [] Plan of care initiated [] Hold pending MD visit [] Discharge      Electronically signed by:  Alexandro Mckinney, PT, DPT    Note: If patient does not return for scheduled/ recommended follow up visits, this note will serve as a discharge from care along with most recent update on progress.

## 2022-08-16 ENCOUNTER — APPOINTMENT (OUTPATIENT)
Dept: PHYSICAL THERAPY | Age: 57
End: 2022-08-16
Payer: COMMERCIAL

## 2022-08-18 ENCOUNTER — HOSPITAL ENCOUNTER (OUTPATIENT)
Dept: PHYSICAL THERAPY | Age: 57
Setting detail: THERAPIES SERIES
Discharge: HOME | End: 2022-08-18
Payer: COMMERCIAL

## 2022-08-18 PROCEDURE — 97110 THERAPEUTIC EXERCISES: CPT

## 2022-08-18 PROCEDURE — 97112 NEUROMUSCULAR REEDUCATION: CPT

## 2022-08-18 NOTE — FLOWSHEET NOTE
The [address] [place] [address],[address], [address], [address] [place] [address]  Phone: [phone]   Fax:     [phone]    Physical Therapy Daily Treatment Note  Date:  2022    Patient Name:  Eileen Barth    :  1965  MRN: [id number]  Restrictions/Precautions:    Medical/Treatment Diagnosis Information:  Diagnosis: S83.232D (ICD-10-CM) - Complex tear of medial meniscus, current injury, left knee, subsequent encounter  Treatment Diagnosis: M25.562 Left Knee Pain  Insurance/Certification information:  PT Insurance Information: [facility] [facility]  Physician Information:   Evangelista Russell MD  Has the plan of care been signed (Y/N):        []  Yes  [x]  No     Date of Patient follow up with Physician:       Is this a Progress Report:     []  Yes  [x]  No        If Yes:  Date Range for reporting period:  Beginning 22  Ending 22    Progress report will be due (10 Rx or 30 days whichever is less): 3/01/03       Recertification will be due (POC Duration  / 90 days whichever is less): 22         Visit # Insurance Allowable Auth Required   6  [place] []  Yes [x]  No        Functional Scale: FOTO: 27    Date assessed:  22       Latex Allergy:  [x]NO      []YES  Preferred Language for Healthcare:   [x]English       []other:    Pain level:  0/10     SUBJECTIVE:   No complaints at this time. Doing well.     OBJECTIVE:   Flexibility   L R Comment   Hamstring Moderate restriction     Gastroc      ITB      Quad              ROM   PROM AROM Overpressure Comment    L R L R L R    Flexion   86    Under 90 degrees   Extension   0                               Strength   *Not tested due to recent surgery L R Comment   Quad Good contraction present     Hamstring      Gastroc      Hip  flexion      Hip abd                      Special Test    *Not tested due to recent surgery Results/Comment   Meniscal Click    Crepitus    Flexion Test Valgus Laxity    Varus Laxity    Lachmans    Drop Back    Homans negative           Girth L R   Mid Patella 45.5 cm 42.5 cm   Suprapatellar 46.5 cm 45.5 cm   5cm above 49 cm 48.5 cm   15cm above       Reflexes/Sensation: 7/29    [x]Dermatomes/Myotomes intact    []Reflexes equal and normal bilaterally   []Other:    Joint mobility: 7/29  *Not tested due to recent surgery    []Normal    []Hypo   []Hyper    Palpation: Denies TTP  7/29    Functional Mobility/Transfers: Requires use of bilateral crutches for ADLs secondary to recent surgical procedure; unable to participate in recreational activities   7/29    Posture: WNL  7/29    Bandages/Dressings/Incisions: Bandages removed; no signs or symptoms of infection  7/29    Gait: (include devices/WB status) NWB with use of bilateral crutches  7/29                       [x] Patient history, allergies, meds reviewed. Medical chart reviewed. See intake form. 7/29    Review Of Systems (ROS):  [x]Performed Review of systems (Integumentary, CardioPulmonary, Neurological) by intake and observation. Intake form has been scanned into medical record. Patient has been instructed to contact their primary care physician regarding ROS issues if not already being addressed at this time. 7/29    RESTRICTIONS/PRECAUTIONS: L Meniscal Root Repair 7/28/22    Exercises/Interventions:   Exercise/Equipment Resistance/Repetitions Other comments   Stretching     Hamstring 30 sec x 5    Towel Pull 30 sec x 5    Inclined Calf     Hip Flexion     ITB     Groin     Quad                    SLR     Supine 3 x 10; 1# ^8/18   Abduction 3 x 10; 1# ^8/18   Adduction 3 x 10 Start 8/12   Prone 3 x 10 Start 8/4   SLR+          Isometrics     Quad sets 10 min;  Biofeedback Start 8/18   Hip adduction sets 10 sec x 10    Patellar Glides     Medial     Superior     Inferior          ROM     Sheet Pulls 10 sec x 5    Hang Weights     Passive     Active     Weight Shift     Ankle Pumps 30x every hour CKC     Calf raises 3 x 10; silver band ^8/9   Wall sits     Step ups     1 leg stand     Squatting     CC TKE     Balance     bridges     clamshells 3 x 10 Start 8/12   PRE     Extension 3 x 10 RANGE:start 8/12   Flexion  RANGE:        Quantum machines     Leg press      Leg extension     Leg curl          Manual interventions                     Therapeutic Exercise and NMR EXR  [x] (05485) Provided verbal/tactile cueing for activities related to strengthening, flexibility, endurance, ROM for improvements in LE, proximal hip, and core control with self care, mobility, lifting, ambulation.  [] (30389) Provided verbal/tactile cueing for activities related to improving balance, coordination, kinesthetic sense, posture, motor skill, proprioception  to assist with LE, proximal hip, and core control in self care, mobility, lifting, ambulation and eccentric single leg control.      NMR and Therapeutic Activities:    [] (91849 or 48523) Provided verbal/tactile cueing for activities related to improving balance, coordination, kinesthetic sense, posture, motor skill, proprioception and motor activation to allow for proper function of core, proximal hip and LE with self care and ADLs  [] (77103) Gait Re-education- Provided training and instruction to the patient for proper LE, core and proximal hip recruitment and positioning and eccentric body weight control with ambulation re-education including up and down stairs     Home Exercise Program:    [x] (84011) Reviewed/Progressed HEP activities related to strengthening, flexibility, endurance, ROM of core, proximal hip and LE for functional self-care, mobility, lifting and ambulation/stair navigation   [] (58952)Reviewed/Progressed HEP activities related to improving balance, coordination, kinesthetic sense, posture, motor skill, proprioception of core, proximal hip and LE for self care, mobility, lifting, and ambulation/stair navigation      Manual Treatments:  PROM / STM / Oscillations-Mobs:  G-I, II, III, IV (PA's, Inf., Post.)  [] (67461) Provided manual therapy to mobilize LE, proximal hip and/or LS spine soft tissue/joints for the purpose of modulating pain, promoting relaxation,  increasing ROM, reducing/eliminating soft tissue swelling/inflammation/restriction, improving soft tissue extensibility and allowing for proper ROM for normal function with self care, mobility, lifting and ambulation. Modalities:      Charges:  Timed Code Treatment Minutes: 55   Total Treatment Minutes: 60  4:45-5:45       [] EVAL (LOW) 10522 (typically 20 minutes face-to-face)  [] EVAL (MOD) 12819 (typically 30 minutes face-to-face)  [] EVAL (HIGH) 62844 (typically 45 minutes face-to-face)  [] RE-EVAL   [x] VO(38074) x 2    [] IONTO  [x] NMR (83078) x 2     [] VASO  8/4  [] Manual (16398) x      [] Other:  [] TA x      [] Mech Traction (50809)  [] ES(attended) (39328)      [x] ES (un) (93850): 8/18/22 Biofeedback    GOALS:   Patient stated goal: Return to playing golf; Be able to walk and swim    [] Progressing: [] Met: [] Not Met: [] Adjusted    Therapist goals for Patient:   Short Term Goals: To be achieved in: 2 weeks  1. Independent in HEP and progression per patient tolerance, in order to prevent re-injury. [] Progressing: [] Met: [] Not Met: [] Adjusted   2. Patient will have a decrease in pain to facilitate improvement in movement, function, and ADLs as indicated by Functional Deficits. [] Progressing: [] Met: [] Not Met: [] Adjusted    Long Term Goals: To be achieved in: 16 weeks  1. Disability index score of 55 on FOTO to assist with reaching prior level of function. [] Progressing: [] Met: [] Not Met: [] Adjusted  2. Patient will demonstrate increased AROM to WNL to allow for proper joint functioning as indicated by patients Functional Deficits. [] Progressing: [] Met: [] Not Met: [] Adjusted  3.  Patient will demonstrate an increase in Strength to good proximal hip strength and control  in LE to allow for proper functional mobility as indicated by patients Functional Deficits. [] Progressing: [] Met: [] Not Met: [] Adjusted  4. Patient will return to Independent functional activities without increased symptoms or restriction. [] Progressing: [] Met: [] Not Met: [] Adjusted  5. Patient will reports returning to recreational activities with no restriction. [] Progressing: [] Met: [] Not Met: [] Adjusted     Overall Progression Towards Functional goals/ Treatment Progress Update:  [] Patient is progressing as expected towards functional goals listed. [] Progression is slowed due to complexities/Impairments listed. [] Progression has been slowed due to co-morbidities. [x] Plan just implemented, too soon to assess goals progression <30days   [] Goals require adjustment due to lack of progress  [] Patient is not progressing as expected and requires additional follow up with physician  [] Other    Prognosis for POC: [x] Good [] Fair  [] Poor      Patient requires continued skilled intervention: [x] Yes  [] No    Treatment/Activity Tolerance:  [x] Patient able to complete treatment  [] Patient limited by fatigue  [] Patient limited by pain     [] Patient limited by other medical complications  [x] Other: 8/18 Good tolerance to program today. Good form present with all exercises. No ill side effects. Challenged by biofeedback this session. Continue to progress per protocol. Patient Education:                  PLAN: See eval  [x] Continue per plan of care [] Alter current plan (see comments above)  [] Plan of care initiated [] Hold pending MD visit [] Discharge      Electronically signed by:  Faustino Mullins, PT, DPT    Note: If patient does not return for scheduled/ recommended follow up visits, this note will serve as a discharge from care along with most recent update on progress.

## 2022-08-19 ENCOUNTER — PATIENT MESSAGE (OUTPATIENT)
Dept: PRIMARY CARE CLINIC | Age: 57
End: 2022-08-19

## 2022-08-22 NOTE — TELEPHONE ENCOUNTER
From: Gabriela Sauer  To: Dr. Anali Lynne: 8/19/2022 10:33 AM EDT  Subject: Nausea and Dizziness    Good Morning,    Surgery went well. I'm on my last week of no weight bearing on the left leg. Can't wait to walk again. I have a question. I've always chalked up my car sickness and vertigo to seasonal changes in the weather, but wondered if a VNG test would clarify things. I'm frequently dizzy and nauseous. What do you think?

## 2022-08-23 ENCOUNTER — HOSPITAL ENCOUNTER (OUTPATIENT)
Dept: PHYSICAL THERAPY | Age: 57
Setting detail: THERAPIES SERIES
Discharge: HOME | End: 2022-08-23
Payer: COMMERCIAL

## 2022-08-23 PROCEDURE — 97110 THERAPEUTIC EXERCISES: CPT

## 2022-08-23 PROCEDURE — 97112 NEUROMUSCULAR REEDUCATION: CPT

## 2022-08-23 NOTE — FLOWSHEET NOTE
Formerly [facility], [address] [address] [place] [address], [address] [place] [address]  Phone: [phone]   Fax:     [phone]    Physical Therapy Daily Treatment Note  Date:  2022    Patient Name:  Tania Vivas    :  1965  MRN: [id number]  Restrictions/Precautions:    Medical/Treatment Diagnosis Information:  Diagnosis: S83.232D (ICD-10-CM) - Complex tear of medial meniscus, current injury, left knee, subsequent encounter  Treatment Diagnosis: M25.562 Left Knee Pain  Insurance/Certification information:  PT Insurance Information: [facility]  Physician Information:   Lyudmila Puente MD  Has the plan of care been signed (Y/N):        []  Yes  [x]  No     Date of Patient follow up with Physician:       Is this a Progress Report:     []  Yes  [x]  No        If Yes:  Date Range for reporting period:  Beginning 22  Ending 22    Progress report will be due (10 Rx or 30 days whichever is less):        Recertification will be due (POC Duration  / 90 days whichever is less): 22         Visit # Insurance Allowable Auth Required   7   [place] []  Yes [x]  No        Functional Scale: FOTO: 27    Date assessed:  22       Latex Allergy:  [x]NO      []YES  Preferred Language for Healthcare:   [x]English       []other:    Pain level:  0/10     SUBJECTIVE:       OBJECTIVE:   Flexibility   L R Comment   Hamstring Moderate restriction     Gastroc      ITB      Quad              ROM   PROM AROM Overpressure Comment    L R L R L R    Flexion   86    Under 90 degrees   Extension   0                               Strength   *Not tested due to recent surgery L R Comment   Quad Good contraction present     Hamstring      Gastroc      Hip  flexion      Hip abd                      Special Test    *Not tested due to recent surgery Results/Comment   Meniscal Click    Crepitus    Flexion Test    Valgus Laxity    Varus Laxity Lachmans    Drop Back    Homans negative           Girth L R   Mid Patella 45.5 cm 42.5 cm   Suprapatellar 46.5 cm 45.5 cm   5cm above 49 cm 48.5 cm   15cm above       Reflexes/Sensation: 7/29    [x]Dermatomes/Myotomes intact    []Reflexes equal and normal bilaterally   []Other:    Joint mobility: 7/29  *Not tested due to recent surgery    []Normal    []Hypo   []Hyper    Palpation: Denies TTP  7/29    Functional Mobility/Transfers: Requires use of bilateral crutches for ADLs secondary to recent surgical procedure; unable to participate in recreational activities   7/29    Posture: WNL  7/29    Bandages/Dressings/Incisions: Bandages removed; no signs or symptoms of infection  7/29    Gait: (include devices/WB status) NWB with use of bilateral crutches  7/29                       [x] Patient history, allergies, meds reviewed. Medical chart reviewed. See intake form. 7/29    Review Of Systems (ROS):  [x]Performed Review of systems (Integumentary, CardioPulmonary, Neurological) by intake and observation. Intake form has been scanned into medical record. Patient has been instructed to contact their primary care physician regarding ROS issues if not already being addressed at this time.   7/29    RESTRICTIONS/PRECAUTIONS: L Meniscal Root Repair 7/28/22    Exercises/Interventions:   Exercise/Equipment Resistance/Repetitions Other comments   Stretching     Hamstring 30 sec x 5    Towel Pull 30 sec x 5    Inclined Calf     Hip Flexion     ITB     Groin     Quad                    SLR     Supine 3 x 10; 2# ^8/23   Abduction 3 x 10; 2# ^8/23   Adduction 3 x 10 Start 8/12   Prone 3 x 10 Start 8/4   SLR+ 5 x 10 sec Start 8/23        Isometrics     Hip adduction sets 10 sec x 10    Patellar Glides     Medial     Superior     Inferior          ROM     Sheet Pulls 10 sec x 5    Hang Weights     Passive     Active     Weight Shift     Ankle Pumps 30x every hour                   CKC     Calf raises 3 x 10; silver band ^8/9   [place].)  [] (38508) Provided manual therapy to mobilize LE, proximal hip and/or LS spine soft tissue/joints for the purpose of modulating pain, promoting relaxation,  increasing ROM, reducing/eliminating soft tissue swelling/inflammation/restriction, improving soft tissue extensibility and allowing for proper ROM for normal function with self care, mobility, lifting and ambulation. Modalities:      Charges:  Timed Code Treatment Minutes: 40   Total Treatment Minutes: 45  4:45-5:30       [] EVAL (LOW) 51129 (typically 20 minutes face-to-face)  [] EVAL (MOD) 71067 (typically 30 minutes face-to-face)  [] EVAL (HIGH) 46939 (typically 45 minutes face-to-face)  [] RE-EVAL   [x] EX(04235) x 2    [] IONTO  [x] NMR (25377) x 1     [] VASO  8/4  [] Manual (55664) x      [] Other:  [] TA x      [] Mech Traction (73728)  [] ES(attended) (49722)      [x] ES (un) (31398): 8/18/22 Biofeedback    GOALS:   Patient stated goal: Return to playing golf; Be able to walk and swim    [] Progressing: [] Met: [] Not Met: [] Adjusted    Therapist goals for Patient:   Short Term Goals: To be achieved in: 2 weeks  1. Independent in HEP and progression per patient tolerance, in order to prevent re-injury. [] Progressing: [] Met: [] Not Met: [] Adjusted   2. Patient will have a decrease in pain to facilitate improvement in movement, function, and ADLs as indicated by Functional Deficits. [] Progressing: [] Met: [] Not Met: [] Adjusted    Long Term Goals: To be achieved in: 16 weeks  1. Disability index score of 55 on FOTO to assist with reaching prior level of function. [] Progressing: [] Met: [] Not Met: [] Adjusted  2. Patient will demonstrate increased AROM to WNL to allow for proper joint functioning as indicated by patients Functional Deficits. [] Progressing: [] Met: [] Not Met: [] Adjusted  3.  Patient will demonstrate an increase in Strength to good proximal hip strength and control  in LE to allow for proper functional mobility as indicated by patients Functional Deficits. [] Progressing: [] Met: [] Not Met: [] Adjusted  4. Patient will return to Independent functional activities without increased symptoms or restriction. [] Progressing: [] Met: [] Not Met: [] Adjusted  5. Patient will reports returning to recreational activities with no restriction. [] Progressing: [] Met: [] Not Met: [] Adjusted     Overall Progression Towards Functional goals/ Treatment Progress Update:  [] Patient is progressing as expected towards functional goals listed. [] Progression is slowed due to complexities/Impairments listed. [] Progression has been slowed due to co-morbidities. [x] Plan just implemented, too soon to assess goals progression <30days   [] Goals require adjustment due to lack of progress  [] Patient is not progressing as expected and requires additional follow up with physician  [] Other    Prognosis for POC: [x] Good [] Fair  [] Poor      Patient requires continued skilled intervention: [x] Yes  [] No    Treatment/Activity Tolerance:  [x] Patient able to complete treatment  [] Patient limited by fatigue  [] Patient limited by pain     [] Patient limited by other medical complications  [x] Other: 8/23 Good tolerance to program today. Good form present with all exercises. No ill side effects. Plan to add hamstring curls and WB next session. Continue to progress per protocol. Patient Education:                  PLAN: See eval  [x] Continue per plan of care [] Alter current plan (see comments above)  [] Plan of care initiated [] Hold pending MD visit [] Discharge      Electronically signed by:  Armando Levine PT, DPT    Note: If patient does not return for scheduled/ recommended follow up visits, this note will serve as a discharge from care along with most recent update on progress.

## 2022-08-25 ENCOUNTER — HOSPITAL ENCOUNTER (OUTPATIENT)
Dept: PHYSICAL THERAPY | Age: 57
Setting detail: THERAPIES SERIES
Discharge: HOME | End: 2022-08-25
Payer: COMMERCIAL

## 2022-08-25 PROCEDURE — 97112 NEUROMUSCULAR REEDUCATION: CPT

## 2022-08-25 PROCEDURE — 97110 THERAPEUTIC EXERCISES: CPT

## 2022-08-25 NOTE — PROGRESS NOTES
The [address] [place] [address],[address], [address], [address] [place] [address]  Phone: [phone]   Fax:     [phone]    Physical Therapy Daily Treatment Note  Date:  2022    Patient Name:  Elizabeth Olson    :  1965  MRN: [id number]  Restrictions/Precautions:    Medical/Treatment Diagnosis Information:  Diagnosis: S83.232D (ICD-10-CM) - Complex tear of medial meniscus, current injury, left knee, subsequent encounter  Treatment Diagnosis: M25.562 Left Knee Pain  Insurance/Certification information:  PT Insurance Information: [facility]  Physician Information:   Urbano Kelley MD  Has the plan of care been signed (Y/N):        []  Yes  [x]  No     Date of Patient follow up with Physician:       Is this a Progress Report:     []  Yes  [x]  No        If Yes:  Date Range for reporting period:  Beginning 22  Ending 22    Progress report will be due (10 Rx or 30 days whichever is less): 3/71/19       Recertification will be due (POC Duration  / 90 days whichever is less): 22         Visit # Insurance Allowable Auth Required   8   MN []  Yes [x]  No        Functional Scale: FOTO: 63    Date assessed:  22       Latex Allergy:  [x]NO      []YES  Preferred Language for Healthcare:   [x]English       []other:    Pain level:  0/10     SUBJECTIVE:   Patient states that she is doing well.      OBJECTIVE:   Flexibility   L R Comment   Hamstring Min restriction     Gastroc Min restriction     ITB      Quad              ROM   PROM AROM Overpressure Comment    L R L R L R    Flexion   125    Under 90 degrees   Extension   0                               Strength  L R Comment   Quad Good contraction present     Hamstring      Gastroc      Hip  flexion      Hip abd                      Special Test   Results/Comment   Meniscal Click    Crepitus    Flexion Test    Valgus Laxity    Varus Laxity    Lachmans    Drop Back machines     Leg press      Leg extension     Leg curl          Manual interventions                     Therapeutic Exercise and NMR EXR  [x] (89750) Provided verbal/tactile cueing for activities related to strengthening, flexibility, endurance, ROM for improvements in LE, proximal hip, and core control with self care, mobility, lifting, ambulation.  [] (14100) Provided verbal/tactile cueing for activities related to improving balance, coordination, kinesthetic sense, posture, motor skill, proprioception  to assist with LE, proximal hip, and core control in self care, mobility, lifting, ambulation and eccentric single leg control.      NMR and Therapeutic Activities:    [] (29727 or 59857) Provided verbal/tactile cueing for activities related to improving balance, coordination, kinesthetic sense, posture, motor skill, proprioception and motor activation to allow for proper function of core, proximal hip and LE with self care and ADLs  [] (14346) Gait Re-education- Provided training and instruction to the patient for proper LE, core and proximal hip recruitment and positioning and eccentric body weight control with ambulation re-education including up and down stairs     Home Exercise Program:    [x] (44364) Reviewed/Progressed HEP activities related to strengthening, flexibility, endurance, ROM of core, proximal hip and LE for functional self-care, mobility, lifting and ambulation/stair navigation   [] (85298)Reviewed/Progressed HEP activities related to improving balance, coordination, kinesthetic sense, posture, motor skill, proprioception of core, proximal hip and LE for self care, mobility, lifting, and ambulation/stair navigation      Manual Treatments:  PROM / STM / Oscillations-Mobs:  G-I, II, III, IV (PA's, Inf., Post.)  [] (46422) Provided manual therapy to mobilize LE, proximal hip and/or LS spine soft tissue/joints for the purpose of modulating pain, promoting relaxation,  increasing ROM, reducing/eliminating soft tissue swelling/inflammation/restriction, improving soft tissue extensibility and allowing for proper ROM for normal function with self care, mobility, lifting and ambulation. Modalities:      Charges:  Timed Code Treatment Minutes: 45   Total Treatment Minutes: 51  4:43-5:34       [] EVAL (LOW) 78236 (typically 20 minutes face-to-face)  [] EVAL (MOD) 16755 (typically 30 minutes face-to-face)  [] EVAL (HIGH) 60332 (typically 45 minutes face-to-face)  [] RE-EVAL   [x] MC(80216) x 2    [] IONTO  [x] NMR (82252) x 1     [] VASO  8/4  [] Manual (99942) x      [] Other:  [] TA x      [] Mech Traction (82746)  [] ES(attended) (14920)      [] ES (un) (78180): 8/18/22 Biofeedback    GOALS:   Patient stated goal: Return to playing golf; Be able to walk and swim    [] Progressing: [] Met: [] Not Met: [] Adjusted    Therapist goals for Patient:   Short Term Goals: To be achieved in: 2 weeks  1. Independent in HEP and progression per patient tolerance, in order to prevent re-injury. [] Progressing: [x] Met: [] Not Met: [] Adjusted   2. Patient will have a decrease in pain to facilitate improvement in movement, function, and ADLs as indicated by Functional Deficits. [] Progressing: [x] Met: [] Not Met: [] Adjusted    Long Term Goals: To be achieved in: 16 weeks  1. Disability index score of 55 on FOTO to assist with reaching prior level of function. [] Progressing: [x] Met: [] Not Met: [] Adjusted  2. Patient will demonstrate increased AROM to WNL to allow for proper joint functioning as indicated by patients Functional Deficits. [x] Progressing: [] Met: [] Not Met: [] Adjusted  3. Patient will demonstrate an increase in Strength to good proximal hip strength and control  in LE to allow for proper functional mobility as indicated by patients Functional Deficits. [x] Progressing: [] Met: [] Not Met: [] Adjusted  4.  Patient will return to Independent functional activities without increased symptoms or restriction. [x] Progressing: [] Met: [] Not Met: [] Adjusted  5. Patient will reports returning to recreational activities with no restriction. [x] Progressing: [] Met: [] Not Met: [] Adjusted     Overall Progression Towards Functional goals/ Treatment Progress Update:  [x] Patient is progressing as expected towards functional goals listed. [] Progression is slowed due to complexities/Impairments listed. [] Progression has been slowed due to co-morbidities. [] Plan just implemented, too soon to assess goals progression <30days   [] Goals require adjustment due to lack of progress  [] Patient is not progressing as expected and requires additional follow up with physician  [] Other    Prognosis for POC: [x] Good [] Fair  [] Poor      Patient requires continued skilled intervention: [x] Yes  [] No    Treatment/Activity Tolerance:  [x] Patient able to complete treatment  [] Patient limited by fatigue  [] Patient limited by pain     [] Patient limited by other medical complications  [x] Other: 8/25 Patient tolerated treatment well this session. Good tolerance to addition of weightbearing this session. No reports of pain. Continue to progress as tolerated. Patient Education:                  PLAN: See eval  [x] Continue per plan of care [] Alter current plan (see comments above)  [] Plan of care initiated [] Hold pending MD visit [] Discharge      Electronically signed by:  Zuri Sanon PT, DPT    Note: If patient does not return for scheduled/ recommended follow up visits, this note will serve as a discharge from care along with most recent update on progress.

## 2022-08-30 ENCOUNTER — HOSPITAL ENCOUNTER (OUTPATIENT)
Dept: PHYSICAL THERAPY | Age: 57
Setting detail: THERAPIES SERIES
Discharge: HOME | End: 2022-08-30
Payer: COMMERCIAL

## 2022-08-30 PROCEDURE — 97110 THERAPEUTIC EXERCISES: CPT

## 2022-08-30 PROCEDURE — 97112 NEUROMUSCULAR REEDUCATION: CPT

## 2022-08-30 NOTE — FLOWSHEET NOTE
The [address] [place] [address],[address], [address], [address] [place] [address]  Phone: [phone]   Fax:     [phone]    Physical Therapy Daily Treatment Note  Date:  2022    Patient Name:  Bianca Bee    :  1965  MRN: [id number]  Restrictions/Precautions:    Medical/Treatment Diagnosis Information:  Diagnosis: S83.232D (ICD-10-CM) - Complex tear of medial meniscus, current injury, left knee, subsequent encounter  Treatment Diagnosis: M25.562 Left Knee Pain  Insurance/Certification information:  PT Insurance Information: [facility]  Physician Information:   Martin Gonzalez MD  Has the plan of care been signed (Y/N):        []  Yes  [x]  No     Date of Patient follow up with Physician:       Is this a Progress Report:     []  Yes  [x]  No        If Yes:  Date Range for reporting period:  Beginning 22  Ending 22    Progress report will be due (10 Rx or 30 days whichever is less): 4/42/15       Recertification will be due (POC Duration  / 90 days whichever is less): 22         Visit # Insurance Allowable Auth Required   9   MN []  Yes [x]  No        Functional Scale: FOTO: 63    Date assessed:  22       Latex Allergy:  [x]NO      []YES  Preferred Language for Healthcare:   [x]English       []other:    Pain level:  0/10     SUBJECTIVE:   Patient states that she is sore but otherwise doing well.     OBJECTIVE:   Flexibility   L R Comment   Hamstring Min restriction     Gastroc Min restriction     ITB      Quad              ROM   PROM AROM Overpressure Comment    L R L R L R    Flexion   125    Under 90 degrees   Extension   0                               Strength  L R Comment   Quad Good contraction present     Hamstring      Gastroc      Hip  flexion      Hip abd                      Special Test   Results/Comment   Meniscal Click    Crepitus    Flexion Test    Valgus Laxity    Varus Laxity Lachmans    Drop Back    Homans negative         Reflexes/Sensation: 8/25    [x]Dermatomes/Myotomes intact    []Reflexes equal and normal bilaterally   []Other:    Joint mobility: 8/25  *Not tested due to recent surgery    []Normal    []Hypo   []Hyper    Palpation: Denies TTP  8/25    Functional Mobility/Transfers: Requires use of bilateral crutches for ADLs secondary to recent surgical procedure; unable to participate in recreational activities   8/25    Posture: WNL  8/25    Bandages/Dressings/Incisions: Incisions healing well  8/25    Gait: (include devices/WB status) 50% WB with use of bilateral crutches  8/25                       [x] Patient history, allergies, meds reviewed. Medical chart reviewed. See intake form. 7/29    Review Of Systems (ROS):  [x]Performed Review of systems (Integumentary, CardioPulmonary, Neurological) by intake and observation. Intake form has been scanned into medical record. Patient has been instructed to contact their primary care physician regarding ROS issues if not already being addressed at this time.   7/29    RESTRICTIONS/PRECAUTIONS: L Meniscal Root Repair 7/28/22    Exercises/Interventions:   Exercise/Equipment Resistance/Repetitions Other comments   Stretching     Hamstring 30 sec x 5    Towel Pull 30 sec x 5    Inclined Calf     Hip Flexion     ITB     Groin     Quad                    SLR     Supine 3 x 10; 2# ^8/23   Abduction 3 x 10; 2# ^8/23   Adduction 3 x 10 Start 8/12   Prone 3 x 10 Start 8/4   SLR+ 5 x 10 sec Start 8/23        Isometrics     Hip adduction sets 10 sec x 10    Patellar Glides     Medial     Superior     Inferior          ROM     Sheet Pulls 10 sec x 5    Hang Weights     Passive     Active     Weight Shift     Ankle Pumps 30x every hour                   CKC     Calf raises 3 x 10; silver band ^8/9   Wall sits     Step ups     1 leg stand     Squatting     CC TKE 3 x 10; 4 plates ^3/64   Balance board 30 sec x 3 each Start 8/25   bridges 3 x 10 Start 8/30   clamshells 3 x 10 Start 8/12   PRE     Extension 3 x 10 RANGE:start 8/12   Flexion Prone; 3 x 10 RANGE: start 8/25        Quantum machines     Leg press  3 x 10; 85# Start 8/30   Leg extension     Leg curl          Manual interventions                     Therapeutic Exercise and NMR EXR  [x] (75711) Provided verbal/tactile cueing for activities related to strengthening, flexibility, endurance, ROM for improvements in LE, proximal hip, and core control with self care, mobility, lifting, ambulation.  [] (91833) Provided verbal/tactile cueing for activities related to improving balance, coordination, kinesthetic sense, posture, motor skill, proprioception  to assist with LE, proximal hip, and core control in self care, mobility, lifting, ambulation and eccentric single leg control.      NMR and Therapeutic Activities:    [] (46028 or 99697) Provided verbal/tactile cueing for activities related to improving balance, coordination, kinesthetic sense, posture, motor skill, proprioception and motor activation to allow for proper function of core, proximal hip and LE with self care and ADLs  [] (89288) Gait Re-education- Provided training and instruction to the patient for proper LE, core and proximal hip recruitment and positioning and eccentric body weight control with ambulation re-education including up and down stairs     Home Exercise Program:    [x] (89967) Reviewed/Progressed HEP activities related to strengthening, flexibility, endurance, ROM of core, proximal hip and LE for functional self-care, mobility, lifting and ambulation/stair navigation   [] (63333)Reviewed/Progressed HEP activities related to improving balance, coordination, kinesthetic sense, posture, motor skill, proprioception of core, proximal hip and LE for self care, mobility, lifting, and ambulation/stair navigation      Manual Treatments:  PROM / STM / Oscillations-Mobs:  G-I, II, III, IV (PA's, Inf., Post.)  [] (18259) Provided manual therapy to mobilize LE, proximal hip and/or LS spine soft tissue/joints for the purpose of modulating pain, promoting relaxation,  increasing ROM, reducing/eliminating soft tissue swelling/inflammation/restriction, improving soft tissue extensibility and allowing for proper ROM for normal function with self care, mobility, lifting and ambulation. Modalities:      Charges:  Timed Code Treatment Minutes: 30   Total Treatment Minutes: 84  4:43-5:34       [] EVAL (LOW) 61706 (typically 20 minutes face-to-face)  [] EVAL (MOD) 84496 (typically 30 minutes face-to-face)  [] EVAL (HIGH) 90263 (typically 45 minutes face-to-face)  [] RE-EVAL   [x] JV(51235) x 1    [] IONTO  [x] NMR (38345) x 1     [] VASO  8/4  [] Manual (32291) x      [] Other:  [] TA x      [] Mech Traction (17617)  [] ES(attended) (21116)      [] ES (un) (09974): 8/18/22 Biofeedback    GOALS:   Patient stated goal: Return to playing golf; Be able to walk and swim    [] Progressing: [] Met: [] Not Met: [] Adjusted    Therapist goals for Patient:   Short Term Goals: To be achieved in: 2 weeks  1. Independent in HEP and progression per patient tolerance, in order to prevent re-injury. [] Progressing: [x] Met: [] Not Met: [] Adjusted   2. Patient will have a decrease in pain to facilitate improvement in movement, function, and ADLs as indicated by Functional Deficits. [] Progressing: [x] Met: [] Not Met: [] Adjusted    Long Term Goals: To be achieved in: 16 weeks  1. Disability index score of 55 on FOTO to assist with reaching prior level of function. [] Progressing: [x] Met: [] Not Met: [] Adjusted  2. Patient will demonstrate increased AROM to WNL to allow for proper joint functioning as indicated by patients Functional Deficits. [x] Progressing: [] Met: [] Not Met: [] Adjusted  3.  Patient will demonstrate an increase in Strength to good proximal hip strength and control  in LE to allow for proper functional mobility as indicated by patients Functional Deficits. [x] Progressing: [] Met: [] Not Met: [] Adjusted  4. Patient will return to Independent functional activities without increased symptoms or restriction. [x] Progressing: [] Met: [] Not Met: [] Adjusted  5. Patient will reports returning to recreational activities with no restriction. [x] Progressing: [] Met: [] Not Met: [] Adjusted     Overall Progression Towards Functional goals/ Treatment Progress Update:  [x] Patient is progressing as expected towards functional goals listed. [] Progression is slowed due to complexities/Impairments listed. [] Progression has been slowed due to co-morbidities. [] Plan just implemented, too soon to assess goals progression <30days   [] Goals require adjustment due to lack of progress  [] Patient is not progressing as expected and requires additional follow up with physician  [] Other    Prognosis for POC: [x] Good [] Fair  [] Poor      Patient requires continued skilled intervention: [x] Yes  [] No    Treatment/Activity Tolerance:  [x] Patient able to complete treatment  [] Patient limited by fatigue  [] Patient limited by pain     [] Patient limited by other medical complications  [x] Other: 8/30 Patient tolerated treatment well this session. Good tolerance to program. No reports of pain. Progress Wb ing next session. Continue to progress as tolerated. Patient Education:                  PLAN: See eval  [x] Continue per plan of care [] Alter current plan (see comments above)  [] Plan of care initiated [] Hold pending MD visit [] Discharge      Electronically signed by:  Shaw Singh PT, DPT    Note: If patient does not return for scheduled/ recommended follow up visits, this note will serve as a discharge from care along with most recent update on progress.

## 2022-09-01 ENCOUNTER — HOSPITAL ENCOUNTER (OUTPATIENT)
Dept: PHYSICAL THERAPY | Age: 57
Setting detail: THERAPIES SERIES
Discharge: HOME | End: 2022-09-01
Payer: COMMERCIAL

## 2022-09-01 PROCEDURE — 97530 THERAPEUTIC ACTIVITIES: CPT

## 2022-09-01 PROCEDURE — 97110 THERAPEUTIC EXERCISES: CPT

## 2022-09-01 PROCEDURE — 97112 NEUROMUSCULAR REEDUCATION: CPT

## 2022-09-01 NOTE — FLOWSHEET NOTE
The [address] [place] [address],[address], [address], [address] [place] [address]  Phone: [phone]   Fax:     [phone]    Physical Therapy Daily Treatment Note  Date:  2022    Patient Name:  Padmaja Mullins    :  1965  MRN: [id number]  Restrictions/Precautions:    Medical/Treatment Diagnosis Information:  Diagnosis: S83.232D (ICD-10-CM) - Complex tear of medial meniscus, current injury, left knee, subsequent encounter  Treatment Diagnosis: M25.562 Left Knee Pain  Insurance/Certification information:  PT Insurance Information: [facility]  Physician Information:   Darnell Randall MD  Has the plan of care been signed (Y/N):        []  Yes  [x]  No     Date of Patient follow up with Physician:       Is this a Progress Report:     []  Yes  [x]  No        If Yes:  Date Range for reporting period:  Beginning 22  Ending 22    Progress report will be due (10 Rx or 30 days whichever is less):        Recertification will be due (POC Duration  / 90 days whichever is less): 22         Visit # Insurance Allowable Auth Required   10   MN []  Yes [x]  No        Functional Scale: FOTO: 61    Date assessed:  22       Latex Allergy:  [x]NO      []YES  Preferred Language for Healthcare:   [x]English       []other:    Pain level:  0/10     SUBJECTIVE:   Patient states that she is doing well. Still sore on the inside of her knee but no worse.     OBJECTIVE:   Flexibility   L R Comment   Hamstring Min restriction     Gastroc Min restriction     ITB      Quad              ROM   PROM AROM Overpressure Comment    L R L R L R    Flexion   125    Under 90 degrees   Extension   0                               Strength  L R Comment   Quad Good contraction present     Hamstring      Gastroc      Hip  flexion      Hip abd                      Special Test   Results/Comment   Meniscal Click    Crepitus    Flexion Test    Valgus Laxity    Varus Laxity    Lachmans    Drop Back    Homans negative         Reflexes/Sensation: 8/25    [x]Dermatomes/Myotomes intact    []Reflexes equal and normal bilaterally   []Other:    Joint mobility: 8/25  *Not tested due to recent surgery    []Normal    []Hypo   []Hyper    Palpation: Denies TTP  8/25    Functional Mobility/Transfers: Requires use of bilateral crutches for ADLs secondary to recent surgical procedure; unable to participate in recreational activities   8/25    Posture: WNL  8/25    Bandages/Dressings/Incisions: Incisions healing well  8/25    Gait: (include devices/WB status) 50% WB with use of bilateral crutches  8/25                       [x] Patient history, allergies, meds reviewed. Medical chart reviewed. See intake form. 7/29    Review Of Systems (ROS):  [x]Performed Review of systems (Integumentary, CardioPulmonary, Neurological) by intake and observation. Intake form has been scanned into medical record. Patient has been instructed to contact their primary care physician regarding ROS issues if not already being addressed at this time.   7/29    RESTRICTIONS/PRECAUTIONS: L Meniscal Root Repair 7/28/22    Exercises/Interventions:   Exercise/Equipment Resistance/Repetitions Other comments   Stretching     Hamstring 30 sec x 5    Towel Pull 30 sec x 5    Inclined Calf     Hip Flexion     ITB     Groin     Quad                    SLR     Supine 3 x 10; 2# ^8/23   Abduction 3 x 10; 2# ^8/23   Adduction 3 x 10 Start 8/12   Prone 3 x 10 Start 8/4   SLR+ 5 x 10 sec Start 8/23        Isometrics     Hip adduction sets 10 sec x 10    Patellar Glides     Medial     Superior     Inferior          ROM     Sheet Pulls 10 sec x 5    Hang Weights     Passive     Active     Weight Shift     Ankle Pumps 30x every hour                   CKC     Calf raises 3 x 10; silver band ^8/9   Wall sits     Step ups     1 leg stand     Squatting     CC TKE 3 x 10; 4 plates ^0/48   Balance board 30 sec x 3 each Start 8/25   bridges 3 x 10 Start 8/30   clamshells 3 x 10 Start 8/12   PRE     Extension 3 x 10 RANGE:start 8/12   Flexion Prone; 3 x 10 RANGE: start 8/25        Quantum machines     Leg press  3 x 10; 85# Start 8/30   Leg extension 3 x 10 10# SL Start 9/1   Leg curl          Manual interventions                     Therapeutic Exercise and NMR EXR  [x] (13142) Provided verbal/tactile cueing for activities related to strengthening, flexibility, endurance, ROM for improvements in LE, proximal hip, and core control with self care, mobility, lifting, ambulation.  [] (50956) Provided verbal/tactile cueing for activities related to improving balance, coordination, kinesthetic sense, posture, motor skill, proprioception  to assist with LE, proximal hip, and core control in self care, mobility, lifting, ambulation and eccentric single leg control.      NMR and Therapeutic Activities:    [] (27447 or 58767) Provided verbal/tactile cueing for activities related to improving balance, coordination, kinesthetic sense, posture, motor skill, proprioception and motor activation to allow for proper function of core, proximal hip and LE with self care and ADLs  [] (66657) Gait Re-education- Provided training and instruction to the patient for proper LE, core and proximal hip recruitment and positioning and eccentric body weight control with ambulation re-education including up and down stairs     Home Exercise Program:    [x] (53943) Reviewed/Progressed HEP activities related to strengthening, flexibility, endurance, ROM of core, proximal hip and LE for functional self-care, mobility, lifting and ambulation/stair navigation   [] (77585)Reviewed/Progressed HEP activities related to improving balance, coordination, kinesthetic sense, posture, motor skill, proprioception of core, proximal hip and LE for self care, mobility, lifting, and ambulation/stair navigation      Manual Treatments:  PROM / STM / Oscillations-Mobs:  G-I, II, III, IV (PA's, Inf., Post.)  [] (31932) Provided manual therapy to mobilize LE, proximal hip and/or LS spine soft tissue/joints for the purpose of modulating pain, promoting relaxation,  increasing ROM, reducing/eliminating soft tissue swelling/inflammation/restriction, improving soft tissue extensibility and allowing for proper ROM for normal function with self care, mobility, lifting and ambulation. Modalities:      Charges:  Timed Code Treatment Minutes: 45   Total Treatment Minutes: 54  4:38-5:32       [] EVAL (LOW) 28968 (typically 20 minutes face-to-face)  [] EVAL (MOD) 31929 (typically 30 minutes face-to-face)  [] EVAL (HIGH) 41777 (typically 45 minutes face-to-face)  [] RE-EVAL   [x] OK(08328) x 1    [] IONTO  [x] NMR (65789) x 1     [] VASO  8/4  [] Manual (66413) x      [] Other:  [x] TA x 1      [] Mech Traction (26119)  [] ES(attended) (63601)      [] ES (un) (05050): 8/18/22 Biofeedback    GOALS:   Patient stated goal: Return to playing golf; Be able to walk and swim    [] Progressing: [] Met: [] Not Met: [] Adjusted    Therapist goals for Patient:   Short Term Goals: To be achieved in: 2 weeks  1. Independent in HEP and progression per patient tolerance, in order to prevent re-injury. [] Progressing: [x] Met: [] Not Met: [] Adjusted   2. Patient will have a decrease in pain to facilitate improvement in movement, function, and ADLs as indicated by Functional Deficits. [] Progressing: [x] Met: [] Not Met: [] Adjusted    Long Term Goals: To be achieved in: 16 weeks  1. Disability index score of 55 on FOTO to assist with reaching prior level of function. [] Progressing: [x] Met: [] Not Met: [] Adjusted  2. Patient will demonstrate increased AROM to WNL to allow for proper joint functioning as indicated by patients Functional Deficits. [x] Progressing: [] Met: [] Not Met: [] Adjusted  3.  Patient will demonstrate an increase in Strength to good proximal hip strength and control  in LE to allow for proper functional mobility as indicated by patients Functional Deficits. [x] Progressing: [] Met: [] Not Met: [] Adjusted  4. Patient will return to Independent functional activities without increased symptoms or restriction. [x] Progressing: [] Met: [] Not Met: [] Adjusted  5. Patient will reports returning to recreational activities with no restriction. [x] Progressing: [] Met: [] Not Met: [] Adjusted     Overall Progression Towards Functional goals/ Treatment Progress Update:  [x] Patient is progressing as expected towards functional goals listed. [] Progression is slowed due to complexities/Impairments listed. [] Progression has been slowed due to co-morbidities. [] Plan just implemented, too soon to assess goals progression <30days   [] Goals require adjustment due to lack of progress  [] Patient is not progressing as expected and requires additional follow up with physician  [] Other    Prognosis for POC: [x] Good [] Fair  [] Poor      Patient requires continued skilled intervention: [x] Yes  [] No    Treatment/Activity Tolerance:  [x] Patient able to complete treatment  [] Patient limited by fatigue  [] Patient limited by pain     [] Patient limited by other medical complications  [x] Other: 9/1 Patient tolerated treatment well this session. Good tolerance to program. No reports of pain. Good gait mechanics present with use of unilateral crutch. Continue to progress as tolerated. Patient Education:                  PLAN: See eval  [x] Continue per plan of care [] Alter current plan (see comments above)  [] Plan of care initiated [] Hold pending MD visit [] Discharge      Electronically signed by:  Esther Pierce PT, DPT    Note: If patient does not return for scheduled/ recommended follow up visits, this note will serve as a discharge from care along with most recent update on progress.

## 2022-09-07 ENCOUNTER — HOSPITAL ENCOUNTER (OUTPATIENT)
Dept: PHYSICAL THERAPY | Age: 57
Setting detail: THERAPIES SERIES
Discharge: HOME | End: 2022-09-07
Payer: COMMERCIAL

## 2022-09-07 PROCEDURE — 97110 THERAPEUTIC EXERCISES: CPT

## 2022-09-07 PROCEDURE — 97112 NEUROMUSCULAR REEDUCATION: CPT

## 2022-09-07 PROCEDURE — 97530 THERAPEUTIC ACTIVITIES: CPT

## 2022-09-07 NOTE — FLOWSHEET NOTE
Test    Valgus Laxity    Varus Laxity    Lachmans    Drop Back    Homans negative         Reflexes/Sensation: 8/25    [x]Dermatomes/Myotomes intact    []Reflexes equal and normal bilaterally   []Other:    Joint mobility: 8/25  *Not tested due to recent surgery    []Normal    []Hypo   []Hyper    Palpation: Denies TTP  8/25    Functional Mobility/Transfers: Requires use of bilateral crutches for ADLs secondary to recent surgical procedure; unable to participate in recreational activities   8/25    Posture: WNL  8/25    Bandages/Dressings/Incisions: Incisions healing well  8/25    Gait: (include devices/WB status) 50% WB with use of bilateral crutches  8/25                       [x] Patient history, allergies, meds reviewed. Medical chart reviewed. See intake form. 7/29    Review Of Systems (ROS):  [x]Performed Review of systems (Integumentary, CardioPulmonary, Neurological) by intake and observation. Intake form has been scanned into medical record. Patient has been instructed to contact their primary care physician regarding ROS issues if not already being addressed at this time.   7/29    RESTRICTIONS/PRECAUTIONS: L Meniscal Root Repair 7/28/22    Exercises/Interventions:   Exercise/Equipment Resistance/Repetitions Other comments   Stretching     Hamstring 30 sec x 5    Towel Pull 30 sec x 5    Inclined Calf     Hip Flexion     ITB     Groin     Quad                    SLR     Supine 3 x 10; 5# ^9/7   Abduction 3 x 10; 5# ^9/7   Adduction 3 x 10 Start 8/12   Prone 3 x 10 Start 8/4        Isometrics     Hip adduction sets 10 sec x 10    Patellar Glides     Medial     Superior     Inferior          ROM     Sheet Pulls 10 sec x 5    Hang Weights     Passive     Active     Weight Shift     Ankle Pumps 30x every hour                   CKC     Wall sits     Step ups     1 leg stand with ball toss 3 x 10; 2# Start 9/7   Squatting 1 x 10 on TRX Start 9/7   CC TKE 3 x 10; 5 plates ^8/9   Balance board 30 sec x 3 each Start 8/25   bridges 3 x 10 Start 8/30   clamshells 3 x 10 Start 8/12   PRE     Extension 3 x 10 RANGE:start 8/12   Flexion Prone; 3 x 10 RANGE: start 8/25        Quantum machines     Leg press  3 x 10; 85# Start 8/30   Leg extension 3 x 10 15# SL ^9/7   Leg curl 3 x 10; 20# Start 9/7        Manual interventions                     Therapeutic Exercise and NMR EXR  [x] (50274) Provided verbal/tactile cueing for activities related to strengthening, flexibility, endurance, ROM for improvements in LE, proximal hip, and core control with self care, mobility, lifting, ambulation.  [] (90673) Provided verbal/tactile cueing for activities related to improving balance, coordination, kinesthetic sense, posture, motor skill, proprioception  to assist with LE, proximal hip, and core control in self care, mobility, lifting, ambulation and eccentric single leg control.      NMR and Therapeutic Activities:    [] (92494 or 73911) Provided verbal/tactile cueing for activities related to improving balance, coordination, kinesthetic sense, posture, motor skill, proprioception and motor activation to allow for proper function of core, proximal hip and LE with self care and ADLs  [] (39309) Gait Re-education- Provided training and instruction to the patient for proper LE, core and proximal hip recruitment and positioning and eccentric body weight control with ambulation re-education including up and down stairs     Home Exercise Program:    [x] (07935) Reviewed/Progressed HEP activities related to strengthening, flexibility, endurance, ROM of core, proximal hip and LE for functional self-care, mobility, lifting and ambulation/stair navigation   [] (54741)Reviewed/Progressed HEP activities related to improving balance, coordination, kinesthetic sense, posture, motor skill, proprioception of core, proximal hip and LE for self care, mobility, lifting, and ambulation/stair navigation      Manual Treatments:  PROM / STM / Oscillations-Mobs:  LORNA, II, III, IV (Aury, Inf., Post.)  [] (18213) Provided manual therapy to mobilize LE, proximal hip and/or LS spine soft tissue/joints for the purpose of modulating pain, promoting relaxation,  increasing ROM, reducing/eliminating soft tissue swelling/inflammation/restriction, improving soft tissue extensibility and allowing for proper ROM for normal function with self care, mobility, lifting and ambulation. Modalities:      Charges:  Timed Code Treatment Minutes: 45   Total Treatment Minutes: 55  4:45-5:40       [] EVAL (LOW) 12567 (typically 20 minutes face-to-face)  [] EVAL (MOD) 18639 (typically 30 minutes face-to-face)  [] EVAL (HIGH) 10596 (typically 45 minutes face-to-face)  [] RE-EVAL   [x] YD(89048) x 1    [] IONTO  [x] NMR (55434) x 1     [] VASO  8/4  [] Manual (81626) x      [] Other:  [x] TA x 1      [] Mech Traction (54537)  [] ES(attended) (39129)      [] ES (un) (16907): 8/18/22 Biofeedback    GOALS:   Patient stated goal: Return to playing golf; Be able to walk and swim    [] Progressing: [] Met: [] Not Met: [] Adjusted    Therapist goals for Patient:   Short Term Goals: To be achieved in: 2 weeks  1. Independent in HEP and progression per patient tolerance, in order to prevent re-injury. [] Progressing: [x] Met: [] Not Met: [] Adjusted   2. Patient will have a decrease in pain to facilitate improvement in movement, function, and ADLs as indicated by Functional Deficits. [] Progressing: [x] Met: [] Not Met: [] Adjusted    Long Term Goals: To be achieved in: 16 weeks  1. Disability index score of 55 on FOTO to assist with reaching prior level of function. [] Progressing: [x] Met: [] Not Met: [] Adjusted  2. Patient will demonstrate increased AROM to WNL to allow for proper joint functioning as indicated by patients Functional Deficits. [x] Progressing: [] Met: [] Not Met: [] Adjusted  3.  Patient will demonstrate an increase in Strength to good proximal hip strength and control  in LE to allow for proper functional mobility as indicated by patients Functional Deficits. [x] Progressing: [] Met: [] Not Met: [] Adjusted  4. Patient will return to Independent functional activities without increased symptoms or restriction. [x] Progressing: [] Met: [] Not Met: [] Adjusted  5. Patient will reports returning to recreational activities with no restriction. [x] Progressing: [] Met: [] Not Met: [] Adjusted     Overall Progression Towards Functional goals/ Treatment Progress Update:  [x] Patient is progressing as expected towards functional goals listed. [] Progression is slowed due to complexities/Impairments listed. [] Progression has been slowed due to co-morbidities. [] Plan just implemented, too soon to assess goals progression <30days   [] Goals require adjustment due to lack of progress  [] Patient is not progressing as expected and requires additional follow up with physician  [] Other    Prognosis for POC: [x] Good [] Fair  [] Poor      Patient requires continued skilled intervention: [x] Yes  [] No    Treatment/Activity Tolerance:  [x] Patient able to complete treatment  [] Patient limited by fatigue  [] Patient limited by pain     [] Patient limited by other medical complications  [x] Other: 9/7 Patient tolerated treatment well this session. Good tolerance to program. No reports of pain. Good gait mechanics present without use of bilateral crutches. Continue to progress as tolerated. Patient Education:                  PLAN: See eval  [x] Continue per plan of care [] Alter current plan (see comments above)  [] Plan of care initiated [] Hold pending MD visit [] Discharge      Electronically signed by:  Randa Bowden, PT, DPT    Note: If patient does not return for scheduled/ recommended follow up visits, this note will serve as a discharge from care along with most recent update on progress.

## 2022-09-13 ENCOUNTER — HOSPITAL ENCOUNTER (OUTPATIENT)
Dept: PHYSICAL THERAPY | Age: 57
Setting detail: THERAPIES SERIES
Discharge: HOME | End: 2022-09-13
Payer: COMMERCIAL

## 2022-09-13 PROCEDURE — 97110 THERAPEUTIC EXERCISES: CPT

## 2022-09-13 PROCEDURE — 97112 NEUROMUSCULAR REEDUCATION: CPT

## 2022-09-13 PROCEDURE — 97530 THERAPEUTIC ACTIVITIES: CPT

## 2022-09-13 NOTE — FLOWSHEET NOTE
The [address] [place] [address],[address], [address], [address] [place] [address]  Phone: [phone]   Fax:     [phone]    Physical Therapy Daily Treatment Note  Date:  2022    Patient Name:  Say Vega    :  1965  MRN: [id number]  Restrictions/Precautions:    Medical/Treatment Diagnosis Information:  Diagnosis: S83.232D (ICD-10-CM) - Complex tear of medial meniscus, current injury, left knee, subsequent encounter  Treatment Diagnosis: M25.562 Left Knee Pain  Insurance/Certification information:  PT Insurance Information: [employer] Wells  Physician Information:   Dwayne Villagran MD  Has the plan of care been signed (Y/N):        []  Yes  [x]  No     Date of Patient follow up with Physician:       Is this a Progress Report:     []  Yes  [x]  No        If Yes:  Date Range for reporting period:  Beginning 22  Ending 22    Progress report will be due (10 Rx or 30 days whichever is less):        Recertification will be due (POC Duration  / 90 days whichever is less): 22         Visit # Insurance Allowable Auth Required   12   [place] []  Yes [x]  No        Functional Scale: FOTO: 63    Date assessed:  22       Latex Allergy:  [x]NO      []YES  Preferred Language for Healthcare:   [x]English       []other:    Pain level:  0/10     SUBJECTIVE:   Patient states that she is doing well. She states that she is having no pain.     OBJECTIVE:   Flexibility   L R Comment   Hamstring Min restriction     Gastroc Min restriction     ITB      Quad              ROM   PROM AROM Overpressure Comment    L R L R L R    Flexion   125    Under 90 degrees   Extension   0                               Strength  L R Comment   Quad Good contraction present     Hamstring      Gastroc      Hip  flexion      Hip abd                      Special Test   Results/Comment   Meniscal Click    Crepitus    Flexion Test    Valgus Laxity    Varus Laxity    Lachmans    Drop Back    Homans negative         Reflexes/Sensation: 8/25    [x]Dermatomes/Myotomes intact    []Reflexes equal and normal bilaterally   []Other:    Joint mobility: 8/25  *Not tested due to recent surgery    []Normal    []Hypo   []Hyper    Palpation: Denies TTP  8/25    Functional Mobility/Transfers: Requires use of bilateral crutches for ADLs secondary to recent surgical procedure; unable to participate in recreational activities   8/25    Posture: WNL  8/25    Bandages/Dressings/Incisions: Incisions healing well  8/25    Gait: (include devices/WB status) 50% WB with use of bilateral crutches  8/25                       [x] Patient history, allergies, meds reviewed. Medical chart reviewed. See intake form. 7/29    Review Of Systems (ROS):  [x]Performed Review of systems (Integumentary, CardioPulmonary, Neurological) by intake and observation. Intake form has been scanned into medical record. Patient has been instructed to contact their primary care physician regarding ROS issues if not already being addressed at this time.   7/29    RESTRICTIONS/PRECAUTIONS: L Meniscal Root Repair 7/28/22    Exercises/Interventions:   Exercise/Equipment Resistance/Repetitions Other comments   Stretching     Hamstring 30 sec x 5    Towel Pull 30 sec x 5    Inclined Calf 30 sec x 5 Start 9/13   Hip Flexion     ITB     Groin     Quad                    SLR     SLR+ 3 x 30 sec ^9/13        Isometrics     Hip adduction sets 10 sec x 10    Patellar Glides     Medial     Superior     Inferior          ROM     Sheet Pulls 10 sec x 5    Hang Weights     Passive     Active     Weight Shift     Ankle Pumps 30x every hour                   CKC     Wall sits     Step ups     1 leg stand with ball toss 3 x 10; 2# Start 9/7   Squatting 1 x 10 on TRX Start 9/7   CC TKE 3 x 10; 5 plates ^7/6   Balance board 30 sec x 3 each Start 8/25   bridges 3 x 10 Start 8/30   clamshells 3 x 10 Start 8/12   PRE Extension 3 x 10 RANGE:start 8/12   Flexion Prone; 3 x 10 RANGE: start 8/25        Quantum machines     Leg press  3 x 10; 85# Start 8/30   Leg extension 3 x 10 20# SL ^9/13   Leg curl 3 x 10; 30# ^9/13        Manual interventions                     Therapeutic Exercise and NMR EXR  [x] (30367) Provided verbal/tactile cueing for activities related to strengthening, flexibility, endurance, ROM for improvements in LE, proximal hip, and core control with self care, mobility, lifting, ambulation.  [] (11856) Provided verbal/tactile cueing for activities related to improving balance, coordination, kinesthetic sense, posture, motor skill, proprioception  to assist with LE, proximal hip, and core control in self care, mobility, lifting, ambulation and eccentric single leg control.      NMR and Therapeutic Activities:    [] (17227 or 31224) Provided verbal/tactile cueing for activities related to improving balance, coordination, kinesthetic sense, posture, motor skill, proprioception and motor activation to allow for proper function of core, proximal hip and LE with self care and ADLs  [] (26267) Gait Re-education- Provided training and instruction to the patient for proper LE, core and proximal hip recruitment and positioning and eccentric body weight control with ambulation re-education including up and down stairs     Home Exercise Program:    [x] (46501) Reviewed/Progressed HEP activities related to strengthening, flexibility, endurance, ROM of core, proximal hip and LE for functional self-care, mobility, lifting and ambulation/stair navigation   [] (77009)Reviewed/Progressed HEP activities related to improving balance, coordination, kinesthetic sense, posture, motor skill, proprioception of core, proximal hip and LE for self care, mobility, lifting, and ambulation/stair navigation      Manual Treatments:  PROM / STM / Oscillations-Mobs:  G-I, II, III, IV (PA's, Inf., Post.)  [] (44600) Provided manual therapy to mobilize LE, proximal hip and/or LS spine soft tissue/joints for the purpose of modulating pain, promoting relaxation,  increasing ROM, reducing/eliminating soft tissue swelling/inflammation/restriction, improving soft tissue extensibility and allowing for proper ROM for normal function with self care, mobility, lifting and ambulation. Modalities:      Charges:  Timed Code Treatment Minutes: 40   Total Treatment Minutes: 45  4:00-4:45       [] EVAL (LOW) 53730 (typically 20 minutes face-to-face)  [] EVAL (MOD) 91141 (typically 30 minutes face-to-face)  [] EVAL (HIGH) 07645 (typically 45 minutes face-to-face)  [] RE-EVAL   [x] ZK(98059) x 1    [] IONTO  [x] NMR (51984) x 1     [] VASO  8/4  [] Manual (65666) x      [] Other:  [x] TA x 1      [] Mech Traction (34181)  [] ES(attended) (56603)      [] ES (un) (41427): 8/18/22 Biofeedback    GOALS:   Patient stated goal: Return to playing golf; Be able to walk and swim    [] Progressing: [] Met: [] Not Met: [] Adjusted    Therapist goals for Patient:   Short Term Goals: To be achieved in: 2 weeks  1. Independent in HEP and progression per patient tolerance, in order to prevent re-injury. [] Progressing: [x] Met: [] Not Met: [] Adjusted   2. Patient will have a decrease in pain to facilitate improvement in movement, function, and ADLs as indicated by Functional Deficits. [] Progressing: [x] Met: [] Not Met: [] Adjusted    Long Term Goals: To be achieved in: 16 weeks  1. Disability index score of 55 on FOTO to assist with reaching prior level of function. [] Progressing: [x] Met: [] Not Met: [] Adjusted  2. Patient will demonstrate increased AROM to WNL to allow for proper joint functioning as indicated by patients Functional Deficits. [x] Progressing: [] Met: [] Not Met: [] Adjusted  3.  Patient will demonstrate an increase in Strength to good proximal hip strength and control  in LE to allow for proper functional mobility as indicated by patients Functional Deficits. [x] Progressing: [] Met: [] Not Met: [] Adjusted  4. Patient will return to Independent functional activities without increased symptoms or restriction. [x] Progressing: [] Met: [] Not Met: [] Adjusted  5. Patient will reports returning to recreational activities with no restriction. [x] Progressing: [] Met: [] Not Met: [] Adjusted     Overall Progression Towards Functional goals/ Treatment Progress Update:  [x] Patient is progressing as expected towards functional goals listed. [] Progression is slowed due to complexities/Impairments listed. [] Progression has been slowed due to co-morbidities. [] Plan just implemented, too soon to assess goals progression <30days   [] Goals require adjustment due to lack of progress  [] Patient is not progressing as expected and requires additional follow up with physician  [] Other    Prognosis for POC: [x] Good [] Fair  [] Poor      Patient requires continued skilled intervention: [x] Yes  [] No    Treatment/Activity Tolerance:  [x] Patient able to complete treatment  [] Patient limited by fatigue  [] Patient limited by pain     [] Patient limited by other medical complications  [x] Other: 9/13 Patient tolerated treatment well this session. Good tolerance to program. No reports of pain. Progressing very well. Continue to progress as tolerated. Follow-up after MD appointment. Patient Education:                  PLAN: See eval  [x] Continue per plan of care [] Alter current plan (see comments above)  [] Plan of care initiated [] Hold pending MD visit [] Discharge      Electronically signed by:  Odette Mcnair, PT, DPT    Note: If patient does not return for scheduled/ recommended follow up visits, this note will serve as a discharge from care along with most recent update on progress.

## 2022-09-14 ENCOUNTER — OFFICE VISIT (OUTPATIENT)
Dept: ORTHOPEDIC SURGERY | Age: 57
End: 2022-09-14

## 2022-09-14 VITALS — HEIGHT: 66 IN | WEIGHT: 190 LBS | BODY MASS INDEX: 30.53 KG/M2

## 2022-09-14 DIAGNOSIS — Z98.890 S/P LEFT KNEE ARTHROSCOPY: Primary | ICD-10-CM

## 2022-09-14 PROCEDURE — 99024 POSTOP FOLLOW-UP VISIT: CPT | Performed by: ORTHOPAEDIC SURGERY

## 2022-09-14 NOTE — PROGRESS NOTES
Chief Complaint  Follow-up (Left knee. S/p 1.5 months medial meniscus repair )      History of Present Illness:  Pepper Miller is a pleasant 62 y.o. female follow-up regarding her left knee. She is 6-week status post left knee arthroscopy with major synovectomy, chondroplasty medial femoral condyle medial meniscus root repair using accessory incision. She is doing very well. She is progressing through therapy as dictated by her protocol. Stopped using crutches a week ago and plans on discontinuing her brace in 1 week. Medical History:  Patient's medications, allergies, past medical, surgical, social and family histories were reviewed and updated as appropriate. Pain Assessment  Location of Pain: Knee  Location Modifiers: Left  Severity of Pain: 0  Quality of Pain:  (n/a)  Duration of Pain:  (n/a)  Frequency of Pain:  (n/a)  Aggravating Factors:  (no aggravating factors)  Limiting Behavior: Yes  Relieving Factors: Rest  Result of Injury: No  Work-Related Injury: No  Are there other pain locations you wish to document?: No  ROS: Review of systems reviewed from Patient History Form completed today and available in the patient's chart under the Media tab. Pertinent items are noted in HPI  Review of systems reviewed from Patient History Form completed today and available in the patient's chart under the Media tab. Vital Signs:  Ht 5' 6\" (1.676 m)   Wt 190 lb (86.2 kg)   LMP  (LMP Unknown)   BMI 30.67 kg/m²       Left knee examination:    Gait: No use of assistive devices. No antalgic gait. Alignment: normal alignment. Inspection/skin: Skin is intact without erythema or ecchymosis. No gross deformity. Healed incision    Palpation: mild crepitus. no joint line tenderness present. Range of Motion: There is full range of motion. Strength: Quadriceps weakness    Effusion: No effusion or swelling present. Ligamentous stability: No cruciate or collateral ligament instability. Neurologic and vascular: Skin is warm and well-perfused. Sensation is intact to light-touch. Righy knee examination:    Gait: No use of assistive devices. No antalgic gait. Alignment: normal alignment. Inspection/skin: Skin is intact without erythema or ecchymosis. No gross deformity. Palpation: mild crepitus. no joint line tenderness present. Range of Motion: There is full range of motion. Strength: Normal quadriceps development. Effusion: No effusion or swelling present. Ligamentous stability: No cruciate or collateral ligament instability. Neurologic and vascular: Skin is warm and well-perfused. Sensation is intact to light-touch. Special tests: Negative Francisco sign. Radiology:       Pertinent imaging was interpreted and reviewed with the patient today, images only - no report available. No new imaging was obtained during today's visit. Assessment :   6-week status post left knee arthroscopy with major synovectomy, chondroplasty medial femoral condyle medial meniscus root repair using accessory incision. Impression:  Encounter Diagnosis   Name Primary? S/P left knee arthroscopy Yes       Office Procedures:  No orders of the defined types were placed in this encounter. Plan: Pertinent imaging was reviewed. The etiology, natural history, and treatment options for the disorder were discussed. The roles of activity medication, antiinflammatories, injections, bracing, physical therapy, and surgical interventions were all described to the patient and questions were answered. Maribel Xiao is doing very good for 6 weeks out of her root repair. I would like her to continue working on her strengthening with physical therapy and following the protocol. Patient understands would like to proceed with this    Tiburcio Filter is in agreement with this plan.  All questions were answered to patient's satisfaction and was encouraged to call with any further questions. Abdiel Tomlinson, [address]  9/14/2022    During this exam, I, Abdiel Tomlinson PA-C, functioned as a scribe for Dr. Casi Humphreys. The history taking and physical examination were performed by Dr. Casi Humphreys. All counseling during the appointment was performed between the patient and Dr. Casi Humphreys. 9/14/2022 3:54 PM    This dictation was performed with a verbal recognition program (DRAGON) and it was checked for errors. It is possible that there are still dictated errors within this office note. If so, please bring any areas to my attention for an addendum. All efforts were made to ensure that this office note is accurate. I attest that I met personally with the patient, performed the described exam, reviewed the radiographic studies and medical records associated with this patient and supervised the services that are described above.      Papito Acevedo MD

## 2022-09-15 ENCOUNTER — APPOINTMENT (OUTPATIENT)
Dept: PHYSICAL THERAPY | Age: 57
End: 2022-09-15
Payer: COMMERCIAL

## 2022-09-20 ENCOUNTER — APPOINTMENT (OUTPATIENT)
Dept: PHYSICAL THERAPY | Age: 57
End: 2022-09-20
Payer: COMMERCIAL

## 2022-09-22 ENCOUNTER — APPOINTMENT (OUTPATIENT)
Dept: PHYSICAL THERAPY | Age: 57
End: 2022-09-22
Payer: COMMERCIAL

## 2022-09-23 DIAGNOSIS — G25.81 RESTLESS LEG: ICD-10-CM

## 2022-09-26 ENCOUNTER — PATIENT MESSAGE (OUTPATIENT)
Dept: PRIMARY CARE CLINIC | Age: 57
End: 2022-09-26

## 2022-09-26 ENCOUNTER — TELEPHONE (OUTPATIENT)
Dept: PRIMARY CARE CLINIC | Age: 57
End: 2022-09-26

## 2022-09-26 NOTE — TELEPHONE ENCOUNTER
From: Tania Vivas  To: Dr. Lauren Walters: 9/26/2022 10:53 AM EDT  Subject: Rash on face    Good morning,    I have a rash on my forehead that's been there for about 3 weeks. It appears to be spreading to my eye lids now. I've had other rashes on my face that took a long time to clear up with antibiotics and cream.  Can you call in a referral for Dr. Sonia Mejía so I can get this checked? I haven't become a hypochondriac, I'm just falling apart. Sadness.     Thanks, [employer]

## 2022-09-26 NOTE — TELEPHONE ENCOUNTER
----- Message from Ankita Gaytan sent at 9/26/2022 10:43 AM EDT -----  Subject: Referral Request    Reason for referral request? Pt needs referral for dermatologist. Pt would   like to schedule with Bianca Clemente at [facility]. That is the closest   location for patient. Provider patient wants to be referred to(if known):     Provider Phone Number(if known):     Additional Information for Provider?   ---------------------------------------------------------------------------  --------------  [address] [facility]    [phone]; OK to leave message on voicemail  ---------------------------------------------------------------------------  --------------

## 2022-10-10 PROBLEM — D22.9 MULTIPLE BENIGN MELANOCYTIC NEVI: Status: RESOLVED | Noted: 2019-02-13 | Resolved: 2022-10-10

## 2022-10-10 NOTE — PROGRESS NOTES
10/11/2022     Ashley Veloz (:  1965) is a 62 y.o. female, here for evaluation of the following medical concerns:    HPI  Rash: Brooks Quiñonez presents today for evaluation of a rash on her forehead, extending to her upper nose and nasolabial folds. She had a similar rash about 2 years ago. She saw a dermatologist and was diagnosed with perioral facial dermatitis. She was treated with a topical cream and antibiotics and the rash resolved in about 30 days. She has had no fevers, chills, nausea, vomiting or diarrhea. Obesity: Brooks Quiñonez also presents today, because she is interested in assistance with weight loss. She has been attempting calorie restricted diet with minimal success. She has not been keeping tight track of her calories. She has not been exercising regularly. Restless leg syndrome: Has been following with Dr. Zenaida Villa for treatment resistant restless leg syndrome. She is currently on Sinemet and pramipexole. She continues to complain of very aggravating nocturnal cyst symptoms. She had no side effects of her medicines. Review of Systems   Constitutional:  Negative for activity change, appetite change, fatigue and unexpected weight change. Eyes:  Negative for visual disturbance. Respiratory:  Negative for chest tightness and shortness of breath. Gastrointestinal:  Negative for abdominal distention, abdominal pain, diarrhea and nausea. Endocrine: Negative for polydipsia, polyphagia and polyuria. Genitourinary:  Negative for decreased urine volume, dysuria and frequency. Musculoskeletal:  Negative for gait problem and myalgias. Skin:  Positive for rash. Negative for wound. Neurological:  Negative for dizziness, weakness, light-headedness and numbness. Hematological:  Does not bruise/bleed easily. Prior to Visit Medications    Medication Sig Taking?  Authorizing Provider   doxycycline (VIBRAMYCIN) 50 MG capsule Take 1 capsule by mouth daily Yes Heber Dave DO Ivermectin (SOOLANTRA) 1 % CREA Apply 1 g topically daily Yes Renee Fitzpatrickmin, DO   Carboxymeth-Cellulose-CitricAc (PLENITY WELCOME KIT) CAPS Take 1 capsule by mouth 3 times daily (before meals) Yes Renee Magallanes, DO   pramipexole (MIRAPEX) 1.5 MG tablet Take 1 tablet by mouth every evening Yes Renee Magallanes, DO   simvastatin (ZOCOR) 40 MG tablet TAKE ONE TABLET BY MOUTH NIGHTLY Yes Rajesh Jacques MD   carbidopa-levodopa (SINEMET)  MG per tablet TAKE 1 TABLET BY MOUTH ONE TIME A DAY Yes Rajesh Jacques MD   cyclobenzaprine (FLEXERIL) 10 MG tablet Take 1 tablet by mouth 2 times daily as needed for Muscle spasms Yes LUKASZ Snatamaria   venlafaxine (EFFEXOR XR) 75 MG extended release capsule TAKE 1 CAPSULE BY MOUTH ONE TIME A DAY Yes Rajesh Jacques MD   Multiple Vitamins-Minerals (MULTIVITAL PO) Take by mouth Yes Historical Provider, MD   cetirizine (ZYRTEC) 10 MG tablet Take 10 mg by mouth daily Yes Historical Provider, MD   meloxicam (MOBIC) 15 MG tablet TAKE 1 TABLET BY MOUTH ONE TIME A DAY  Jorge Alvarez MD        Social History     Tobacco Use    Smoking status: Former     Packs/day: 0.50     Years: 25.00     Pack years: 12.50     Types: Cigarettes     Start date: 1985     Quit date: 2018     Years since quittin.2    Smokeless tobacco: Never    Tobacco comments:     10 year hiatus from smoking during those years   Substance Use Topics    Alcohol use: Not Currently     Comment: occasionally        Vitals:    10/11/22 0953   BP: 127/88   Pulse: 83   Temp: 97.7 °F (36.5 °C)   TempSrc: Temporal   Weight: 198 lb 1.6 oz (89.9 kg)   Height: 5' 6\" (1.676 m)     Estimated body mass index is 31.97 kg/m² as calculated from the following:    Height as of this encounter: 5' 6\" (1.676 m). Weight as of this encounter: 198 lb 1.6 oz (89.9 kg). Physical Exam  Vitals reviewed. Constitutional:       Appearance: Normal appearance. She is obese. HENT:      Head: Normocephalic and atraumatic.       Nose: Nose normal.      Mouth/Throat:      Mouth: Mucous membranes are moist.      Pharynx: Oropharynx is clear. Eyes:      Extraocular Movements: Extraocular movements intact. Conjunctiva/sclera: Conjunctivae normal.   Cardiovascular:      Rate and Rhythm: Normal rate and regular rhythm. Pulses: Normal pulses. Heart sounds: Normal heart sounds. Pulmonary:      Effort: Pulmonary effort is normal.      Breath sounds: Normal breath sounds. Abdominal:      General: Abdomen is flat. Bowel sounds are normal.      Palpations: Abdomen is soft. Musculoskeletal:         General: Normal range of motion. Cervical back: Normal range of motion and neck supple. Skin:     General: Skin is warm and dry. Capillary Refill: Capillary refill takes less than 2 seconds. Findings: Rash (Area of the forehead, which is erythematous with areas of flaking skin) present. Neurological:      Mental Status: She is alert. Mental status is at baseline. ASSESSMENT/PLAN:  1. Periorificial dermatitis: Rash of the forehead, which is consistent with her previous rash she had diagnosed by dermatologist.  Will treat identically to have a dermatologist had when diagnosed previously. Follow-up if no improvement. - doxycycline (VIBRAMYCIN) 50 MG capsule; Take 1 capsule by mouth daily  Dispense: 90 capsule; Refill: 1  - Ivermectin (SOOLANTRA) 1 % CREA; Apply 1 g topically daily  Dispense: 30 g; Refill: 5    2. Class 1 obesity due to excess calories without serious comorbidity with body mass index (BMI) of 30.0 to 30.9 in adult: Patient requesting assistance with weight loss. BMI is over 30 and she does have comorbid conditions. Discussed in great detail different requirements for weight loss including calorie restriction. Most medications too expensive for her and I would prefer to avoid Adipex as she will be following up with Dr. Jon Malave, who does not prescribe this medication. We will try Plenity.   She should follow-up with Dr. Chepe Hall for a weight check in 1 month. - Carboxymeth-Cellulose-CitricAc (PLENITY WELCOME KIT) CAPS; Take 1 capsule by mouth 3 times daily (before meals)  Dispense: 90 capsule; Refill: 2    3. RLS (restless legs syndrome): Continues to have persistence of her symptoms. Will increase her Mirapex from 1-1.5. If symptoms continue to fail to respond, would consider nightly gabapentin or referral to neurology. Follow-up with Dr. Chepe Hall in 1 month. - pramipexole (MIRAPEX) 1.5 MG tablet; Take 1 tablet by mouth every evening  Dispense: 30 tablet; Refill: 0    Return in about 4 weeks (around 11/8/2022) for [facility]. An electronic signature was used to authenticate this note.     --Yessi Delgado,  on 10/11/2022 at 10:47 AM

## 2022-10-11 ENCOUNTER — OFFICE VISIT (OUTPATIENT)
Dept: PRIMARY CARE CLINIC | Age: 57
End: 2022-10-11
Payer: COMMERCIAL

## 2022-10-11 VITALS
TEMPERATURE: 97.7 F | DIASTOLIC BLOOD PRESSURE: 88 MMHG | HEIGHT: 66 IN | BODY MASS INDEX: 31.84 KG/M2 | HEART RATE: 83 BPM | SYSTOLIC BLOOD PRESSURE: 127 MMHG | WEIGHT: 198.1 LBS

## 2022-10-11 DIAGNOSIS — G25.81 RLS (RESTLESS LEGS SYNDROME): ICD-10-CM

## 2022-10-11 DIAGNOSIS — L71.0 PERIORIFICIAL DERMATITIS: Primary | ICD-10-CM

## 2022-10-11 DIAGNOSIS — E66.09 CLASS 1 OBESITY DUE TO EXCESS CALORIES WITHOUT SERIOUS COMORBIDITY WITH BODY MASS INDEX (BMI) OF 30.0 TO 30.9 IN ADULT: ICD-10-CM

## 2022-10-11 DIAGNOSIS — L71.9 ROSACEA: ICD-10-CM

## 2022-10-11 PROCEDURE — 99214 OFFICE O/P EST MOD 30 MIN: CPT | Performed by: STUDENT IN AN ORGANIZED HEALTH CARE EDUCATION/TRAINING PROGRAM

## 2022-10-11 RX ORDER — CARBOXYMETHYLCELLULOSE/CITRIC 0.75 G
1 CAPSULE ORAL
Qty: 90 CAPSULE | Refills: 2 | Status: SHIPPED | OUTPATIENT
Start: 2022-10-11

## 2022-10-11 RX ORDER — PRAMIPEXOLE DIHYDROCHLORIDE 1.5 MG/1
1.5 TABLET ORAL EVERY EVENING
Qty: 30 TABLET | Refills: 0 | Status: SHIPPED | OUTPATIENT
Start: 2022-10-11 | End: 2022-11-10

## 2022-10-11 RX ORDER — IVERMECTIN 10 MG/G
1 CREAM TOPICAL DAILY
Qty: 30 G | Refills: 5 | Status: SHIPPED | OUTPATIENT
Start: 2022-10-11

## 2022-10-11 RX ORDER — DOXYCYCLINE HYCLATE 50 MG/1
50 CAPSULE ORAL DAILY
Qty: 90 CAPSULE | Refills: 1 | Status: SHIPPED | OUTPATIENT
Start: 2022-10-11

## 2022-10-11 ASSESSMENT — ENCOUNTER SYMPTOMS
ABDOMINAL PAIN: 0
CHEST TIGHTNESS: 0
DIARRHEA: 0
NAUSEA: 0
SHORTNESS OF BREATH: 0
ABDOMINAL DISTENTION: 0

## 2022-10-14 ENCOUNTER — OFFICE VISIT (OUTPATIENT)
Dept: DERMATOLOGY | Age: 57
End: 2022-10-14
Payer: COMMERCIAL

## 2022-10-14 DIAGNOSIS — D22.60 MULTIPLE BENIGN MELANOCYTIC NEVI OF UPPER EXTREMITY, LOWER EXTREMITY, AND TRUNK: ICD-10-CM

## 2022-10-14 DIAGNOSIS — L81.4 LENTIGINES: ICD-10-CM

## 2022-10-14 DIAGNOSIS — Z13.220 SCREENING CHOLESTEROL LEVEL: Primary | ICD-10-CM

## 2022-10-14 DIAGNOSIS — D22.70 MULTIPLE BENIGN MELANOCYTIC NEVI OF UPPER EXTREMITY, LOWER EXTREMITY, AND TRUNK: ICD-10-CM

## 2022-10-14 DIAGNOSIS — D22.5 MULTIPLE BENIGN MELANOCYTIC NEVI OF UPPER EXTREMITY, LOWER EXTREMITY, AND TRUNK: ICD-10-CM

## 2022-10-14 DIAGNOSIS — L43.8 ORAL LICHEN PLANUS: ICD-10-CM

## 2022-10-14 DIAGNOSIS — L71.8 OTHER ROSACEA: Primary | ICD-10-CM

## 2022-10-14 PROCEDURE — 99203 OFFICE O/P NEW LOW 30 MIN: CPT | Performed by: INTERNAL MEDICINE

## 2022-10-14 NOTE — PATIENT INSTRUCTIONS
Thank you for visiting [facility] today!  Please follow the instructions below as we discussed in clinic:      Start Cerave anti-lotion twice a day as you need from the store  Start doxycycline 100mg twice a day with food for 2 weeks and then decrease to 50mg daily  Start Soolantra for face  Continue lidex cream twice a day as needed for lichen planus of gums

## 2022-10-14 NOTE — PROGRESS NOTES
[facility]  Osiel Levin MD  [phone]    Date of Visit: 10/14/2022    Alexis Ly is a 62 y.o. female who presents for skin lesions. New pt    Chief Complaint:   Chief Complaint   Patient presents with    Skin Exam     Rash forehead        History of Present Illness:    Concerns: Perioral dermatitis/acne rosacea on lower face in past, feels on upper face now  Duration: Upper face flare x few months  Exacerbating factors: Stress  Previous treatment:   -Soolantra cream+ doxycycline 50mg refilled on 10/11  Effect of trmt: No improvement yet    Concerns: Oral lichen planus  Location: Sore gums  Previous treatment: Lidex cream BID PRN (uses infrequently, helps)  Other hx: Biopsy proven by dental surgeon    *Alecia Rod Carrier in 2019 for perioral dermatitis/rosacea /sp doxy    *Personal history of skin cancer: None  *Family history of skin cancer: None     Review of Systems:  Gen: Feels well, good sense of health. Skin: No new or changing moles, no history of keloids or hypertrophic scars. Past Medical History, Family History, Surgical History, Medications and Allergies reviewed.     Past Medical History:   Diagnosis Date    ADHD (attention deficit hyperactivity disorder) 2010    ADD    Allergic rhinitis 2000    Itching    Anxiety     Breast cancer ([address]     Breast Cancer - right breast ([facility] [address]     Dr. Eloy Murray: pN0 January 2021: ER/[place]+, HER2 Neg :  Aromatase Inhibitor tx (will need DEXA)    Hypercholesteremia     Obstructive sleep apnea (adult) (pediatric)     APAP 10-18 (ave 12 cwp)    Osteoarthritis 2022    Knees    PONV (postoperative nausea and vomiting)     Restless leg     Sleep apnea     uses cpap     Past Surgical History:   Procedure Laterality Date    ACHILLES TENDON SURGERY Right     BREAST BIOPSY Right 01/26/2021    RIGHT SENTINEL LYMPH NODE BIOPSY performed by Vesta Taylor DO at [address]  10/29/2020    BREAST REDUCTION SURGERY Bilateral 10/29/2020 BILATERAL BREAST REDUCTION AND SPY performed by Alyx Valles MD at [facility]. 06/11/2020    COLORECTAL CANCER SCREENING, HIGH RISK performed by Tyshawn Camilo MD at [address] ([address] [place] [address])      HYSTERECTOMY, VAGINAL  2002    DUB:  Dr. Rosette Vinson still in    [address]  2022    Root Repair    KNEE ARTHROSCOPY Left 07/28/2022    LEFT KNEE ARTHROSCOPY, MEDIAL MENISCUS, ROOT REPAIR, SYNOVECTOMY, CHONDROPLASTY performed by Carina Polo MD at [address]       No Known Allergies  Outpatient Medications Marked as Taking for the 10/14/22 encounter (Office Visit) with Viktoriya Gutierrez MD   Medication Sig Dispense Refill    doxycycline (VIBRAMYCIN) 50 MG capsule Take 1 capsule by mouth daily 90 capsule 1    Ivermectin (SOOLANTRA) 1 % CREA Apply 1 g topically daily 30 g 5    Carboxymeth-Cellulose-CitricAc (PLENITY WELCOME KIT) CAPS Take 1 capsule by mouth 3 times daily (before meals) 90 capsule 2    pramipexole (MIRAPEX) 1.5 MG tablet Take 1 tablet by mouth every evening 30 tablet 0    simvastatin (ZOCOR) 40 MG tablet TAKE ONE TABLET BY MOUTH NIGHTLY 90 tablet 3    carbidopa-levodopa (SINEMET)  MG per tablet TAKE 1 TABLET BY MOUTH ONE TIME A DAY 90 tablet 3    cyclobenzaprine (FLEXERIL) 10 MG tablet Take 1 tablet by mouth 2 times daily as needed for Muscle spasms 28 tablet 0    venlafaxine (EFFEXOR XR) 75 MG extended release capsule TAKE 1 CAPSULE BY MOUTH ONE TIME A DAY 90 capsule 3    Multiple Vitamins-Minerals (MULTIVITAL PO) Take by mouth      cetirizine (ZYRTEC) 10 MG tablet Take 10 mg by mouth daily             Physical Examination   No acute distress. Mood clear/affect appropriate. Alert and oriented. Mucous membranes moist.  Sclera anicteric.     Full body skin exam was conducted to include the scalp, face, lips/teeth, lids/conjunctiva, ears, neck, chest, abdomen, back, buttock, right and left hands and forearms, right and left leg and feet and was normal with the following exceptions:   -Pink ill defined patches with overlying pustules + papules on forehead   -Mild erythema of R upper gum with suresh's striae  - On trunk and bilateral upper and lower extremities, there are multiple well-circumscribed, homogenous tan to brown macules and papules  - Multiple tan to light brown macules on face, shoulders and arms in a photo-distributed pattern        Assessment and Plan     1. Other rosacea, forehead, acne rosacea/ET type--flaring  - Discussed diagnosis, typical course, and treatment options  - Start DCN 100mg BID (SE discussed)--can use prescription just given, but then do 100mg BID x 2 weeks and decrease to 50mg daily after that   - Restart Soolantra cream BID   - Avoid triggers (sun, stress, hot liquids, caffeine, wine, spicy foods)  - Encourage strict sun protection with SPF 30+, handout provided    2. Oral lichen planus, gums--biopsy proven; active on R gum  -Continue lidex cream BID PRN     3. Multiple benign melanocytic nevi of upper extremity, lower extremity, and trunk  - Benign, reassurance  - Counseled on importance of daily sun protection (Broad spectrum, SPF >30), monthly self skin exams  - Reviewed ABCDEs of melanoma  - Sun screen hand out provided  . 4. Lentigines  -Benign, reassurance   - Reviewed relationship with sun exposure  - Rec daily sunscreen with SPF 30, broad spectrum       Return in about 3 months (around 1/14/2023). To recheck LP and rosacea    Note is transcribed using voice recognition software. Inadvertent computerized transcription errors may be present.     Fabian Barlow MD

## 2022-10-17 ENCOUNTER — TELEPHONE (OUTPATIENT)
Dept: PRIMARY CARE CLINIC | Age: 57
End: 2022-10-17

## 2022-10-17 NOTE — TELEPHONE ENCOUNTER
Carboxymeth-Cellulose-CitricAc (PLENITY WELCOME KIT) CAPS [[id number]]     Order Details  Dose: 1 capsule Route: Oral Frequency: 3 TIMES DAILY BEFORE MEALS   Dispense Quantity: 90 capsule Refills: 2          Sig: Take 1 capsule by mouth 3 times daily (before meals)         Start Date: 10/11/22 End Date: --   Written Date: 10/11/22 Expiration Date: 10/11/23       Diagnosis Association: Class 1 obesity due to excess calories without serious comorbidity with body mass index (BMI) of 30.0 to 30.9 in adult (E66.09 , Z68.30)   Providers    Authorizing Provider: Naheed Choi DO NPI: [id number]   Ordering User:  Naheed Choi DO          Pharmacy    [address] [phone]  [address] - P [phone] - F [phone]   [address] [place] [address]   Phone:  [phone]  Fax:  [phone]

## 2022-10-17 NOTE — TELEPHONE ENCOUNTER
I Have spoke to pt letting her know that her lab work needs to be done before completion of form ([facility]). She stats she will go to [facility] if not [facility] to get the labs done to complete her form im still waiting with form behind my scanner.

## 2022-10-20 DIAGNOSIS — Z13.220 SCREENING CHOLESTEROL LEVEL: ICD-10-CM

## 2022-10-20 LAB
A/G RATIO: 1.8 (ref 1.1–2.2)
ALBUMIN SERPL-MCNC: 4.5 G/DL (ref 3.4–5)
ALP BLD-CCNC: 109 U/L (ref 40–129)
ALT SERPL-CCNC: 23 U/L (ref 10–40)
ANION GAP SERPL CALCULATED.3IONS-SCNC: 11 MMOL/L (ref 3–16)
AST SERPL-CCNC: 29 U/L (ref 15–37)
BILIRUB SERPL-MCNC: <0.2 MG/DL (ref 0–1)
BUN BLDV-MCNC: 15 MG/DL (ref 7–20)
CALCIUM SERPL-MCNC: 10 MG/DL (ref 8.3–10.6)
CHLORIDE BLD-SCNC: 102 MMOL/L (ref 99–110)
CHOLESTEROL, TOTAL: 233 MG/DL (ref 0–199)
CO2: 29 MMOL/L (ref 21–32)
CREAT SERPL-MCNC: 1 MG/DL (ref 0.6–1.1)
GFR SERPL CREATININE-BSD FRML MDRD: >60 ML/MIN/{1.73_M2}
GLUCOSE BLD-MCNC: 102 MG/DL (ref 70–99)
HDLC SERPL-MCNC: 45 MG/DL (ref 40–60)
LDL CHOLESTEROL CALCULATED: 148 MG/DL
POTASSIUM SERPL-SCNC: 4.6 MMOL/L (ref 3.5–5.1)
SODIUM BLD-SCNC: 142 MMOL/L (ref 136–145)
TOTAL PROTEIN: 7 G/DL (ref 6.4–8.2)
TRIGL SERPL-MCNC: 202 MG/DL (ref 0–150)
VLDLC SERPL CALC-MCNC: 40 MG/DL

## 2022-10-20 NOTE — RESULT ENCOUNTER NOTE
Good Afternoon Sonia Mejia ,    Thank you for getting your blood work drawn. Your lab results are back and your cholesterol looks a little high. Are you taking your cholesterol medication? If you are going to want to change it.         Send me a message and we will proceed from there,    Dr. Kristofer Feldman      For your convenience I have listed your future appointment(s) below:  Future Appointments  11/3/2022  8:40 AM    [facility] SLEEP         [facility]  12/1/2022  1:40 PM    [facility]        [facility]  12/12/2022 7:30 AM    MAMMOGRAPHY MOB ROOM 3     [employer]    The 10-year ASCVD risk score (Arely TIMMONS, et al., 2019) is: 3.3%    Values used to calculate the score:      Age: 62 years      Sex: Female      Is Non- : No      Diabetic: No      Tobacco smoker: No      Systolic Blood Pressure: 525 mmHg      Is BP treated: No      HDL Cholesterol: 45 mg/dL      Total Cholesterol: 233 mg/dL

## 2022-10-24 ENCOUNTER — PATIENT MESSAGE (OUTPATIENT)
Dept: PRIMARY CARE CLINIC | Age: 57
End: 2022-10-24

## 2022-10-24 DIAGNOSIS — E78.00 HYPERCHOLESTEREMIA: Primary | ICD-10-CM

## 2022-10-24 RX ORDER — ROSUVASTATIN CALCIUM 10 MG/1
10 TABLET, COATED ORAL NIGHTLY
Qty: 30 TABLET | Refills: 3 | Status: SHIPPED | OUTPATIENT
Start: 2022-10-24

## 2022-10-24 NOTE — TELEPHONE ENCOUNTER
"Encounter Date: 7/7/2018    SCRIBE #1 NOTE: I, Alexia Pitts, am scribing for, and in the presence of,  Dr. Mcdonald. I have scribed the following portions of the note - the APC attestation.       History     Chief Complaint   Patient presents with    Rectal Bleeding     on clindamycin for toothache since last weekend, brown stool with blood in it     Ms Leung is a 51YOAAF who presents with initial episode of rectal bleeding this morning, pertinent PMHx remote PE with history of OAU which she is no longer using. Patient has been taking Clindamycin for dental abscess since 6/29. She endorses non-compliance with pill schedule d/t stomach upset. She noticed bright red specks in BM yesterday but attributed this to "eating lots of cherries". Today, she had one episode of loose stool with "dark red blood" in toilet water and upon wiping. No melena. She denies known history of colonic polyps nor internal hemorrhoids. She denies dizziness, LOC, CP, SOB, abdominal pain, N/V, fever, chills, recent travel. She states her dental abscess and associated facial swelling are significantly improved. She is not taking probiotics.           Review of patient's allergies indicates:  No Known Allergies  Past Medical History:   Diagnosis Date    Anticoagulant long-term use     [id number]    Pulmonary embolism     2012     History reviewed. No pertinent surgical history.  History reviewed. No pertinent family history.  Social History   Substance Use Topics    Smoking status: Never Smoker    Smokeless tobacco: Never Used    Alcohol use No     Review of Systems   Constitutional: Negative for appetite change, chills, diaphoresis, fatigue and fever.   HENT: Negative for congestion. Dental problem: significantly improved. Facial swelling: significantly improved.    Respiratory: Negative for cough, shortness of breath and wheezing.    Cardiovascular: Negative for chest pain, palpitations and leg swelling.   Gastrointestinal: Positive " From: Joann Maynard  To: Dr. Isaac Bae: 10/24/2022 8:36 AM EDT  Subject: High Cholesterol    Good Morning,    I'm still taking the Simvastatin as ordered. What are your thoughts on my test results?     Mirian Butler "for anal bleeding and blood in stool. Negative for abdominal distention, constipation, diarrhea, nausea, rectal pain and vomiting. Abdominal pain:  "cramping" only prior to defecation.   Genitourinary: Negative for dysuria, flank pain, hematuria and urgency.   Musculoskeletal: Negative for arthralgias, back pain and myalgias.   Skin: Negative for pallor, rash and wound.   Allergic/Immunologic: Negative for immunocompromised state.   Neurological: Negative for dizziness, syncope, weakness, light-headedness and headaches.   Psychiatric/Behavioral: Negative for agitation and confusion. The patient is not nervous/anxious.        Physical Exam     Initial Vitals [07/07/18 1627]   BP Pulse Resp Temp SpO2   (!) 186/86 85 18 98.8 °F (37.1 °C) 100 %      MAP       --         Physical Exam    Vitals reviewed.  Constitutional: She appears well-developed and well-nourished. She is not diaphoretic. No distress.   Well-appearing female in NAD, VSS, afebrile, 100% on RA.   HENT:   Head: Normocephalic and atraumatic.   Right Ear: External ear normal.   Left Ear: External ear normal.   Nose: Nose normal.   Mouth/Throat: Oropharynx is clear and moist.   Eyes: Conjunctivae and EOM are normal. Pupils are equal, round, and reactive to light. No scleral icterus.   Neck: Normal range of motion. Neck supple.   Cardiovascular: Normal rate, regular rhythm, normal heart sounds and intact distal pulses.   No murmur heard.  Pulmonary/Chest: Breath sounds normal. No respiratory distress. She has no wheezes. She has no rhonchi. She has no rales.   Abdominal: Soft. She exhibits no distension and no mass. There is no tenderness. There is no rebound and no guarding.   Hypoactive BS x 4.   Genitourinary: Rectal exam shows guaiac positive stool. Guaiac positive stool. : Acceptable.  Genitourinary Comments: External hemorrhoids on exam. No tarik blood or stool in rectal vault. Guaiac positive. No ano fissure appreciated. "   Musculoskeletal: Normal range of motion. She exhibits no edema or tenderness.   Neurological: She is alert and oriented to person, place, and time. She has normal strength. No cranial nerve deficit or sensory deficit.   CN 2-12 grossly intact with no focal nor global neuro deficit.    Skin: Skin is warm and dry. Capillary refill takes less than 2 seconds. No erythema. No pallor.   Psychiatric: She has a normal mood and affect. Her behavior is normal. Judgment and thought content normal.         ED Course   Procedures  Labs Reviewed   CBC W/ AUTO DIFFERENTIAL - Abnormal; Notable for the following:        Result Value    RBC 3.89 (*)     Hemoglobin 11.1 (*)     Hematocrit 35.2 (*)     MCHC 31.5 (*)     All other components within normal limits   COMPREHENSIVE METABOLIC PANEL - Abnormal; Notable for the following:     Alkaline Phosphatase 147 (*)     All other components within normal limits   CLOSTRIDIUM DIFFICILE          Imaging Results    None          Medical Decision Making:   History:   Old Medical Records: I decided to obtain old medical records.  Initial Assessment:   Patient presents with initial episode of blood in stool and diarrhea with no associated symptoms. Tail end of clindamycin Rx for dental abscess. VSS. Guaiac positive. Denies melena.  Differential Diagnosis:   DDX internal hemorrhoids, colonic polyp, diverticulosis. Physical exam and history taking lower clinical suspicion for C diff colitis, toxic megacolon, erosive gastritis, PUD.  Clinical Tests:   Lab Tests: Ordered and Reviewed  ED Management:  Guaiac positive. Labs unremarkable (she reveals history of anemia). D/t current abx use, we will obtain stool sample for C diff toxin and f/u with patient pending results. I considered but do not suspect emergent etiology of patient symptoms including infectious diarrhea and toxic megacolon, likely benign isolated event though we will follow stool culture. We discussed f/u with PCP next week. She  agreed with plan of care and voiced understanding. Anuradha Garcia PA-C, will oversee patient discharge at shift change 1900.    Angelita Hollingsworth PA-C  07/07/2018    I discussed the following case, diagnosis and plan of care with attending physician.         APC / Resident Notes:   7:18 PM  I, Anuradha Garcia PA-C, have assumed care of this patient from Angelita Hollingsworth PA-C pending stool studies. Will continue to monitor.     C-diff study pending.     Patient reassessed, reports symptoms improved with ED management. I have discussed emergency department findings, and plan with the patient. Will discharge home with probiotic as directed by Angelita Hollingsworth PA-C and F/U with PCP. Patient verbalizes understanding of plan and agrees. Return precautions given.   8:02 PM  Anuradha Garcia PA-C       Scribe Attestation:   Scribe #1: I performed the above scribed service and the documentation accurately describes the services I performed. I attest to the accuracy of the note.    Attending Attestation:     Physician Attestation Statement for NP/PA:   I have conducted a face to face encounter with this patient in addition to the NP/PA, due to Medical Complexity            Clinical Impression:   The primary encounter diagnosis was Blood in stool. A diagnosis of External hemorrhoid was also pertinent to this visit.      Disposition:   Disposition: Discharged  Condition: Stable                        Angelita Hollingsworth PA-C  07/07/18 1938       Anuradha Garcia PA-C  07/07/18 2003       Bernardo Mcdonald MD  07/07/18 2011

## 2022-11-03 ENCOUNTER — OFFICE VISIT (OUTPATIENT)
Dept: PULMONOLOGY | Age: 57
End: 2022-11-03
Payer: COMMERCIAL

## 2022-11-03 VITALS
OXYGEN SATURATION: 96 % | DIASTOLIC BLOOD PRESSURE: 80 MMHG | WEIGHT: 195 LBS | HEART RATE: 86 BPM | HEIGHT: 66 IN | SYSTOLIC BLOOD PRESSURE: 118 MMHG | BODY MASS INDEX: 31.34 KG/M2

## 2022-11-03 DIAGNOSIS — E66.9 OBESITY (BMI 30.0-34.9): ICD-10-CM

## 2022-11-03 DIAGNOSIS — G47.33 OBSTRUCTIVE SLEEP APNEA: Primary | ICD-10-CM

## 2022-11-03 DIAGNOSIS — G25.81 RLS (RESTLESS LEGS SYNDROME): ICD-10-CM

## 2022-11-03 PROBLEM — E66.811 OBESITY (BMI 30.0-34.9): Status: ACTIVE | Noted: 2022-11-03

## 2022-11-03 PROCEDURE — 99214 OFFICE O/P EST MOD 30 MIN: CPT | Performed by: NURSE PRACTITIONER

## 2022-11-03 ASSESSMENT — SLEEP AND FATIGUE QUESTIONNAIRES
HOW LIKELY ARE YOU TO NOD OFF OR FALL ASLEEP WHILE SITTING INACTIVE IN A PUBLIC PLACE: 0
HOW LIKELY ARE YOU TO NOD OFF OR FALL ASLEEP WHILE LYING DOWN TO REST IN THE AFTERNOON WHEN CIRCUMSTANCES PERMIT: 0
HOW LIKELY ARE YOU TO NOD OFF OR FALL ASLEEP WHILE SITTING AND TALKING TO SOMEONE: 0
HOW LIKELY ARE YOU TO NOD OFF OR FALL ASLEEP IN A CAR, WHILE STOPPED FOR A FEW MINUTES IN TRAFFIC: 0
HOW LIKELY ARE YOU TO NOD OFF OR FALL ASLEEP WHILE WATCHING TV: 0
HOW LIKELY ARE YOU TO NOD OFF OR FALL ASLEEP WHILE SITTING QUIETLY AFTER LUNCH WITHOUT ALCOHOL: 0
HOW LIKELY ARE YOU TO NOD OFF OR FALL ASLEEP WHILE SITTING AND READING: 0
HOW LIKELY ARE YOU TO NOD OFF OR FALL ASLEEP WHEN YOU ARE A PASSENGER IN A CAR FOR AN HOUR WITHOUT A BREAK: 0
ESS TOTAL SCORE: 0

## 2022-11-03 NOTE — ASSESSMENT & PLAN NOTE
Chronic-Stable: Reviewed and analyzed results of physiologic download from patient's machine and reviewed with patient. Supplies and parts as needed for her machine. These are medically necessary. Limit caffeine use after 3pm. Based on the analyzed data will continue with current settings. Stable on her machine at current settings, getting benefit from the use, and having minimal side effects. Will place order for new machine. Her machine is over 11years old, the humidifier is not working properly, and needs replaced. Machine is medically necessary and she is gaining benefit from machine use. Will also send prescription for travel machine to [email or web address]. Discussed [employer] with patient and at this time she is not interested. Will see her back between 31-90 days for new machine compliance. Encouraged her to contact the office with any questions or concerns.

## 2022-11-03 NOTE — PROGRESS NOTES
Diagnosis: [x] TANYA (G47.33) [] CSA (G47.31) [] Apnea (G47.30)   Length of Need: [x] 15 Months [] 99 Months [] Other:   Machine (GISEL!): [] Respironics Dream Station      Auto [x] ResMed AirSense 11 Auto with modem for remote monitoring [] Other:     [x]  CPAP () [] Bilevel ()   Mode: [x] Auto [] Spontaneous    Mode: [] Auto [] Spontaneous          APAP - Settings  Pressure Min: 10 cmH2O  Pressure Max: 18 cmH2O               Comfort Settings: Ramp Pressure: 5 cmH2O  Ramp time: 15 min  Flex/EPR - 3 full time                                  For ResMed Bileve (TiMax-4 sec   TiMin- 0.2 sec)     Humidifier: [x] Heated ()        [x] Water chamber replacement ()/ 1 per 6 months        Mask:   [x] Nasal () /1 per 3 months [] Full Face () /1 per 3 months   [x] Patient choice -Size and fit mask [] Patient Choice - Size and fit mask   [] Dispense: [] Dispense:   [x] Headgear () / 1 per 3 months [] Headgear () / 1 per 3 months   [x] Replacement Nasal Cushion ()/2 per month [] Interface Replacement ()/1 per month   [] Replacement Nasal Pillows ()/2 per month         Tubing: [x] Heated ()/1 per 3 months    [] Standard ()/1 per 3 months [] Other:           Filters: [x] Non-disposable ()/1 per 6 months     [x] Ultra-Fine, Disposable ()/2 per month        Miscellaneous: [] Chin Strap ()/ 1 per 6 months [] O2 bleed-in:        LPM   [] Oxymetry on CPAP/Bilevel []  Other:         Start Order Date: 11/03/22    MEDICAL JUSTIFICATION:  I, the undersigned, certify that the above prescribed supplies are medically necessary for this patients wellbeing. In my opinion, the supplies are both reasonable and necessary in reference to accepted standards of medicalpractice in treatment of this patients condition.     Paloma Thurston NP    NPI: [id number]       Order Signed Date: 11/03/22  [facility]  Pulmonary, Sleep, and Critical Care    Pulmonary, Sleep, and Critical Care  [id number] [facility]. [address] [facility], [address] [facility] , [address]                                    Allen Salas  Phone: [phone]    Fax: [address]  1965  [facility] [place]  [phone] (home)   [phone] (mobile)      Insurance Info (confirm with patient if correct):  Payer/Plan Subscr  Sex Relation Sub.  Ins. ID Effective Group Num

## 2022-11-03 NOTE — PROGRESS NOTES
Miriam Mason [facility]  [address] [facility], [address] [facility]- [phone]   [facility] Prabhu Smith. [address] [place] [address]. Micha Butt [address] [phone] SLEEP MEDICINE  [address] [place] [id number] [phone]      Assessment/Plan:      1. Obstructive sleep apnea  Assessment & Plan:  Chronic-Stable: Reviewed and analyzed results of physiologic download from patient's machine and reviewed with patient. Supplies and parts as needed for her machine. These are medically necessary. Limit caffeine use after 3pm. Based on the analyzed data will continue with current settings. Stable on her machine at current settings, getting benefit from the use, and having minimal side effects. Will place order for new machine. Her machine is over 11years old, the humidifier is not working properly, and needs replaced. Machine is medically necessary and she is gaining benefit from machine use. Will also send prescription for travel machine to [email or web address]. Discussed Inspire with patient and at this time she is not interested. Will see her back between 31-90 days for new machine compliance. Encouraged her to contact the office with any questions or concerns. 2. RLS (restless legs syndrome)  Assessment & Plan:   Chronic- Stable. Discussed the importance of treating obstructive sleep apnea as part of the management of this disorder. She continues to take Pramipexole 1.5 mg each night. She reports her symptoms are controlled at the current dose and she denies side effects. Medication is currently being managed by her PCP. Discussed trying the medications about an hour before symptoms begin to help prevent them from occurring initially. Discussed speaking with her PCP to see if there is another antidepressant she could maybe switch to that has less risk to exacerbate RLS.  Discussed also considering iron supplementation, avoiding caffeine, and alcohol. 3. Obesity (BMI 30.0-34. 9)  Assessment & Plan:  Chronic-not stable:  Discussed importance of treating obstructive sleep apnea and getting sufficient sleep to assist with weight control. Encouraged her to work on weight loss through diet and exercise. Recommended DASH or Mediterranean diets. Reviewed, analyzed, and documented physiologic data from patient's PAP machine. This information was analyzed to assess complexity and medical decision making in regards to further testing and management. The primary encounter diagnosis was Obstructive sleep apnea. Diagnoses of RLS (restless legs syndrome) and Obesity (BMI 30.0-34.9) were also pertinent to this visit. The chronic medical conditions listed are directly related to the primary diagnosis listed above. The management of the primary diagnosis affects the secondary diagnosis and vice versa. Subjective:   Subjective   Patient ID: Thony Arreola is a 62 y.o. female. Chief Complaint   Patient presents with    Sleep Apnea       HPI:  Machine Modem/Download Info:  Compliance (hours/night): 6.41 hrs/night  % of nights >= 4 hrs: 92 %  Download AHI (/hour): 2.7 /HR  Average CPAP Pressure : 12.1 cmH2O      APAP - Settings  Pressure Min: 10 cmH2O  Pressure Max: 18 cmH2O                 Comfort Settings  Flex/EPR (0-3): 3 PAP Mask  Mask Type: Nasal mask     Thony Arreola presents today for follow up for sleep apnea. She was last seen in the office on 12/11/2019. She reports she is doing well with her machine. She is having trouble with the humidifier not working properly and would like to have a prescription for a new machine. The pressure on her machine is comfortable and she is waking rested. Sometimes has trouble with nasal congestion and will sit up in her bed until it decreases and then puts her mask on. She has tried adjusting the moisture settings on her machine with some improvement.  she denies headaches, nosebleeds, dryness, aerophagia, or drowsiness while driving. Her mask is comfortable and is fitting well. She is interested in learning more about Inspire. She is also wanting a travel machine. RLS: She continues to take Pramipexole 1.5 mg and Sinemet 25 mg each night after dinner. Her symptoms start around 6-7 pm. She may have noticed increased symptoms after she started antidepressant medications. She reports her symptoms are controlled at the current dose and she denies side effects. Medication is currently being managed by her PCP.     [facility]    Saffell - Saffell Sleepiness Score: 0    Social History     Socioeconomic History    Marital status: Single     Spouse name: Anthony [phone])     Number of children: 0    Years of education: Not on file    Highest education level: Not on file   Occupational History    Occupation: [employer]   Tobacco Use    Smoking status: Former     Packs/day: 0.50     Years: 25.00     Pack years: 12.50     Types: Cigarettes     Start date: 1985     Quit date: 2018     Years since quittin.3    Smokeless tobacco: Never    Tobacco comments:     10 year hiatus from smoking during those years   Vaping Use    Vaping Use: Never used   Substance and Sexual Activity    Alcohol use: Not Currently     Comment: occasionally    Drug use: No    Sexual activity: Yes     Partners: Female   Other Topics Concern    Not on file   Social History Narrative    Not on file     Social Determinants of Health     Financial Resource Strain: Low Risk     Difficulty of Paying Living Expenses: Not hard at all   Food Insecurity: No Food Insecurity    Worried About Running Out of Food in the Last Year: Never true    Ran Out of Food in the Last Year: Never true   Transportation Needs: Not on file   Physical Activity: Not on file   Stress: Not on file   Social Connections: Not on file   Intimate Partner Violence: Not on file   Housing Stability: Not on file Current Outpatient Medications   Medication Instructions    carbidopa-levodopa (SINEMET)  MG per tablet TAKE 1 TABLET BY MOUTH ONE TIME A DAY    Carboxymeth-Cellulose-CitricAc (PLENITY WELCOME KIT) CAPS 1 capsule, Oral, 3 TIMES DAILY BEFORE MEALS    cetirizine (ZYRTEC) 10 mg, Oral, DAILY    cyclobenzaprine (FLEXERIL) 10 mg, Oral, 2 TIMES DAILY PRN    doxycycline (VIBRAMYCIN) 50 mg, Oral, DAILY    Ivermectin (SOOLANTRA) 1 g, Apply externally, DAILY    Multiple Vitamins-Minerals (MULTIVITAL PO) Oral    pramipexole (MIRAPEX) 1.5 mg, Oral, EVERY EVENING    rosuvastatin (CRESTOR) 10 mg, Oral, NIGHTLY    simvastatin (ZOCOR) 40 MG tablet TAKE ONE TABLET BY MOUTH NIGHTLY    venlafaxine (EFFEXOR XR) 75 MG extended release capsule TAKE 1 CAPSULE BY MOUTH ONE TIME A DAY          Vitals:  Weight BMI   Wt Readings from Last 3 Encounters:   11/03/22 195 lb (88.5 kg)   10/11/22 198 lb 1.6 oz (89.9 kg)   09/14/22 190 lb (86.2 kg)    Body mass index is 31.47 kg/m².      BP HR SaO2   BP Readings from Last 3 Encounters:   11/03/22 118/80   10/11/22 127/88   07/28/22 115/77    Pulse Readings from Last 3 Encounters:   11/03/22 86   10/11/22 83   07/28/22 (!) 106    SpO2 Readings from Last 3 Encounters:   11/03/22 96%   07/28/22 93%   07/21/22 95%        Electronically signed by TASH Garcia on 11/3/2022 at 9:51 AM

## 2022-11-03 NOTE — PROGRESS NOTES
Diagnosis: [x] TANYA (G47.33) [] CSA (G47.31) [] Apnea (G47.30)   Length of Need: [x] 15 Months [] 99 Months [] Other:   Machine (GISEL!): [] Respironics Dream Station      Auto [x] [facility] mini     Auto travel machine [] Other:     [x]  CPAP () [] Bilevel ()   Mode: [x] Auto [] Spontaneous    Mode: [] Auto [] Spontaneous          APAP - Settings  Pressure Min: 10 cmH2O  Pressure Max: 18 cmH2O               Comfort Settings: Ramp Pressure: 5 cmH2O  Ramp time: 15 min  Flex/EPR - 3 full time                                  For ResMed Bileve (TiMax-4 sec   TiMin- 0.2 sec)     Humidifier: [x] Heated ()        [x] Water chamber replacement ()/ 1 per 6 months        Mask:   [x] Nasal () /1 per 3 months [] Full Face () /1 per 3 months   [x] Patient choice -Size and fit mask [] Patient Choice - Size and fit mask   [] Dispense: [] Dispense:   [x] Headgear () / 1 per 3 months [] Headgear () / 1 per 3 months   [x] Replacement Nasal Cushion ()/2 per month [] Interface Replacement ()/1 per month   [] Replacement Nasal Pillows ()/2 per month         Tubing: [x] Heated ()/1 per 3 months    [] Standard ()/1 per 3 months [] Other:           Filters: [x] Non-disposable ()/1 per 6 months     [x] Ultra-Fine, Disposable ()/2 per month        Miscellaneous: [] Chin Strap ()/ 1 per 6 months [] O2 bleed-in:        LPM   [] Oxymetry on CPAP/Bilevel []  Other:         Start Order Date: 11/03/22    MEDICAL JUSTIFICATION:  I, the undersigned, certify that the above prescribed supplies are medically necessary for this patients wellbeing. In my opinion, the supplies are both reasonable and necessary in reference to accepted standards of medicalpractice in treatment of this patients condition.     Sarah Brown NP    NPI: [id number]       Order Signed Date: 11/03/22  [address] [facility]  Pulmonary, Sleep, and Critical

## 2022-11-03 NOTE — LETTER
[facility] Sleep Medicine  [place] [address] [address] [place] [address]  Phone: [phone]  Fax: [phone]    November 3, 2022       Patient: Carlos Samson   MR Number: [id number]   YOB: 1965   Date of Visit: 11/3/2022       Valentine Renteria was seen for a follow up visit today. Here is my assessment and plan as well as an attached copy of her visit today:    RLS (restless legs syndrome)   Chronic- Stable. Discussed the importance of treating obstructive sleep apnea as part of the management of this disorder. She continues to take Pramipexole 1.5 mg each night. She reports her symptoms are controlled at the current dose and she denies side effects. Medication is currently being managed by her PCP. Discussed trying the medications about an hour before symptoms begin to help prevent them from occurring initially. Discussed speaking with her PCP to see if there is another antidepressant she could maybe switch to that has less risk to exacerbate RLS. Discussed also considering iron supplementation, avoiding caffeine, and alcohol. Obstructive sleep apnea  Chronic-Stable: Reviewed and analyzed results of physiologic download from patient's machine and reviewed with patient. Supplies and parts as needed for her machine. These are medically necessary. Limit caffeine use after 3pm. Based on the analyzed data will continue with current settings. Stable on her machine at current settings, getting benefit from the use, and having minimal side effects. Will place order for new machine. Her machine is over 11years old, the humidifier is not working properly, and needs replaced. Machine is medically necessary and she is gaining benefit from machine use. Will also send prescription for travel machine to [email or web address]. Discussed Inspire with patient and at this time she is not interested. Will see her back between 31-90 days for new machine compliance.  Encouraged her to contact the office with any questions or concerns. Obesity (BMI 30.0-34. 9)  Chronic-not stable:  Discussed importance of treating obstructive sleep apnea and getting sufficient sleep to assist with weight control. Encouraged her to work on weight loss through diet and exercise. Recommended DASH or Mediterranean diets. If you have questions or concerns, please do not hesitate to call me. I look forward to following Elsie De La Torre along with you.     Sincerely,    TASH Fallon    CC providers:  Jay Lundberg MD  [address] [place] [address]  [address] [place]

## 2022-11-21 NOTE — TELEPHONE ENCOUNTER
Medication:   Requested Prescriptions     Pending Prescriptions Disp Refills    simvastatin (ZOCOR) 40 MG tablet [Pharmacy Med Name: SIMVASTATIN 40MG TABS] 90 tablet 3     Sig: TAKE ONE TABLET BY MOUTH NIGHTLY       Last Filled:  07/08/21    Patient Phone Number: [phone] (home)     Last appt: 1/21/2021 Return in about 6 months (around 7/21/2021).     Next appt: Visit date not found    Last Lipid:   Lab Results   Component Value Date    CHOL 229 10/02/2020    TRIG 164 10/02/2020    HDL 46 10/02/2020    HDL 68 12/02/2011    LDLCALC 150 10/02/2020 Per Dr. Barnard, unlikely that it is an allergy at this time. With an ORIF surgery, if a reaction were to happen, it would take months to develop an allergy. If patient is still having issues in a few months that align with allergy symptoms, a consult can be made.     Message related to patient. No questions at this time. Appointment cancelled.

## 2022-11-28 ENCOUNTER — TELEPHONE (OUTPATIENT)
Dept: ORTHOPEDIC SURGERY | Age: 57
End: 2022-11-28

## 2022-11-30 ENCOUNTER — OFFICE VISIT (OUTPATIENT)
Dept: ORTHOPEDIC SURGERY | Age: 57
End: 2022-11-30

## 2022-11-30 VITALS — HEIGHT: 66 IN | BODY MASS INDEX: 30.53 KG/M2 | WEIGHT: 190 LBS

## 2022-11-30 DIAGNOSIS — M17.12 PRIMARY OSTEOARTHRITIS OF LEFT KNEE: ICD-10-CM

## 2022-11-30 DIAGNOSIS — Z98.890 S/P LEFT KNEE ARTHROSCOPY: Primary | ICD-10-CM

## 2022-11-30 RX ORDER — MELOXICAM 15 MG/1
15 TABLET ORAL DAILY
Qty: 30 TABLET | Refills: 3 | Status: SHIPPED | OUTPATIENT
Start: 2022-11-30

## 2022-11-30 NOTE — PROGRESS NOTES
Chief Complaint  Follow-up (Left knee. S/p 4 months medial meniscus repair )      History of Present Illness:  Mello Monroe is a pleasant 62 y.o. female returning back to the office for a follow up evaluation of left knee pain. Patient is 4 months status post medial meniscus repair reporting today that she is currently experiencing soreness in her left knee associated with no injury. Pain is exacerbated with weightbearing and going up and down stairs. She denies popping, catching or locking. She denies any feelings of instability or swelling. Patient note that she is able to sleep ok at night. Of note, she is currently doing a home exercise program that has been going ok up until recently. Medical History:  Patient's medications, allergies, past medical, surgical, social and family histories were reviewed and updated as appropriate. Pertinent items are noted in HPI  Review of systems reviewed from Patient History Form dated on 11/30/2022 and available in the patient's chart under the Media tab. Vital Signs: There were no vitals filed for this visit. Neuro: Alert & oriented x 3,  normal,  no focal deficits noted. Normal affect. Eyes: sclera clear  Ears: Normal external ear  Mouth:  No perioral lesions  Pulm: Respirations unlabored and regular  Pulse: Regular rate and rhythm   Skin: Warm, well perfused      Constitutional: In no apparent distress. Normal affect. Alert and oriented X3 and is cooperative. Left  knee exam    Gait: No use of assistive devices. No antalgic gait. Alignment: normal alignment. Inspection/skin: Skin is intact without erythema or ecchymosis. No gross deformity. Palpation: mild tenderness over anterior excision    Range of Motion: There is full range of motion. Strength: Normal quadriceps development. Effusion: small effusion or swelling present. Ligamentous stability: No cruciate or collateral ligament instability.      Neurologic and vascular: Skin is warm and well-perfused. Sensation is intact to light-touch. Special tests: Negative Francisco sign. Radiology:     No new imaging obtained in the office today. Prior imaging reviewed            Assessment :  62 y.o female status post 4 months left knee medial mensicus repair, left knee with osteoarthritis and a baker's cyst.No new  injury     Impression:  Encounter Diagnoses   Name Primary? S/P left knee arthroscopy Yes    Primary osteoarthritis of left knee        Office Procedures:  Orders Placed This Encounter   Procedures    Visco K Heel Shoe Inserts     Patient was prescribed a Visco K Heel Shoe Insert. The left knee  will require protection / support from this orthosis to improve their function. The orthosis will assist in protecting the affected area, provide functional support and facilitate healing. The patient was educated and fit by a healthcare professional with expert knowledge and specialization in brace application while under the direct supervision of the treating physician. Verbal and written instructions for the use of and application of this item were provided. They were instructed to contact the office immediately should the brace result in increased pain, decreased sensation, increased swelling or worsening of the condition. Plan: Pertinent imaging was reviewed. The etiology, natural history, and treatment options for the disorder were discussed. The roles of activity medication, antiinflammatories, injections, bracing, physical therapy, and surgical interventions were all described to the patient and questions were answered. I believe patient current symptoms is more related to her osteoarthritis than her meniscus which is ok. She also has posterior swelling which is more relatable to a small baker's cyst.Patient would benefit from continuation of exercises and voltaren cream to help loosen up the tightness in her knee.  In addition to that we will add in visco heel k inserts to help alleviate some of the pressure. Lastly, we will send in a prescription to her pharmacy for meloxicam. We will see her back in 1 month or sooner for reevaluation. If no improvement then we will consider a steroid injection to help reduce the inflammation. Chalino Watts is in agreement with this plan. All questions were answered to patient's satisfaction and was encouraged to call with any further questions. The patient was advised that NSAID-type medications have several potential side effects that include: gastrointestinal irritation including hemorrhage, renal injury, as well as an increased risk for heart attack and stroke. The patient was asked to take the medication with food and to stop if there is any symptoms of GI upset, including heartburn, nausea, increased gas or diarrhea. I asked the patient to contact their medical provider for vomiting, abdominal pain or black/bloody stools. The patient should have renal function testing per his medical provider periodically if the medication is taken on a regular basis. The patient should be alert for any swelling in the lower extremities and should stop taking the medication immediately and contact their medical provider should this occur. In addition, the patient should stop taking the medication immediately and contact their medical provider should there be any shortness of breath, fatigue and be evaluated in an emergency facility for any chest pain. The patient expresses understanding of these issues and questions were answered.      Total time spent for evaluation, education, and development of treatment plan: 36 minutes    I Martine Morfin CMA, am scribing for and in the presence of Dr. Pratibha Galarza MD.    11/30/22 12:27 PM  Martine Morfin CMA       I attest that I met personally with the patient, performed the described exam, reviewed the radiographic studies and medical records associated with this patient and supervised the services that are described above.      Raymond Jones MD

## 2022-12-01 ENCOUNTER — OFFICE VISIT (OUTPATIENT)
Dept: GYNECOLOGY | Age: 57
End: 2022-12-01

## 2022-12-01 VITALS
SYSTOLIC BLOOD PRESSURE: 128 MMHG | RESPIRATION RATE: 17 BRPM | OXYGEN SATURATION: 98 % | WEIGHT: 197 LBS | DIASTOLIC BLOOD PRESSURE: 80 MMHG | HEART RATE: 105 BPM | BODY MASS INDEX: 30.92 KG/M2 | HEIGHT: 67 IN

## 2022-12-01 DIAGNOSIS — Z01.419 WELL WOMAN EXAM WITH ROUTINE GYNECOLOGICAL EXAM: Primary | ICD-10-CM

## 2022-12-01 PROCEDURE — 99396 PREV VISIT EST AGE 40-64: CPT | Performed by: OBSTETRICS & GYNECOLOGY

## 2022-12-04 ASSESSMENT — ENCOUNTER SYMPTOMS
RESPIRATORY NEGATIVE: 1
ALLERGIC/IMMUNOLOGIC NEGATIVE: 1
EYES NEGATIVE: 1
GASTROINTESTINAL NEGATIVE: 1

## 2022-12-04 NOTE — PROGRESS NOTES
Subjective:      Patient ID: Melly Tran is a 62 y.o. female. Patient is here for annual. Patient in menopause. Gynecologic Exam      Review of Systems   Constitutional: Negative. HENT: Negative. Eyes: Negative. Respiratory: Negative. Cardiovascular: Negative. Gastrointestinal: Negative. Endocrine: Negative. Genitourinary: Negative. Musculoskeletal: Negative. Skin: Negative. Allergic/Immunologic: Negative. Neurological: Negative. Hematological: Negative. Psychiatric/Behavioral: Negative.        Date of Birth 1965  Past Medical History:   Diagnosis Date    ADHD (attention deficit hyperactivity disorder) 2010    ADD    Allergic rhinitis 2000    Itching    Anxiety     Breast cancer ([facility] [address]     Breast Cancer - right breast ([facility] [address]     Dr. Rosenbaum Kiss: pN0 January 2021: ER/OK+, HER2 Neg :  Aromatase Inhibitor tx (will need DEXA)    Hypercholesteremia     Obstructive sleep apnea (adult) (pediatric)     APAP 10-18 (ave 12 cwp)    Osteoarthritis 2022    Knees    PONV (postoperative nausea and vomiting)     Restless leg     Sleep apnea     uses cpap     Past Surgical History:   Procedure Laterality Date    ACHILLES TENDON SURGERY Right     BREAST BIOPSY Right 01/26/2021    RIGHT SENTINEL LYMPH NODE BIOPSY performed by Eugene Mane DO at [address] [facility]  10/29/2020    BREAST REDUCTION SURGERY Bilateral 10/29/2020    BILATERAL BREAST REDUCTION AND SPY performed by Hans Blair MD at [address]    COLONOSCOPY N/A 06/11/2020    COLORECTAL CANCER SCREENING, HIGH RISK performed by Zulma Smith MD at [address] ([address] [place] [address])  2002    supracervical hysterectomy Jonathan Bedoya  2022    Root Repair    KNEE ARTHROSCOPY Left 07/28/2022    LEFT KNEE ARTHROSCOPY, MEDIAL MENISCUS, ROOT REPAIR, SYNOVECTOMY, CHONDROPLASTY performed by Cayden Jordan MD at [address] [facility] History    Para Term  AB Living   0 0 0 0 0 0   SAB IAB Ectopic Molar Multiple Live Births   0 0 0 0 0 0     Social History     Socioeconomic History    Marital status: Single     Spouse name: Manjula Goldberg ([phone] POA)     Number of children: 0    Years of education: Not on file    Highest education level: Not on file   Occupational History    Occupation: [employer]   Tobacco Use    Smoking status: Former     Packs/day: 0.50     Years: 25.00     Pack years: 12.50     Types: Cigarettes     Start date: 1985     Quit date: 2018     Years since quittin.4    Smokeless tobacco: Never    Tobacco comments:     10 year hiatus from smoking during those years   Vaping Use    Vaping Use: Never used   Substance and Sexual Activity    Alcohol use: Not Currently     Comment: occasionally    Drug use: No    Sexual activity: Yes     Partners: Female   Other Topics Concern    Not on file   Social History Narrative    Not on file     Social Determinants of Health     Financial Resource Strain: Low Risk     Difficulty of Paying Living Expenses: Not hard at all   Food Insecurity: No Food Insecurity    Worried About Running Out of Food in the Last Year: Never true    Ran Out of Food in the Last Year: Never true   Transportation Needs: Not on file   Physical Activity: Not on file   Stress: Not on file   Social Connections: Not on file   Intimate Partner Violence: Not on file   Housing Stability: Not on file     No Known Allergies  Outpatient Medications Marked as Taking for the 22 encounter (Office Visit) with Gillian James MD   Medication Sig Dispense Refill    meloxicam (MOBIC) 15 MG tablet Take 1 tablet by mouth daily 30 tablet 3    rosuvastatin (CRESTOR) 10 MG tablet Take 1 tablet by mouth nightly 30 tablet 3    Ivermectin (SOOLANTRA) 1 % CREA Apply 1 g topically daily 30 g 5    Carboxymeth-Cellulose-CitricAc (PLENITY WELCOME KIT) CAPS Take 1 capsule by mouth 3 times daily (before meals) 90 capsule 2    carbidopa-levodopa (SINEMET)  MG per tablet TAKE 1 TABLET BY MOUTH ONE TIME A DAY 90 tablet 3    cyclobenzaprine (FLEXERIL) 10 MG tablet Take 1 tablet by mouth 2 times daily as needed for Muscle spasms 28 tablet 0    venlafaxine (EFFEXOR XR) 75 MG extended release capsule TAKE 1 CAPSULE BY MOUTH ONE TIME A DAY 90 capsule 3    Multiple Vitamins-Minerals (MULTIVITAL PO) Take by mouth      cetirizine (ZYRTEC) 10 MG tablet Take 10 mg by mouth daily       Family History   Problem Relation Age of Onset    Other Mother     High Cholesterol Mother     Obesity Mother     Heart Attack Father 46         at 66    Hypertension Father     Other Brother         bipolar    Mental Illness Brother         Bipolar    Colon Cancer Brother      /80 (Site: Right Upper Arm, Position: Sitting, Cuff Size: Large Adult)   Pulse (!) 105   Resp 17   Ht 5' 7\" (1.702 m)   Wt 197 lb (89.4 kg)   LMP  (LMP Unknown)   SpO2 98%   BMI 30.85 kg/m²       Objective:   Physical Exam  Constitutional:       General: She is not in acute distress. Appearance: Normal appearance. She is well-developed and normal weight. She is not diaphoretic. HENT:      Head: Normocephalic. Nose: Nose normal.      Mouth/Throat:      Mouth: Mucous membranes are moist.      Pharynx: Oropharynx is clear. Eyes:      Extraocular Movements: Extraocular movements intact. Neck:      Thyroid: No thyromegaly. Cardiovascular:      Rate and Rhythm: Normal rate and regular rhythm. Heart sounds: Normal heart sounds. No murmur heard. No friction rub. No gallop. Pulmonary:      Effort: Pulmonary effort is normal. No respiratory distress. Breath sounds: Normal breath sounds. No wheezing or rales. Chest:      Chest wall: No tenderness. Breasts:     Right: No swelling, bleeding, inverted nipple, mass, nipple discharge, skin change or tenderness.       Left: No swelling, bleeding, inverted nipple, mass, nipple discharge, skin change or tenderness. Comments: Scarring bilateral breast from breast reduction  Abdominal:      General: Abdomen is flat. There is no distension. Palpations: Abdomen is soft. There is no hepatomegaly or mass. Tenderness: There is no abdominal tenderness. There is no guarding or rebound. Hernia: No hernia is present. There is no hernia in the left inguinal area or right inguinal area. Genitourinary:     Exam position: Lithotomy position. Labia:         Right: No rash, tenderness, lesion or injury. Left: No rash, tenderness, lesion or injury. Urethra: No prolapse, urethral pain, urethral swelling or urethral lesion. Vagina: Normal. No signs of injury and foreign body. No vaginal discharge, erythema, tenderness, bleeding or lesions. Cervix: No cervical motion tenderness, discharge, friability, lesion, erythema, cervical bleeding or eversion. Adnexa: Right adnexa normal and left adnexa normal.        Right: No mass, tenderness or fullness. Left: No mass, tenderness or fullness. Rectum: Normal. Guaiac result negative. No mass, tenderness, anal fissure, external hemorrhoid or internal hemorrhoid. Normal anal tone. Comments: Normal urethral meatus, nl urethra, nl bladder. Musculoskeletal:         General: No swelling or tenderness. Normal range of motion. Cervical back: Normal range of motion and neck supple. No rigidity. Lymphadenopathy:      Cervical: No cervical adenopathy. Skin:     General: Skin is warm and dry. Coloration: Skin is not jaundiced or pale. Findings: No bruising, erythema, lesion or rash. Neurological:      General: No focal deficit present. Mental Status: She is alert and oriented to person, place, and time. Mental status is at baseline. Deep Tendon Reflexes: Reflexes are normal and symmetric.    Psychiatric:         Mood and Affect: Mood normal.         Behavior: Behavior normal. Thought Content:  Thought content normal.         Judgment: Judgment normal.       Assessment:      Annual  Hx breast cancer  menopause      Plan:      Pap, calcium, exercise, mammogram, hemocut negative  Stable-sees Onc  stable        Flo Dubin, MD

## 2022-12-05 DIAGNOSIS — M17.12 PRIMARY OSTEOARTHRITIS OF LEFT KNEE: Primary | ICD-10-CM

## 2022-12-05 RX ORDER — MELOXICAM 15 MG/1
15 TABLET ORAL DAILY
Qty: 30 TABLET | Refills: 3 | Status: CANCELLED | OUTPATIENT
Start: 2022-12-05

## 2022-12-06 RX ORDER — MELOXICAM 15 MG/1
15 TABLET ORAL DAILY
Qty: 30 TABLET | Refills: 0 | Status: SHIPPED | OUTPATIENT
Start: 2022-12-06

## 2022-12-12 ENCOUNTER — HOSPITAL ENCOUNTER (OUTPATIENT)
Dept: MAMMOGRAPHY | Age: 57
Discharge: HOME | End: 2022-12-12
Payer: COMMERCIAL

## 2022-12-12 DIAGNOSIS — C50.911 MALIGNANT NEOPLASM OF RIGHT FEMALE BREAST, UNSPECIFIED ESTROGEN RECEPTOR STATUS, UNSPECIFIED SITE OF BREAST (HCC): ICD-10-CM

## 2022-12-12 PROCEDURE — 77066 DX MAMMO INCL CAD BI: CPT

## 2022-12-13 ENCOUNTER — PATIENT MESSAGE (OUTPATIENT)
Dept: PRIMARY CARE CLINIC | Age: 57
End: 2022-12-13

## 2022-12-14 NOTE — TELEPHONE ENCOUNTER
From: Bernadette Form  To: Dr. Andrew Shaffer: 12/13/2022 4:18 PM EST  Subject: Denied Refill Request    Hello,    I got a text from [facility] stating that my refill request has been denied by my provider. Their instructions were to contact you for any questions. Can you tell me which medication they are referring to?     Thank you,    Janak Suarez

## 2022-12-28 DIAGNOSIS — M17.9 OSTEOARTHRITIS OF KNEE, UNSPECIFIED LATERALITY, UNSPECIFIED OSTEOARTHRITIS TYPE: Primary | ICD-10-CM

## 2022-12-28 RX ORDER — MELOXICAM 15 MG/1
15 TABLET ORAL DAILY
Qty: 30 TABLET | Refills: 0 | Status: SHIPPED | OUTPATIENT
Start: 2022-12-28

## 2022-12-29 RX ORDER — MELOXICAM 15 MG/1
15 TABLET ORAL DAILY
Qty: 30 TABLET | Refills: 0 | Status: SHIPPED | OUTPATIENT
Start: 2022-12-29

## 2023-01-05 ENCOUNTER — PATIENT MESSAGE (OUTPATIENT)
Dept: PRIMARY CARE CLINIC | Age: 58
End: 2023-01-05

## 2023-01-11 ENCOUNTER — OFFICE VISIT (OUTPATIENT)
Dept: ORTHOPEDIC SURGERY | Age: 58
End: 2023-01-11

## 2023-01-11 VITALS — HEIGHT: 66 IN | BODY MASS INDEX: 30.53 KG/M2 | WEIGHT: 190 LBS

## 2023-01-11 DIAGNOSIS — Z98.890 S/P LEFT KNEE ARTHROSCOPY: ICD-10-CM

## 2023-01-11 DIAGNOSIS — M17.12 PRIMARY OSTEOARTHRITIS OF LEFT KNEE: ICD-10-CM

## 2023-01-11 DIAGNOSIS — M25.562 LEFT KNEE PAIN, UNSPECIFIED CHRONICITY: Primary | ICD-10-CM

## 2023-01-11 DIAGNOSIS — M76.52 PATELLAR TENDINITIS OF LEFT KNEE: ICD-10-CM

## 2023-01-11 NOTE — PROGRESS NOTES
Chief Complaint  Follow-up (Left knee. S/p 5 months medial mensicus repair )      History of Present Illness:  Koki Curtis is a pleasant 62 y.o. female here for follow-up regarding her left knee. She is 5.5-month status post medial meniscus root repair. She was doing well but recently she has had an increase in anterior and posterior knee pain. She experiences swelling, her pain is worse going up and down stairs and getting up from a seated position. She has pain on the anterior aspect of her knee over her patellar tendon. Medical History:  Patient's medications, allergies, past medical, surgical, social and family histories were reviewed and updated as appropriate. Pain Assessment  Location of Pain: Knee  Location Modifiers: Left  Severity of Pain: 3  Quality of Pain: Sharp  Duration of Pain: A few minutes  Frequency of Pain: Intermittent  Aggravating Factors: Stairs (weightbearing)  Limiting Behavior: Yes  Relieving Factors: Rest  Result of Injury: No  Work-Related Injury: No  Are there other pain locations you wish to document?: No  ROS: Review of systems reviewed from Patient History Form completed today and available in the patient's chart under the Media tab. Pertinent items are noted in HPI  Review of systems reviewed from Patient History Form completed today and available in the patient's chart under the Media tab. Vital Signs:  Ht 5' 6\" (1.676 m)   Wt 190 lb (86.2 kg)   LMP  (LMP Unknown)   BMI 30.67 kg/m²       Left knee examination:    Gait: No use of assistive devices. No antalgic gait. Alignment: normal alignment. Inspection/skin: Skin is intact without erythema or ecchymosis. No gross deformity. Palpation: tenderness to palpation over her patellar tendon and inferior pole of the patella. Palpable bakers cyst    Range of Motion: There is full range of motion. Strength: Normal quadriceps development. Effusion: No effusion or swelling present.      Ligamentous stability: No cruciate or collateral ligament instability. Neurologic and vascular: Skin is warm and well-perfused. Sensation is intact to light-touch. Special tests: Negative Francisco sign. Right knee examination:    Gait: No use of assistive devices. No antalgic gait. Alignment: normal alignment. Inspection/skin: Skin is intact without erythema or ecchymosis. No gross deformity. Palpation: mild crepitus. no joint line tenderness present. Range of Motion: There is full range of motion. Strength: Normal quadriceps development. Effusion: No effusion or swelling present. Ligamentous stability: No cruciate or collateral ligament instability. Neurologic and vascular: Skin is warm and well-perfused. Sensation is intact to light-touch. Special tests: Negative Francisco sign. Radiology:       Pertinent imaging was interpreted and reviewed with the patient today, images only - no report available. Radiographs were obtained and reviewed in the office; 4 views: bilateral PA, bilateral Emily Latisha, bilateral Merchants AND left lateral    Impression: medial compartment osteoarthritis     Assessment :  63 yo female s/p left medial meniscal root repair with medial compartment narrowing and patellar tendonitis    Impression:  Encounter Diagnoses   Name Primary?     Left knee pain, unspecified chronicity Yes    S/P left knee arthroscopy     Primary osteoarthritis of left knee     Patellar tendinitis of left knee        Office Procedures:  Orders Placed This Encounter   Procedures    XR KNEE LEFT (MIN 4 VIEWS)     [id number]     Standing Status:   Future     Number of Occurrences:   1     Standing Expiration Date:   1/11/2024     Order Specific Question:   Reason for exam:     Answer:   Pain    XR KNEE RIGHT (3 VIEWS)     Standing Status:   Future     Number of Occurrences:   1     Standing Expiration Date:   1/11/2024     Order Specific Question:   Reason for exam:     Answer:   pain Ambulatory referral to Physical Therapy     Referral Priority:   Routine     Referral Type:   Eval and Treat     Referral Reason:   Specialty Services Required     Number of Visits Requested:   1       Plan: Pertinent imaging was reviewed. The etiology, natural history, and treatment options for the disorder were discussed. The roles of activity medication, antiinflammatories, injections, bracing, physical therapy, and surgical interventions were all described to the patient and questions were answered. Patient underwent a root repair almost 6 months ago. On xray she has some medial joint line narrowing indicating OA suggesting that the root repair did not heel appropriately. On exam today her pain is primarily anterior over her patellar tendon. Because of this, patient is a candidate for PT and Meloxicam. She is not symptomatic over her medial joint line and thus the OA is not her primary concern today. She will follow up in one month or sooner if worsening symptoms. The patient was advised that NSAID-type medications have several potential side effects that include: gastrointestinal irritation including hemorrhage, renal injury, as well as an increased risk for heart attack and stroke. The patient was asked to take the medication with food and to stop if there is any symptoms of GI upset, including heartburn, nausea, increased gas or diarrhea. I asked the patient to contact their medical provider for vomiting, abdominal pain or black/bloody stools. The patient should have renal function testing per his medical provider periodically if the medication is taken on a regular basis. The patient should be alert for any swelling in the lower extremities and should stop taking the medication immediately and contact their medical provider should this occur.   In addition, the patient should stop taking the medication immediately and contact their medical provider should there be any shortness of breath, fatigue and be evaluated in an emergency facility for any chest pain. The patient expresses understanding of these issues and questions were answered. Vivek Sun is in agreement with this plan. All questions were answered to patient's satisfaction and was encouraged to call with any further questions. Total time spent for evaluation, education, and development of treatment plan: 39 minutes    Neha Britt, [address]  1/11/2023    During this exam, I, Neha De La Torre PA-C, functioned as a scribe for Dr. Ghulam Colin. The history taking and physical examination were performed by Dr. Ghulam Colin. All counseling during the appointment was performed between the patient and Dr. Ghulam Colin. 1/11/2023 11:06 AM    This dictation was performed with a verbal recognition program (DRAGON) and it was checked for errors. It is possible that there are still dictated errors within this office note. If so, please bring any areas to my attention for an addendum. All efforts were made to ensure that this office note is accurate. I attest that I met personally with the patient, performed the described exam, reviewed the radiographic studies and medical records associated with this patient and supervised the services that are described above.      Herberth Mccartney MD

## 2023-01-13 ENCOUNTER — TELEPHONE (OUTPATIENT)
Dept: ORTHOPEDIC SURGERY | Age: 58
End: 2023-01-13

## 2023-01-13 RX ORDER — MELOXICAM 15 MG/1
15 TABLET ORAL DAILY
Qty: 90 TABLET | Refills: 0 | Status: SHIPPED | OUTPATIENT
Start: 2023-01-13

## 2023-01-16 NOTE — TELEPHONE ENCOUNTER
From: Dinesh Cifuentes  To: Dr. Natalia Lee: 1/5/2023 12:43 PM EST  Subject: TRBTGN    Good afternoon,    I'm interested in starting [facility]FORT ESTEPHANIE or something similar to it. Can you recommend the best medication for me? I really need to get my weight under control.     Thanks, [employer]

## 2023-01-18 ENCOUNTER — OFFICE VISIT (OUTPATIENT)
Dept: PRIMARY CARE CLINIC | Age: 58
End: 2023-01-18
Payer: COMMERCIAL

## 2023-01-18 VITALS
HEART RATE: 102 BPM | WEIGHT: 205 LBS | SYSTOLIC BLOOD PRESSURE: 125 MMHG | BODY MASS INDEX: 32.95 KG/M2 | TEMPERATURE: 97.9 F | HEIGHT: 66 IN | OXYGEN SATURATION: 95 % | DIASTOLIC BLOOD PRESSURE: 89 MMHG

## 2023-01-18 DIAGNOSIS — E78.00 HYPERCHOLESTEREMIA: ICD-10-CM

## 2023-01-18 DIAGNOSIS — F33.41 RECURRENT MAJOR DEPRESSIVE DISORDER, IN PARTIAL REMISSION (HCC): ICD-10-CM

## 2023-01-18 DIAGNOSIS — E66.09 CLASS 1 OBESITY DUE TO EXCESS CALORIES WITHOUT SERIOUS COMORBIDITY WITH BODY MASS INDEX (BMI) OF 30.0 TO 30.9 IN ADULT: Primary | ICD-10-CM

## 2023-01-18 DIAGNOSIS — G25.81 RLS (RESTLESS LEGS SYNDROME): ICD-10-CM

## 2023-01-18 LAB
A/G RATIO: 1.6 (ref 1.1–2.2)
ALBUMIN SERPL-MCNC: 4.1 G/DL (ref 3.4–5)
ALP BLD-CCNC: 88 U/L (ref 40–129)
ALT SERPL-CCNC: 40 U/L (ref 10–40)
ANION GAP SERPL CALCULATED.3IONS-SCNC: 12 MMOL/L (ref 3–16)
AST SERPL-CCNC: 38 U/L (ref 15–37)
BASOPHILS ABSOLUTE: 0.1 K/UL (ref 0–0.2)
BASOPHILS RELATIVE PERCENT: 1.3 %
BILIRUB SERPL-MCNC: <0.2 MG/DL (ref 0–1)
BUN BLDV-MCNC: 25 MG/DL (ref 7–20)
CALCIUM SERPL-MCNC: 9.4 MG/DL (ref 8.3–10.6)
CHLORIDE BLD-SCNC: 104 MMOL/L (ref 99–110)
CHOLESTEROL, TOTAL: 226 MG/DL (ref 0–199)
CO2: 25 MMOL/L (ref 21–32)
CREAT SERPL-MCNC: 1 MG/DL (ref 0.6–1.1)
EOSINOPHILS ABSOLUTE: 0.4 K/UL (ref 0–0.6)
EOSINOPHILS RELATIVE PERCENT: 7.1 %
FERRITIN: 98.9 NG/ML (ref 15–150)
GFR SERPL CREATININE-BSD FRML MDRD: >60 ML/MIN/{1.73_M2}
GLUCOSE BLD-MCNC: 94 MG/DL (ref 70–99)
HCT VFR BLD CALC: 41 % (ref 36–48)
HDLC SERPL-MCNC: 53 MG/DL (ref 40–60)
HEMOGLOBIN: 13.7 G/DL (ref 12–16)
LDL CHOLESTEROL CALCULATED: 126 MG/DL
LYMPHOCYTES ABSOLUTE: 1.5 K/UL (ref 1–5.1)
LYMPHOCYTES RELATIVE PERCENT: 27.9 %
MCH RBC QN AUTO: 30.2 PG (ref 26–34)
MCHC RBC AUTO-ENTMCNC: 33.4 G/DL (ref 31–36)
MCV RBC AUTO: 90.6 FL (ref 80–100)
MONOCYTES ABSOLUTE: 0.4 K/UL (ref 0–1.3)
MONOCYTES RELATIVE PERCENT: 7.9 %
NEUTROPHILS ABSOLUTE: 3 K/UL (ref 1.7–7.7)
NEUTROPHILS RELATIVE PERCENT: 55.8 %
PDW BLD-RTO: 13.5 % (ref 12.4–15.4)
PLATELET # BLD: 260 K/UL (ref 135–450)
PMV BLD AUTO: 8.1 FL (ref 5–10.5)
POTASSIUM SERPL-SCNC: 4.7 MMOL/L (ref 3.5–5.1)
RBC # BLD: 4.53 M/UL (ref 4–5.2)
SODIUM BLD-SCNC: 141 MMOL/L (ref 136–145)
TOTAL PROTEIN: 6.6 G/DL (ref 6.4–8.2)
TRIGL SERPL-MCNC: 237 MG/DL (ref 0–150)
VLDLC SERPL CALC-MCNC: 47 MG/DL
WBC # BLD: 5.4 K/UL (ref 4–11)

## 2023-01-18 PROCEDURE — 99214 OFFICE O/P EST MOD 30 MIN: CPT | Performed by: FAMILY MEDICINE

## 2023-01-18 PROCEDURE — 96127 BRIEF EMOTIONAL/BEHAV ASSMT: CPT | Performed by: FAMILY MEDICINE

## 2023-01-18 RX ORDER — SEMAGLUTIDE 0.25 MG/.5ML
0.25 INJECTION, SOLUTION SUBCUTANEOUS
Qty: 2 ML | Refills: 1 | Status: SHIPPED | OUTPATIENT
Start: 2023-01-18

## 2023-01-18 RX ORDER — PRAMIPEXOLE DIHYDROCHLORIDE 1.5 MG/1
0.75 TABLET ORAL EVERY EVENING
Qty: 15 TABLET | Refills: 0
Start: 2023-01-18 | End: 2023-02-17

## 2023-01-18 ASSESSMENT — PATIENT HEALTH QUESTIONNAIRE - PHQ9
4. FEELING TIRED OR HAVING LITTLE ENERGY: 3
SUM OF ALL RESPONSES TO PHQ QUESTIONS 1-9: 6
1. LITTLE INTEREST OR PLEASURE IN DOING THINGS: 0
6. FEELING BAD ABOUT YOURSELF - OR THAT YOU ARE A FAILURE OR HAVE LET YOURSELF OR YOUR FAMILY DOWN: 1
SUM OF ALL RESPONSES TO PHQ QUESTIONS 1-9: 5
SUM OF ALL RESPONSES TO PHQ QUESTIONS 1-9: 6
SUM OF ALL RESPONSES TO PHQ9 QUESTIONS 1 & 2: 0
10. IF YOU CHECKED OFF ANY PROBLEMS, HOW DIFFICULT HAVE THESE PROBLEMS MADE IT FOR YOU TO DO YOUR WORK, TAKE CARE OF THINGS AT HOME, OR GET ALONG WITH OTHER PEOPLE: NOT DIFFICULT AT ALL
9. THOUGHTS THAT YOU WOULD BE BETTER OFF DEAD, OR OF HURTING YOURSELF: 1
5. POOR APPETITE OR OVEREATING: 0
8. MOVING OR SPEAKING SO SLOWLY THAT OTHER PEOPLE COULD HAVE NOTICED. OR THE OPPOSITE, BEING SO FIGETY OR RESTLESS THAT YOU HAVE BEEN MOVING AROUND A LOT MORE THAN USUAL: 0
7. TROUBLE CONCENTRATING ON THINGS, SUCH AS READING THE NEWSPAPER OR WATCHING TELEVISION: 0
2. FEELING DOWN, DEPRESSED OR HOPELESS: 0
3. TROUBLE FALLING OR STAYING ASLEEP: 1
SUM OF ALL RESPONSES TO PHQ QUESTIONS 1-9: 6

## 2023-01-18 ASSESSMENT — PATIENT HEALTH QUESTIONNAIRE - GENERAL
HAVE YOU EVER, IN YOUR WHOLE LIFE, TRIED TO KILL YOURSELF OR MADE A SUICIDE ATTEMPT?: NO
HAS THERE BEEN A TIME IN THE PAST MONTH WHEN YOU HAVE HAD SERIOUS THOUGHTS ABOUT ENDING YOUR LIFE?: NO
IN THE PAST YEAR HAVE YOU FELT DEPRESSED OR SAD MOST DAYS, EVEN IF YOU FELT OKAY SOMETIMES?: NO

## 2023-01-18 ASSESSMENT — COLUMBIA-SUICIDE SEVERITY RATING SCALE - C-SSRS
1. WITHIN THE PAST MONTH, HAVE YOU WISHED YOU WERE DEAD OR WISHED YOU COULD GO TO SLEEP AND NOT WAKE UP?: NO
6. HAVE YOU EVER DONE ANYTHING, STARTED TO DO ANYTHING, OR PREPARED TO DO ANYTHING TO END YOUR LIFE?: NO
2. HAVE YOU ACTUALLY HAD ANY THOUGHTS OF KILLING YOURSELF?: NO

## 2023-01-18 NOTE — PATIENT INSTRUCTIONS
SUMMARY AND RECOMMENDATIONS    ? Goals of therapy - Restless legs syndrome (RLS) is a treatable condition that generally responds well to pharmacologic therapy. The goals of therapy are to reduce or eliminate symptoms of RLS and improve daytime function, sleep, and quality of life. (See 'Introduction' above.)    ? Check iron stores - Iron stores should be evaluated in all patients with RLS (algorithm 1). For patients with iron deficiency or low-normal ferritin levels (ie, serum ferritin <75 ng/mL), we suggest a trial of oral iron therapy (table 5) (Grade 2C). Unless symptoms are severe, we replete low iron stores before initiating a first-line medication. (See 'Iron replacement' above.)    Intravenous iron is generally reserved for patients with a serum ferritin ? 100 ng/mL and either a malabsorption state, complete intolerance to oral iron preparations, or moderate to severe symptoms despite a trial of oral iron, or the need for a more rapid response due to severity of symptoms. (See 'Administration of iron' above.)    Any adult with iron deficiency (eg, serum ferritin level <30 ng/mL) should be evaluated for the underlying cause, which may include gastrointestinal blood loss, especially for adults over 40 to 50 years, or a condition that interferes with iron absorption (algorithm 3). (See \"Causes and diagnosis of iron deficiency and iron deficiency anemia in adults\", section on 'Search for source of blood and iron loss'.)    ? Behavioral strategies - Patients should be counseled to avoid aggravating drugs and substances such as caffeine (table 1). Mental alerting activities, exercise, leg massage, and applied heat often help RLS (table 6). In patients with mild and/or intermittent symptoms, these therapies may be sufficient for symptom relief. (See 'Nonpharmacologic therapy' above.)    ? On-demand therapy for intermittent symptoms - In patients with intermittent symptoms that are not frequent enough to require daily therapy but are nonetheless disabling when they do occur (eg, long car rides, occasional bedtime flares), we suggest a trial of carbidopa-levodopa on an as-needed basis (Grade 2C). One 25/100 mg tablet (or half a tablet) has rapid onset, good tolerability, and is unlikely to cause augmentation when used in this manner. (See 'Intermittent symptoms' above.)    ? First-line daily medication - In most patients with chronic persistent RLS (ie, symptoms ? 2 days per week and significantly impacting quality of life), we suggest initial therapy with a gabapentinoid (pregabalin, gabapentin, or gabapentin enacarbil) rather than a dopamine agonist (Grade 2B). While both classes of medications are more effective than placebo, we prefer to start with a gabapentinoid to avoid the risk of augmentation and impulse control disorders with dopamine agonists. Doses are provided in the table (table 2). (See 'Choice of therapy' above and 'Gabapentinoids' above.)    A dopamine agonist (pramipexole, ropinirole, or rotigotine) is a reasonable first-line alternative in patients at increased risk for side effects from a gabapentinoid, such as those with obesity and its complications, a past or present history of moderate or severe depression, disorders causing gait instability or respiratory failure, or a history of substance use disorder (algorithm 1). (See 'Dopamine agonists' above.)    ? Options for refractory RLS - When RLS symptoms are refractory to first-line therapy with a gabapentinoid or a dopamine agonist, treatment options include combination therapy with drugs from different classes and a trial of low-dose opioids. Iron stores, exacerbating drugs and substances (table 1), and behavioral strategies (table 6) should also be reviewed. (See 'Refractory RLS' above.)    ? Management of augmentation - Augmentation refers to an overall worsening of symptom severity while taking a dopaminergic medication for RLS.  It is the most common complication of long-term dopaminergic therapy (table 3 and table 4). Management generally involves reduction or elimination of the causative agent and substitution of other medications (algorithm 2).  (See 'Augmentation' above.)

## 2023-01-18 NOTE — PROGRESS NOTES
Chief Complaint   Patient presents with    Cholesterol Problem    Depression       HPI: Reginaldo Pulliam  presents for evaluation and management of restless leg, obesity and depression. [facility] notes that since Dr. Aronld Power increased her Mirapex she has been eating compulsively and has gained 15 pounds she notes that her symptoms are better however. She has not had her iron stores checked to date. She notes her mood is pretty good and asks about possibly coming off the Effexor. Been taking and tolerating her Crestor for her cholesterol.     Today's PHQ:    PHQ Scores 1/18/2023 4/4/2022 9/27/2021 1/21/2021 10/2/2020 4/9/2018   PHQ2 Score 0 0 1 0 0 0   PHQ9 Score 6 0 5 7 5 0     Interpretation of Total Score Depression Severity: 1-4 = Minimal depression, 5-9 = Mild depression, 10-14 = Moderate depression, 15-19 = Moderately severe depression, 20-27 = Severe depression        Review of Systems    No Known Allergies  New Prescriptions    No medications on file     Current Outpatient Medications   Medication Sig Dispense Refill    meloxicam (MOBIC) 15 MG tablet Take 1 tablet by mouth daily 30 tablet 3    rosuvastatin (CRESTOR) 10 MG tablet Take 1 tablet by mouth nightly 30 tablet 3    Ivermectin (SOOLANTRA) 1 % CREA Apply 1 g topically daily 30 g 5    Carboxymeth-Cellulose-CitricAc (PLENITY WELCOME KIT) CAPS Take 1 capsule by mouth 3 times daily (before meals) 90 capsule 2    carbidopa-levodopa (SINEMET)  MG per tablet TAKE 1 TABLET BY MOUTH ONE TIME A DAY 90 tablet 3    cyclobenzaprine (FLEXERIL) 10 MG tablet Take 1 tablet by mouth 2 times daily as needed for Muscle spasms 28 tablet 0    venlafaxine (EFFEXOR XR) 75 MG extended release capsule TAKE 1 CAPSULE BY MOUTH ONE TIME A DAY 90 capsule 3    Multiple Vitamins-Minerals (MULTIVITAL PO) Take by mouth      cetirizine (ZYRTEC) 10 MG tablet Take 10 mg by mouth daily      doxycycline (VIBRAMYCIN) 50 MG capsule Take 1 capsule by mouth daily 90 capsule 1    pramipexole (MIRAPEX) 1.5 MG tablet Take 1 tablet by mouth every evening 30 tablet 0    simvastatin (ZOCOR) 40 MG tablet TAKE ONE TABLET BY MOUTH NIGHTLY (Patient not taking: Reported on 11/3/2022) 90 tablet 3     No current facility-administered medications for this visit. Past Medical History:   Diagnosis Date    ADHD (attention deficit hyperactivity disorder) 2010    ADD    Allergic rhinitis 2000    Itching    Anxiety     Breast cancer ([facility] [address] 2020    right    Breast Cancer - right breast ([facility] [address]     Dr. Eve Narayan: pN0 January 2021: ER/FL+, HER2 Neg :  Aromatase Inhibitor tx (will need DEXA)    History of therapeutic radiation     Hypercholesteremia     Obstructive sleep apnea (adult) (pediatric)     APAP 10-18 (ave 12 cwp)    Osteoarthritis 2022    Knees    PONV (postoperative nausea and vomiting)     Restless leg     Sleep apnea     uses cpap         Objective   /89   Pulse (!) 102   Temp 97.9 °F (36.6 °C)   Ht 5' 6\" (1.676 m)   Wt 205 lb (93 kg)   LMP  (LMP Unknown)   SpO2 95%   BMI 33.09 kg/m²   Wt Readings from Last 3 Encounters:   01/18/23 205 lb (93 kg)   01/11/23 190 lb (86.2 kg)   12/01/22 197 lb (89.4 kg)       Physical Exam  Constitutional:       Appearance: She is well-developed. Cardiovascular:      Rate and Rhythm: Normal rate and regular rhythm. Pulses:           Dorsalis pedis pulses are 2+ on the right side and 2+ on the left side. Posterior tibial pulses are 2+ on the right side and 2+ on the left side. Heart sounds: No murmur heard. No friction rub. No gallop. Comments: No Lower Extremity Edema  Pulmonary:      Effort: Pulmonary effort is normal.      Breath sounds: Normal breath sounds. No wheezing or rales. Abdominal:      General: Bowel sounds are normal. There is no distension. Palpations: Abdomen is soft. There is no mass. Tenderness: There is no abdominal tenderness. Skin:     General: Skin is warm and dry. Findings: No rash.          Chemistry Component Value Date/Time     10/20/2022 0934    K 4.6 10/20/2022 0934     10/20/2022 0934    CO2 29 10/20/2022 0934    BUN 15 10/20/2022 0934    CREATININE 1.0 10/20/2022 0934        Component Value Date/Time    CALCIUM 10.0 10/20/2022 0934    ALKPHOS 109 10/20/2022 0934    AST 29 10/20/2022 0934    ALT 23 10/20/2022 0934    BILITOT <0.2 10/20/2022 0934          Lab Results   Component Value Date    WBC 5.9 01/21/2021    HGB 14.4 01/21/2021    HCT 42.6 01/21/2021    MCV 92.3 01/21/2021     01/21/2021     No results found for: LABA1C  No results found for: EAG  No results found for: LABA1C  No components found for: CHLPL  Lab Results   Component Value Date    TRIG 202 (H) 10/20/2022    TRIG 196 (H) 10/20/2021    TRIG 164 (H) 10/02/2020     Lab Results   Component Value Date    HDL 45 10/20/2022    HDL 52 10/20/2021    HDL 46 10/02/2020     Lab Results   Component Value Date    LDLCALC 148 (H) 10/20/2022    LDLCALC 129 (H) 10/20/2021    LDLCALC 150 (H) 10/02/2020     Lab Results   Component Value Date    LABVLDL 40 10/20/2022    LABVLDL 39 10/20/2021    LABVLDL 33 10/02/2020         Assessment   Plan   1. Class 1 obesity due to excess calories without serious comorbidity with body mass index (BMI) of 30.0 to 30.9 in adult  Trial  [facility]ROSALIE HUMMEL after a week of decreasing her Mirapex to 0.75 mg if her appetite does not improve. - Semaglutide-Weight Management (WEGOVY) 0.25 MG/0.5ML SOAJ SC injection; Inject 0.25 mg into the skin every 7 days  Dispense: 2 mL; Refill: 1    2. Hypercholesteremia  We will check labs on Crestor to see how she is doing  - Lipid Panel; Future  - Comprehensive Metabolic Panel; Future    3. Recurrent major depressive disorder, in partial remission (HCC)  Controlled: Appears stable. We will continue current management and monitor for adverse reaction and disease progression.   Follow-up as noted below    - [place] BEHAV ASSMT W/SCORE & DOCD/STAND INSTRUMENT    4. RLS (restless legs syndrome)  We will check her iron stores. If her iron stores are low we will attempt to supplement with iron therapy and follow her symptoms as we take her off Mirapex. We will decrease her Mirapex to 0.75 mg today and monitor her response. Follow-up in 1 month. Goal will be to take her off Mirapex and carbidopa levodopa and possibly try gabapentin if she needs that medication  - Ferritin; Future  - CBC with Auto Differential; Future  - pramipexole (MIRAPEX) 1.5 MG tablet; Take 0.5 tablets by mouth every evening  Dispense: 15 tablet; Refill: 0        RTC Return in about 1 month (around 2/18/2023).

## 2023-01-19 NOTE — RESULT ENCOUNTER NOTE
Good Morning Elise,    Thank you for getting your blood drawn. Your results are back and your iron stores are excellent. This is not your issue and I do not think iron supplementation will help you with your leg cramps. Lets see how you do decreasing your pramipexole and if that helps you with your cravings. If you notice that your restless leg symptoms are worsening as you do this, I can send you in a prescription for gabapentin before I see you back.       Have a great weekend,    Dr. Rola Smith      For your convenience I have listed your future appointment(s) below:  Future Appointments  2/8/2023   8:40 AM    TASH Carr SLEEP MED        [facility]  2/22/2023  9:00 AM    MD TANIA Clark          [place] - DYD  12/5/2023  11:20 AM   Eve Lira MD       [facility]

## 2023-01-24 DIAGNOSIS — L71.0 PERIORIFICIAL DERMATITIS: ICD-10-CM

## 2023-01-24 DIAGNOSIS — L71.9 ROSACEA: ICD-10-CM

## 2023-01-24 RX ORDER — IVERMECTIN 10 MG/G
1 CREAM TOPICAL DAILY
Qty: 30 G | Refills: 5 | Status: SHIPPED | OUTPATIENT
Start: 2023-01-24

## 2023-02-01 ENCOUNTER — TELEPHONE (OUTPATIENT)
Dept: ORTHOPEDIC SURGERY | Age: 58
End: 2023-02-01

## 2023-02-01 NOTE — PROGRESS NOTES
Diagnosis: [x] TANYA (G47.33) [] CSA (G47.31) [] Apnea (G47.30)   Length of Need: [x] 15 Months [] 99 Months [] Other:   Machine (GISEL!): [] Respironics Dream Station      Auto [] ResMed AirSense     Auto [] Other:     []  CPAP () [] Bilevel ()   Mode: [] Auto [] Spontaneous    Mode: [] Auto [] Spontaneous            Comfort Settings:      Humidifier: [] Heated ()        [x] Water chamber replacement ()/ 1 per 6 months        Mask:   [x] Nasal () /1 per 3 months [] Full Face () /1 per 3 months   [x] Patient choice -Size and fit mask [] Patient Choice - Size and fit mask   [] Dispense: [] Dispense:   [x] Headgear () / 1 per 3 months [] Headgear () / 1 per 3 months   [x] Replacement Nasal Cushion ()/2 per month [] Interface Replacement ()/1 per month   [] Replacement Nasal Pillows ()/2 per month         Tubing: [x] Heated ()/1 per 3 months    [] Standard ()/1 per 3 months [] Other:           Filters: [x] Non-disposable ()/1 per 6 months     [x] Ultra-Fine, Disposable ()/2 per month        Miscellaneous: [] Chin Strap ()/ 1 per 6 months [] O2 bleed-in:        LPM   [] Oxymetry on CPAP/Bilevel []  Other:         Start Order Date: 02/01/23    MEDICAL JUSTIFICATION:  I, the undersigned, certify that the above prescribed supplies are medically necessary for this patients wellbeing. In my opinion, the supplies are both reasonable and necessary in reference to accepted standards of medicalpractice in treatment of this patients condition. Ravinder Corbin NP    NPI: [id number]       Order Signed Date: 02/01/23  [facility]  Pulmonary, Sleep, and Critical Care    Pulmonary, Sleep, and Critical Care  [facility] [address] [place] [address].  [facility], [address] [place] [address] , [address] [facility]  Phone: [phone]    Fax:  [facility] Hailey Ramirez  1965  [address] [facility] [address]  [phone] (home)   [phone] (mobile)      Insurance Info (confirm with patient if correct):  Payer/Plan Subscr  Sex Relation Sub.  Ins. ID Effective Group Num

## 2023-02-06 NOTE — TELEPHONE ENCOUNTER
Received DENIAL for Wegovy 0.25MG/0.5ML auto-injectors; agents for weight loss are not covered by the health plan. Denial letter attached. If this requires a response please respond to the pool ( P [facility]). Thank you please advise patient.

## 2023-02-07 DIAGNOSIS — L43.8 ORAL LICHEN PLANUS: Primary | ICD-10-CM

## 2023-02-22 ENCOUNTER — OFFICE VISIT (OUTPATIENT)
Dept: ORTHOPEDIC SURGERY | Age: 58
End: 2023-02-22

## 2023-02-22 DIAGNOSIS — M17.12 PRIMARY OSTEOARTHRITIS OF LEFT KNEE: Primary | ICD-10-CM

## 2023-02-22 DIAGNOSIS — Z98.890 S/P LEFT KNEE ARTHROSCOPY: ICD-10-CM

## 2023-02-22 RX ORDER — METHYLPREDNISOLONE ACETATE 40 MG/ML
40 INJECTION, SUSPENSION INTRA-ARTICULAR; INTRALESIONAL; INTRAMUSCULAR; SOFT TISSUE ONCE
Status: COMPLETED | OUTPATIENT
Start: 2023-02-22 | End: 2023-02-22

## 2023-02-22 RX ADMIN — METHYLPREDNISOLONE ACETATE 40 MG: 40 INJECTION, SUSPENSION INTRA-ARTICULAR; INTRALESIONAL; INTRAMUSCULAR; SOFT TISSUE at 17:20

## 2023-02-22 NOTE — PROGRESS NOTES
Chief Complaint  Follow-up (Left knee )      History of Present Illness:  Karen Bernstein is a pleasant 62 y.o. female who presents today for follow up evaluation of left knee pain. She is 6 month status post medial meniscus root repair on 7/28/2022. She was doing well but recently had an exacerbation of her arthritic symptoms. She has been attending formal, supervised physical therapy as well as using meloxicam and lateral wedges with minimal relief. She complains of anterior and posterior pain as well as swelling, worse going up and down stairs and rising from sitting. Denies any new injuries. Medical History:  Patient's medications, allergies, past medical, surgical, social and family histories were reviewed and updated as appropriate. Pertinent items are noted in HPI  Review of systems reviewed from Patient History Form completed today and available in the patient's chart under the Media tab.               Past Medical History:   Diagnosis Date    ADHD (attention deficit hyperactivity disorder) 2010    ADD    Allergic rhinitis 2000    Itching    Anxiety     Breast cancer ([facility] [address] 2020    right    Breast Cancer - right breast ([facility] [address]     Dr. Leah Nassar: pN0 January 2021: ER/FL+, HER2 Neg :  Aromatase Inhibitor tx (will need DEXA)    History of therapeutic radiation     Hypercholesteremia     Obstructive sleep apnea (adult) (pediatric)     APAP 10-18 (ave 12 cwp)    Osteoarthritis 2022    Knees    PONV (postoperative nausea and vomiting)     Restless leg     Sleep apnea     uses cpap        Past Surgical History:   Procedure Laterality Date    ACHILLES TENDON SURGERY Right     BREAST BIOPSY Right 01/26/2021    RIGHT SENTINEL LYMPH NODE BIOPSY performed by Misti Gonzalez DO at [address] [facility]  10/29/2020    BREAST REDUCTION SURGERY Bilateral 10/29/2020    BILATERAL BREAST REDUCTION AND SPY performed by Jeniffer Mancia MD at [address] N/A 06/11/2020 COLORECTAL CANCER SCREENING, HIGH RISK performed by Surjit Murillo MD at [address] ([address] [facility])      supracervical hysterectomy Brian Bedoya      Root Repair    KNEE ARTHROSCOPY Left 2022    LEFT KNEE ARTHROSCOPY, MEDIAL MENISCUS, ROOT REPAIR, SYNOVECTOMY, CHONDROPLASTY performed by Mirian Batista MD at [address]       Family History   Problem Relation Age of Onset    Other Mother     High Cholesterol Mother     Obesity Mother     Heart Attack Father 46         at 66    Hypertension Father     Other Brother         bipolar    Mental Illness Brother         Bipolar    Colon Cancer Brother        Social History     Socioeconomic History    Marital status: Single     Spouse name: Andrez Orellana ([phone])     Number of children: 0   Occupational History    Occupation: [employer]   Tobacco Use    Smoking status: Former     Packs/day: 0.50     Years: 25.00     Pack years: 12.50     Types: Cigarettes     Start date: 1985     Quit date: 2018     Years since quittin.6    Smokeless tobacco: Never    Tobacco comments:     10 year hiatus from smoking during those years   Vaping Use    Vaping Use: Never used   Substance and Sexual Activity    Alcohol use: Not Currently     Comment: occasionally    Drug use: No    Sexual activity: Yes     Partners: Female     Social Determinants of Health     Financial Resource Strain: Low Risk     Difficulty of Paying Living Expenses: Not hard at all   Food Insecurity: No Food Insecurity    Worried About Running Out of Food in the Last Year: Never true    Ran Out of Food in the Last Year: Never true       Current Outpatient Medications   Medication Sig Dispense Refill    fluocinonide (LIDEX) 0.05 % cream Apply to affected areas in mouth BID PRN for  flares for up to 2 weeks per month.  60 g 3    Ivermectin (SOOLANTRA) 1 % CREA Apply 1 g topically daily 30 g 5 Semaglutide-Weight Management (WEGOVY) 0.25 MG/0.5ML SOAJ SC injection Inject 0.25 mg into the skin every 7 days 2 mL 1    pramipexole (MIRAPEX) 1.5 MG tablet Take 0.5 tablets by mouth every evening 15 tablet 0    meloxicam (MOBIC) 15 MG tablet Take 1 tablet by mouth daily 30 tablet 3    rosuvastatin (CRESTOR) 10 MG tablet Take 1 tablet by mouth nightly 30 tablet 3    Carboxymeth-Cellulose-CitricAc (PLENITY WELCOME KIT) CAPS Take 1 capsule by mouth 3 times daily (before meals) 90 capsule 2    carbidopa-levodopa (SINEMET)  MG per tablet TAKE 1 TABLET BY MOUTH ONE TIME A DAY 90 tablet 3    cyclobenzaprine (FLEXERIL) 10 MG tablet Take 1 tablet by mouth 2 times daily as needed for Muscle spasms 28 tablet 0    venlafaxine (EFFEXOR XR) 75 MG extended release capsule TAKE 1 CAPSULE BY MOUTH ONE TIME A DAY 90 capsule 3    Multiple Vitamins-Minerals (MULTIVITAL PO) Take by mouth      cetirizine (ZYRTEC) 10 MG tablet Take 10 mg by mouth daily       No current facility-administered medications for this visit. No Known Allergies    Vital signs:  LMP  (LMP Unknown)            LEFT Knee Examination:    Gait: No use of assistive devices. No antalgic gait. Alignment: Alignment appreciated. Inspection/skin: Quadriceps well developed. Skin is intact without erythema or ecchymosis. No gross deformity. Well healed surgical incisions. Palpation: Crepitus present. Tenderness over medial joint line. No pain with compression of patella. Nontender to light touch. Range of Motion: Full ROM. Strength: 5/5 quad strength    Effusion: Palpable Baker's cyst.    Ligamentous stability: Stable to valgus and varus stress at 0° and 30°. Solid endpoint with Lachman's. Negative posterior and anterior drawer signs. Patella tracking: Smooth translation of patella. Special tests: Negative Francisco sign. Patella apprehension sign negative. Neurologic and vascular: Skin is warm and well-perfused.  Distally neurovascularly intact. Additional findings: Calf soft nontender. Sensation is intact to light-touch. No pretibial edema. Right knee examination:     Gait: No use of assistive devices. No antalgic gait. Alignment: normal alignment. Inspection/skin: Skin is intact without erythema or ecchymosis. No gross deformity. Palpation: mild crepitus. no joint line tenderness present. Range of Motion: There is full range of motion. Strength: Normal quadriceps development. Effusion: No effusion or swelling present. Ligamentous stability: No cruciate or collateral ligament instability. Neurologic and vascular: Skin is warm and well-perfused. Sensation is intact to light-touch. Special tests: Negative Francisco sign. Radiology:     Pertinent imaging was interpreted and reviewed with the patient. Previous imaging dated 1/11/2023 showed a progression of medial compartment osteoarthritis compared to April 2022. Assessment :  61 yo female s/p left medial meniscal root repair with medial compartment narrowing and patellar tendonitis    Impression:  Encounter Diagnoses   Name Primary? Primary osteoarthritis of left knee Yes    S/P left knee arthroscopy        Office Procedures:  No orders of the defined types were placed in this encounter. Plan: The nature and natural history of osteoarthritis was discussed in detail the patient today. Treatment options both surgical and nonsurgical were discussed in detail. Patient was counseled with regard to the importance of activity modification as well as weight control. The role for medications, intra-articular injections as well as surgery were discussed. Patient's questions were answered. Patient's previous x-rays showed progression of her medial compartment osteoarthritis in which symptoms became recently exacerbated.  She got minimal relief from meloxicam and physical therapy therefore I believe she is a candidate for an intraarticular cortisone injection for her pain and inflammation. She wishes to proceed. Patient has had an exacerbation of left knee osteoarthritis. The indications and risks of steroid injection as well as treatment alternatives were discussed with the patient who consented to the procedure. Under sterile conditions and after informed consent was obtained the patient was given an injection into the LEFT knee. 2cc 40 mg of Depo-Medrol and 4 mL of 1% lidocaine were placed in the knee after it was prepped with chlorhexidine. This resulted in good relief of symptoms. There were no complications. The patient was advised to ice the knee this evening and to avoid vigorous activities for the next 2 days. They were advised to call us if there was any erythema, enduration, swelling or increasing pain. I will see her back in 1 month, sooner if symptoms worsen. Chalino Watts is in agreement with this plan. All questions were answered to patient's satisfaction and was encouraged to call with any further questions. Total time spent for evaluation, education and development of treatment plan: 35 minutes        Chaitanya SUAREZ ATC, am scribing for and in the presence of Dr. Pratibha Galarza. 02/22/23 3:17 PM Chaitanya Carmen ATC    I attest that I met personally with the patient, performed the described exam, reviewed the radiographic studies and medical records associated with this patient and supervised the services that are described above.      Matheus Rothman MD

## 2023-02-22 NOTE — PROGRESS NOTES
2/22/23 5:21 PM     Lidocaine Injection      NDC: 9125-3981-35    Lot Number: [id number]    Body Part: LEFT KNEE

## 2023-03-09 ENCOUNTER — PATIENT MESSAGE (OUTPATIENT)
Dept: ORTHOPEDIC SURGERY | Age: 58
End: 2023-03-09

## 2023-03-09 DIAGNOSIS — M17.12 PRIMARY OSTEOARTHRITIS OF LEFT KNEE: Primary | ICD-10-CM

## 2023-03-10 NOTE — TELEPHONE ENCOUNTER
From: Winston Kearney  To: Dr. Tiffany [facility]: 3/9/2023 5:21 PM EST  Subject: Left Knee Pain    Hello,    I got a steroid injection on 2/22 and it helped for about 1 week and now my knee pain is back. Dr. Velma Perez talked about injecting a gel-like fluid if the steroid didn't work. I'm inquiring about when I can get that procedure. This pain is increasing from day-to-day.     Thank you,  Lee Fall

## 2023-03-17 DIAGNOSIS — M17.12 PRIMARY OSTEOARTHRITIS OF LEFT KNEE: Primary | ICD-10-CM

## 2023-03-29 ENCOUNTER — TELEPHONE (OUTPATIENT)
Dept: PSYCHOLOGY | Age: 58
End: 2023-03-29

## 2023-03-29 ENCOUNTER — TELEPHONE (OUTPATIENT)
Dept: ORTHOPEDIC SURGERY | Age: 58
End: 2023-03-29

## 2023-03-29 NOTE — TELEPHONE ENCOUNTER
SUDHIR FROM [facility] CALLED REGARDING INJECTIONS, PACKAGE ARRIVED AT THE [facility] @ [facility] [phone]. SHE STATES THE PATIENT CAN COME PICK THEM UP AT THIS OFFICE AND BRING THEM FOR HER APPOINTMENT. PLEASE CALL PATIENT TO LET HER KNOW.

## 2023-03-29 NOTE — TELEPHONE ENCOUNTER
Patients medication was accidentally sent to [facility] care by mistake.  Called Dr. Maurice Navarro office, spoke with Ashley will have the patient come down here to get her medication

## 2023-03-31 ENCOUNTER — OFFICE VISIT (OUTPATIENT)
Dept: ORTHOPEDIC SURGERY | Age: 58
End: 2023-03-31

## 2023-03-31 VITALS — HEIGHT: 66 IN | BODY MASS INDEX: 32.95 KG/M2 | WEIGHT: 205 LBS

## 2023-03-31 DIAGNOSIS — M17.12 PRIMARY OSTEOARTHRITIS OF LEFT KNEE: Primary | ICD-10-CM

## 2023-03-31 DIAGNOSIS — Z98.890 S/P LEFT KNEE ARTHROSCOPY: ICD-10-CM

## 2023-03-31 NOTE — PROGRESS NOTES
3/31/23 12:29 PM     Lidocaine Injection      NDC: 1566-3631-43    Lot Number: [id number]    Body Part: left knee

## 2023-03-31 NOTE — PROGRESS NOTES
Chief Complaint  Follow-up (Left knee. S/p 8 months sx. Synvisc one )      History of Present Illness:  Ethel Mckay is a pleasant 62 y.o. female who presents today for follow up evaluation of left knee pain. She is 8 months status post medial meniscus root repair on 7/28/2022. She was doing well but recently had an exacerbation of her arthritic symptoms. She has been attending formal, supervised physical therapy as well as using meloxicam and lateral wedges with minimal relief. She underwent an intraarticular cortisone injection on 2/22/2023 with minimal relief. She complains of continued anterior and posterior pain as well as swelling, worse going up and down stairs and rising from sitting. She also states she was unable to play golf without siginificant pain. Denies any new injuries. Medical History:  Patient's medications, allergies, past medical, surgical, social and family histories were reviewed and updated as appropriate. Pertinent items are noted in HPI  Review of systems reviewed from Patient History Form completed today and available in the patient's chart under the Media tab.          Pain Assessment  Location of Pain: Knee  Location Modifiers: Left  Severity of Pain: 3  Quality of Pain: Sharp  Duration of Pain: Persistent  Frequency of Pain: Intermittent  Aggravating Factors: Stairs (weightbearing)  Limiting Behavior: Yes  Relieving Factors: Rest  Result of Injury: No  Work-Related Injury: No  Are there other pain locations you wish to document?: No    Past Medical History:   Diagnosis Date    ADHD (attention deficit hyperactivity disorder) 2010    ADD    Allergic rhinitis 2000    Itching    Anxiety     Breast cancer ([facility] [address] 2020    right    Breast Cancer - right breast ([facility] [address]     Dr. Tamanna Love: pN0 January 2021: ER/IA+, HER2 Neg :  Aromatase Inhibitor tx (will need DEXA)    History of therapeutic radiation     Hypercholesteremia     Obstructive sleep apnea (adult) (pediatric)     APAP 10-18 (ave 12

## 2023-05-04 ENCOUNTER — PATIENT MESSAGE (OUTPATIENT)
Dept: PRIMARY CARE CLINIC | Age: 58
End: 2023-05-04

## 2023-05-04 ENCOUNTER — TELEPHONE (OUTPATIENT)
Dept: PRIMARY CARE CLINIC | Age: 58
End: 2023-05-04

## 2023-05-04 NOTE — TELEPHONE ENCOUNTER
Wegovy was denied. I called patients insurance, this patient does not have pharmacy benefits through their ins, only medical per Teena. I then called [facility] and they told me that she gets her medications mailed to her by them. The medication in question is a non covered drug under patients plan. We are unable to do an appeal on a drug that is not even under the patients covered drugs. Is there another option? Patient is not Diabetic, they stopped covering this medication for non diabetics at the start of 2023.     Please advise    Thank you   Cassi Zapata

## 2023-05-09 DIAGNOSIS — F33.41 RECURRENT MAJOR DEPRESSIVE DISORDER, IN PARTIAL REMISSION (HCC): ICD-10-CM

## 2023-05-09 RX ORDER — VENLAFAXINE HYDROCHLORIDE 75 MG/1
CAPSULE, EXTENDED RELEASE ORAL
Qty: 90 CAPSULE | Refills: 3 | Status: SHIPPED | OUTPATIENT
Start: 2023-05-09

## 2023-05-09 NOTE — TELEPHONE ENCOUNTER
Medication:   Requested Prescriptions     Pending Prescriptions Disp Refills    venlafaxine (EFFEXOR XR) 75 MG extended release capsule [Pharmacy Med Name: VENLAFAXINE HCL ER 75MG CP24] 90 capsule 3     Sig: TAKE 1 CAPSULE BY MOUTH ONE TIME A DAY        Last Filled:  Last refill: 3/8/2023    Patient Phone Number: [phone] (home)     Last appt: 1/18/2023   Next appt: 5/17/2023    Last OARRS: No flowsheet data found.

## 2023-05-11 RX ORDER — PREDNISONE 10 MG/1
TABLET ORAL
Qty: 30 TABLET | Refills: 0 | Status: SHIPPED | OUTPATIENT
Start: 2023-05-11 | End: 2023-05-21

## 2023-05-11 NOTE — TELEPHONE ENCOUNTER
From: Dr. Francis Haynes  To: Dunia Sargent  Sent: 5/4/2023 4:18 PM EDT  Subject: Weight loss medication    Good afternoon Elise,    I am sorry but if your insurance does not cover it we really do not have any options for the injectable medications you are requesting for weight loss. The good news is it will not surprise me if these medications eventually are covered. I do not know if you are aware of it but I am moving to [place] later this summer. I wanted to let you know that it has been an absolute delight to have you as a patient!     Have a great weekend,  Andrea Richardson MD

## 2023-05-17 ENCOUNTER — OFFICE VISIT (OUTPATIENT)
Dept: PRIMARY CARE CLINIC | Age: 58
End: 2023-05-17
Payer: COMMERCIAL

## 2023-05-17 VITALS
HEIGHT: 67 IN | TEMPERATURE: 97.7 F | WEIGHT: 201 LBS | BODY MASS INDEX: 31.55 KG/M2 | SYSTOLIC BLOOD PRESSURE: 108 MMHG | OXYGEN SATURATION: 97 % | DIASTOLIC BLOOD PRESSURE: 73 MMHG | HEART RATE: 84 BPM

## 2023-05-17 DIAGNOSIS — F33.1 MAJOR DEPRESSIVE DISORDER, RECURRENT, MODERATE (HCC): ICD-10-CM

## 2023-05-17 DIAGNOSIS — L23.7 POISON IVY: Primary | ICD-10-CM

## 2023-05-17 DIAGNOSIS — E78.00 HYPERCHOLESTEREMIA: ICD-10-CM

## 2023-05-17 DIAGNOSIS — G25.81 RLS (RESTLESS LEGS SYNDROME): ICD-10-CM

## 2023-05-17 DIAGNOSIS — G47.33 OBSTRUCTIVE SLEEP APNEA: ICD-10-CM

## 2023-05-17 DIAGNOSIS — M13.0 POLYARTHRITIS: ICD-10-CM

## 2023-05-17 DIAGNOSIS — Z29.8 ALTITUDE SICKNESS PREVENTATIVE MEASURES: ICD-10-CM

## 2023-05-17 PROCEDURE — 99214 OFFICE O/P EST MOD 30 MIN: CPT | Performed by: FAMILY MEDICINE

## 2023-05-17 RX ORDER — PREDNISONE 10 MG/1
TABLET ORAL
Qty: 30 TABLET | Refills: 0 | Status: SHIPPED | OUTPATIENT
Start: 2023-05-17 | End: 2023-05-27

## 2023-05-17 RX ORDER — MELOXICAM 15 MG/1
15 TABLET ORAL DAILY
Qty: 30 TABLET | Refills: 3 | Status: SHIPPED | OUTPATIENT
Start: 2023-05-17

## 2023-05-17 RX ORDER — ACETAZOLAMIDE 125 MG/1
250 TABLET ORAL 2 TIMES DAILY
Qty: 20 TABLET | Refills: 0 | Status: SHIPPED | OUTPATIENT
Start: 2023-05-17

## 2023-05-17 ASSESSMENT — PATIENT HEALTH QUESTIONNAIRE - PHQ9
SUM OF ALL RESPONSES TO PHQ9 QUESTIONS 1 & 2: 0
7. TROUBLE CONCENTRATING ON THINGS, SUCH AS READING THE NEWSPAPER OR WATCHING TELEVISION: 0
6. FEELING BAD ABOUT YOURSELF - OR THAT YOU ARE A FAILURE OR HAVE LET YOURSELF OR YOUR FAMILY DOWN: 0
SUM OF ALL RESPONSES TO PHQ QUESTIONS 1-9: 0
3. TROUBLE FALLING OR STAYING ASLEEP: 0
1. LITTLE INTEREST OR PLEASURE IN DOING THINGS: 0
2. FEELING DOWN, DEPRESSED OR HOPELESS: 0
8. MOVING OR SPEAKING SO SLOWLY THAT OTHER PEOPLE COULD HAVE NOTICED. OR THE OPPOSITE, BEING SO FIGETY OR RESTLESS THAT YOU HAVE BEEN MOVING AROUND A LOT MORE THAN USUAL: 0
2. FEELING DOWN, DEPRESSED OR HOPELESS: 0
SUM OF ALL RESPONSES TO PHQ QUESTIONS 1-9: 0
1. LITTLE INTEREST OR PLEASURE IN DOING THINGS: 0
SUM OF ALL RESPONSES TO PHQ QUESTIONS 1-9: 0
SUM OF ALL RESPONSES TO PHQ9 QUESTIONS 1 & 2: 0
5. POOR APPETITE OR OVEREATING: 0
SUM OF ALL RESPONSES TO PHQ QUESTIONS 1-9: 0
SUM OF ALL RESPONSES TO PHQ QUESTIONS 1-9: 0
9. THOUGHTS THAT YOU WOULD BE BETTER OFF DEAD, OR OF HURTING YOURSELF: 0
SUM OF ALL RESPONSES TO PHQ QUESTIONS 1-9: 0
4. FEELING TIRED OR HAVING LITTLE ENERGY: 0

## 2023-05-17 NOTE — PATIENT INSTRUCTIONS
Shakira Velarde MD.         [facility]  [facility]  [address], [address] [place], [place], [address] [facility]  : Kamrny Duval    [facility]  [address], [address] [facility]  [phone]  Scott Garcia    [facility]  [facility]  [place], [address]  [phone]  PM:  Godfrey Buckley    [facility] 19  [facility] 99  [phone]  PM:  [address] [place] [address]  [phone]  PM:  Lauren Bragg    [facility]  [address]  [phone]  PM:  Christopher Ivey 2 [facility]  [address], [address]  :  Destiney Schroeder    [facility]  [address], 30[address] [facility]  [phone]  OfficeLead:[facility]  [address] [facility], [address] [place] [address]  [phone]  OfficeLead:Arturo Humphrey    [facility] 24  [facility]  [phone]  :  [facility]  [address], [address], [address]  :  Sawyer Cota  90 [facility], [address] [place] [address],[address] 2100  [facility]  [phone]  :  [address] 9098 [facility]

## 2023-05-17 NOTE — PROGRESS NOTES
Value Date    TRIG 237 (H) 01/18/2023    TRIG 202 (H) 10/20/2022    TRIG 196 (H) 10/20/2021     Lab Results   Component Value Date    HDL 53 01/18/2023    HDL 45 10/20/2022    HDL 52 10/20/2021     Lab Results   Component Value Date    LDLCALC 126 (H) 01/18/2023    LDLCALC 148 (H) 10/20/2022    LDLCALC 129 (H) 10/20/2021     Lab Results   Component Value Date    LABVLDL 47 01/18/2023    LABVLDL 40 10/20/2022    LABVLDL 39 10/20/2021         Assessment   Plan   1. Poison ivy  Refilled prescription in the event she gets another case. - predniSONE (DELTASONE) 10 MG tablet; Take 4 po qd x 3 days, then 3 po qd x 3 days, then 2 po qd x 3 days then 1 po qd x 3 days  Dispense: 30 tablet; Refill: 0    2. Polyarthritis  Chronic and persistent. We refilled her Mobic. Follow-up on an as-needed basis- meloxicam (MOBIC) 15 MG tablet; Take 1 tablet by mouth daily  Dispense: 30 tablet; Refill: 3    3. Altitude sickness preventative measures  Discussed options for altitude sickness and recommended adequate hydration and Diamox. Patient understands how to use the medication and is started a day before she ascends  - acetaZOLAMIDE (DIAMOX) 125 MG tablet; Take 2 tablets by mouth 2 times daily  Dispense: 20 tablet; Refill: 0    4. Major depressive disorder, recurrent, moderate  Controlled: Appears stable. We will continue current management and monitor for adverse reaction and disease progression. Follow-up as noted below      5. RLS (restless legs syndrome)  Controlled: Appears stable. We will continue current management and monitor for adverse reaction and disease progression. Follow-up as noted below      6. Obstructive sleep apnea  Controlled: Appears stable. We will continue current management and monitor for adverse reaction and disease progression. Follow-up as noted below      7. Hypercholesteremia  Controlled: Appears stable.   We will continue current management and monitor for adverse reaction and disease

## 2023-05-24 ENCOUNTER — OFFICE VISIT (OUTPATIENT)
Dept: ORTHOPEDIC SURGERY | Age: 58
End: 2023-05-24

## 2023-05-24 VITALS — WEIGHT: 201 LBS | BODY MASS INDEX: 31.55 KG/M2 | HEIGHT: 67 IN

## 2023-05-24 DIAGNOSIS — Z98.890 S/P LEFT KNEE ARTHROSCOPY: ICD-10-CM

## 2023-05-24 DIAGNOSIS — M17.11 PATELLOFEMORAL ARTHRITIS OF RIGHT KNEE: ICD-10-CM

## 2023-05-24 DIAGNOSIS — M17.12 PRIMARY OSTEOARTHRITIS OF LEFT KNEE: Primary | ICD-10-CM

## 2023-05-24 RX ORDER — METHYLPREDNISOLONE ACETATE 40 MG/ML
40 INJECTION, SUSPENSION INTRA-ARTICULAR; INTRALESIONAL; INTRAMUSCULAR; SOFT TISSUE ONCE
Status: COMPLETED | OUTPATIENT
Start: 2023-05-24 | End: 2023-05-24

## 2023-05-24 RX ORDER — CELECOXIB 200 MG/1
200 CAPSULE ORAL DAILY
Qty: 60 CAPSULE | Refills: 3 | Status: SHIPPED | OUTPATIENT
Start: 2023-05-24

## 2023-05-24 RX ADMIN — METHYLPREDNISOLONE ACETATE 40 MG: 40 INJECTION, SUSPENSION INTRA-ARTICULAR; INTRALESIONAL; INTRAMUSCULAR; SOFT TISSUE at 15:48

## 2023-05-24 NOTE — PROGRESS NOTES
5/24/23 3:48 PM     Lidocaine Injection      NDC: [address]-76    Lot Number: [id number]    Body Part: left knee
was obtained during today's visit. Assessment :  62year old female with bilateral knee osteoarthritis, left is more symptomatic    Impression:  Encounter Diagnoses   Name Primary? Primary osteoarthritis of left knee Yes    S/P left knee arthroscopy     Patellofemoral arthritis of right knee        Office Procedures:  No orders of the defined types were placed in this encounter. Plan: The nature and natural history of osteoarthritis was discussed in detail the patient today. Treatment options both surgical and nonsurgical were discussed in detail. Patient was counseled with regard to the importance of activity modification as well as weight control. The role for medications, intra-articular injections as well as surgery were discussed. Patient's questions were answered. Patient has left knee osteoarthritis which is limiting day-to-day activities and significantly impacting quality of life. Treatment has included exercises as well as anti-inflammatories medications and steroid injections with minimal relief. Symptoms have been ongoing for over a year. At this point the patient is reasonable candidate for total knee arthroplasty. She does suggest she may have a nickel allergy based on the types of earrings she can wear so we recommend a consult with an allergist to test for metal allergies in prelude to an upcoming total knee replacement to determine which components to proceed with. In the interim, she has a vacation planned to [facility] in 2 weeks. We will provide her with an intraarticular cortisone injection for her left knee exacerbation of symptoms. We also discussed switching her anti-inflammatory from meloxicam to Celebrex. She wishes to proceed. If she would like a steroid injection for the right knee before she leaves for vacation she is able to see Phill Zavala PA-C next week.     The indications and risks of steroid injection as well as treatment alternatives were discussed with

## 2023-07-12 ENCOUNTER — OFFICE VISIT (OUTPATIENT)
Dept: PRIMARY CARE CLINIC | Age: 58
End: 2023-07-12
Payer: COMMERCIAL

## 2023-07-12 VITALS
BODY MASS INDEX: 32.08 KG/M2 | OXYGEN SATURATION: 96 % | HEIGHT: 67 IN | WEIGHT: 204.4 LBS | DIASTOLIC BLOOD PRESSURE: 76 MMHG | HEART RATE: 85 BPM | SYSTOLIC BLOOD PRESSURE: 110 MMHG

## 2023-07-12 DIAGNOSIS — M17.12 PRIMARY OSTEOARTHRITIS OF LEFT KNEE: ICD-10-CM

## 2023-07-12 DIAGNOSIS — Z00.00 ANNUAL PHYSICAL EXAM: Primary | ICD-10-CM

## 2023-07-12 DIAGNOSIS — C50.911 MALIGNANT NEOPLASM OF RIGHT FEMALE BREAST, UNSPECIFIED ESTROGEN RECEPTOR STATUS, UNSPECIFIED SITE OF BREAST (HCC): ICD-10-CM

## 2023-07-12 DIAGNOSIS — E78.00 HYPERCHOLESTEREMIA: ICD-10-CM

## 2023-07-12 DIAGNOSIS — Z23 NEED FOR PROPHYLACTIC VACCINATION WITH TETANUS-DIPHTHERIA (TD): ICD-10-CM

## 2023-07-12 DIAGNOSIS — E66.9 OBESITY (BMI 30.0-34.9): ICD-10-CM

## 2023-07-12 DIAGNOSIS — G25.81 RLS (RESTLESS LEGS SYNDROME): ICD-10-CM

## 2023-07-12 DIAGNOSIS — F33.1 MAJOR DEPRESSIVE DISORDER, RECURRENT, MODERATE (HCC): ICD-10-CM

## 2023-07-12 DIAGNOSIS — M13.0 POLYARTHRITIS: ICD-10-CM

## 2023-07-12 DIAGNOSIS — Z00.00 ANNUAL PHYSICAL EXAM: ICD-10-CM

## 2023-07-12 LAB
ALBUMIN SERPL-MCNC: 4.4 G/DL (ref 3.4–5)
ALP SERPL-CCNC: 112 U/L (ref 40–129)
ALT SERPL-CCNC: 19 U/L (ref 10–40)
ANION GAP SERPL CALCULATED.3IONS-SCNC: 9 MMOL/L (ref 3–16)
AST SERPL-CCNC: 26 U/L (ref 15–37)
BASOPHILS # BLD: 0.1 K/UL (ref 0–0.2)
BASOPHILS NFR BLD: 1.3 %
BILIRUB DIRECT SERPL-MCNC: <0.2 MG/DL (ref 0–0.3)
BILIRUB INDIRECT SERPL-MCNC: NORMAL MG/DL (ref 0–1)
BILIRUB SERPL-MCNC: <0.2 MG/DL (ref 0–1)
BUN SERPL-MCNC: 23 MG/DL (ref 7–20)
CALCIUM SERPL-MCNC: 9.2 MG/DL (ref 8.3–10.6)
CHLORIDE SERPL-SCNC: 106 MMOL/L (ref 99–110)
CHOLEST SERPL-MCNC: 235 MG/DL (ref 0–199)
CO2 SERPL-SCNC: 26 MMOL/L (ref 21–32)
CREAT SERPL-MCNC: 1 MG/DL (ref 0.6–1.1)
DEPRECATED RDW RBC AUTO: 14.1 % (ref 12.4–15.4)
EOSINOPHIL # BLD: 0.4 K/UL (ref 0–0.6)
EOSINOPHIL NFR BLD: 7.1 %
GFR SERPLBLD CREATININE-BSD FMLA CKD-EPI: >60 ML/MIN/{1.73_M2}
GLUCOSE SERPL-MCNC: 102 MG/DL (ref 70–99)
HCT VFR BLD AUTO: 42 % (ref 36–48)
HDLC SERPL-MCNC: 53 MG/DL (ref 40–60)
HGB BLD-MCNC: 14.2 G/DL (ref 12–16)
LDLC SERPL CALC-MCNC: 135 MG/DL
LYMPHOCYTES # BLD: 1.2 K/UL (ref 1–5.1)
LYMPHOCYTES NFR BLD: 22.3 %
MCH RBC QN AUTO: 30.8 PG (ref 26–34)
MCHC RBC AUTO-ENTMCNC: 33.7 G/DL (ref 31–36)
MCV RBC AUTO: 91.3 FL (ref 80–100)
MONOCYTES # BLD: 0.4 K/UL (ref 0–1.3)
MONOCYTES NFR BLD: 7.9 %
NEUTROPHILS # BLD: 3.4 K/UL (ref 1.7–7.7)
NEUTROPHILS NFR BLD: 61.4 %
PLATELET # BLD AUTO: 275 K/UL (ref 135–450)
PMV BLD AUTO: 8.4 FL (ref 5–10.5)
POTASSIUM SERPL-SCNC: 5 MMOL/L (ref 3.5–5.1)
PROT SERPL-MCNC: 6.5 G/DL (ref 6.4–8.2)
RBC # BLD AUTO: 4.6 M/UL (ref 4–5.2)
SODIUM SERPL-SCNC: 141 MMOL/L (ref 136–145)
TRIGL SERPL-MCNC: 237 MG/DL (ref 0–150)
TSH SERPL DL<=0.005 MIU/L-ACNC: 3.17 UIU/ML (ref 0.27–4.2)
VLDLC SERPL CALC-MCNC: 47 MG/DL
WBC # BLD AUTO: 5.5 K/UL (ref 4–11)

## 2023-07-12 PROCEDURE — 90471 IMMUNIZATION ADMIN: CPT | Performed by: FAMILY MEDICINE

## 2023-07-12 PROCEDURE — 99396 PREV VISIT EST AGE 40-64: CPT | Performed by: FAMILY MEDICINE

## 2023-07-12 PROCEDURE — 90714 TD VACC NO PRESV 7 YRS+ IM: CPT | Performed by: FAMILY MEDICINE

## 2023-07-12 RX ORDER — CELECOXIB 200 MG/1
200 CAPSULE ORAL DAILY
Qty: 60 CAPSULE | Refills: 3 | Status: SHIPPED | OUTPATIENT
Start: 2023-07-12

## 2023-07-12 SDOH — ECONOMIC STABILITY: FOOD INSECURITY: WITHIN THE PAST 12 MONTHS, YOU WORRIED THAT YOUR FOOD WOULD RUN OUT BEFORE YOU GOT MONEY TO BUY MORE.: NEVER TRUE

## 2023-07-12 SDOH — ECONOMIC STABILITY: FOOD INSECURITY: WITHIN THE PAST 12 MONTHS, THE FOOD YOU BOUGHT JUST DIDN'T LAST AND YOU DIDN'T HAVE MONEY TO GET MORE.: NEVER TRUE

## 2023-07-12 SDOH — ECONOMIC STABILITY: HOUSING INSECURITY
IN THE LAST 12 MONTHS, WAS THERE A TIME WHEN YOU DID NOT HAVE A STEADY PLACE TO SLEEP OR SLEPT IN A SHELTER (INCLUDING NOW)?: NO

## 2023-07-12 SDOH — ECONOMIC STABILITY: INCOME INSECURITY: HOW HARD IS IT FOR YOU TO PAY FOR THE VERY BASICS LIKE FOOD, HOUSING, MEDICAL CARE, AND HEATING?: NOT HARD AT ALL

## 2023-07-12 ASSESSMENT — ENCOUNTER SYMPTOMS
RESPIRATORY NEGATIVE: 1
EYES NEGATIVE: 1
ALLERGIC/IMMUNOLOGIC NEGATIVE: 1
GASTROINTESTINAL NEGATIVE: 1

## 2023-07-12 NOTE — PROGRESS NOTES
Immunization(s) given during visit:     Immunizations Administered       Name Date Dose Route    TD 2LF, TDVAX, (age 7y+), IM, 0.5mL 7/12/2023 0.5 mL Intramuscular    Site: Deltoid- Left    Lot: [id number]    NDC: 59440-729-97

## 2023-07-12 NOTE — PROGRESS NOTES
SUBJECTIVE:  Patient ID: Marlon Morales is a 62 y.o. y.o. female     HPI   Patient here to establish with me  She is to see Dr. Gia Elena  She is here for annual physical exam/wellness she works for [employer] doing okay.   Patient with prior history of restless leg syndrome she is on 2 medication was told she needs to take iron she has not been taking she will go back into taking iron we will try to titrate off Sinemet she will start taking every other day  Patient with polyarthritis sees Dr. Italo Hatch on Celebrex  Patient hyperlipidemia on Crestor fasting today we will do fasting blood work  Patient with prior breast cancer stable sees by oncology once a year  Patient with depression on Effexor 75 mg stable  She had gained weight Dr. Gia Elena tried to prescribe Bertis Windham did not cover it on her insurance and she knows they do not cover any weight management medication  Past Medical History:   Diagnosis Date    ADHD (attention deficit hyperactivity disorder) 2010    ADD    Allergic rhinitis 2000    Itching    Anxiety     Back pain     Breast cancer ([address]) 2020    right    Breast Cancer - right breast ([address])     Dr. Mitchell Dickson: pN0 January 2021: ER/MN+, HER2 Neg :  Aromatase Inhibitor tx (will need DEXA)    History of therapeutic radiation     Hypercholesteremia     Obstructive sleep apnea (adult) (pediatric)     APAP 10-18 (ave 12 cwp)    Osteoarthritis 2022    Knees    PONV (postoperative nausea and vomiting)     Restless leg     Sleep apnea     uses cpap      Past Surgical History:   Procedure Laterality Date    ACHILLES TENDON SURGERY Right     BREAST BIOPSY Right 01/26/2021    RIGHT SENTINEL LYMPH NODE BIOPSY performed by Jae Mantilla DO at [address] [place] [address]  10/29/2020    BREAST REDUCTION SURGERY Bilateral 10/29/2020    BILATERAL BREAST REDUCTION AND SPY performed by Inocente Donahue MD at [address] [facility] 06/11/2020    COLORECTAL CANCER SCREENING, HIGH RISK

## 2023-07-25 ENCOUNTER — PATIENT MESSAGE (OUTPATIENT)
Dept: PRIMARY CARE CLINIC | Age: 58
End: 2023-07-25

## 2023-07-25 NOTE — TELEPHONE ENCOUNTER
From: Kwame Antonio  To: Dr. Hussain Barajas: 7/25/2023 1:22 PM EDT  Subject: Good afternoon,    Per your recommendation, I've weaned off of the Carbidopa-Levodopa. It's been over a week since I've had the medication. Could you discontinue the medication, so I won't continue to receive the medication via [employer].     Thank you,  Ruy Almanzar

## 2023-08-02 DIAGNOSIS — G25.81 RESTLESS LEG: ICD-10-CM

## 2023-08-02 NOTE — PROGRESS NOTES
Medication:   Requested Prescriptions     Pending Prescriptions Disp Refills    carbidopa-levodopa (SINEMET)  MG per tablet 90 tablet 3     Sig: Take 1 tablet by mouth daily        Last Filled:      Patient Phone Number: [phone] (home)     Last appt: 5/17/2023   Next appt: Visit date not found    Last OARRS: No flowsheet data found.

## 2023-08-03 DIAGNOSIS — G25.81 RESTLESS LEG: ICD-10-CM

## 2023-08-03 RX ORDER — CARBIDOPA/LEVODOPA 25MG-250MG
1 TABLET ORAL DAILY
Qty: 90 TABLET | Refills: 1 | Status: SHIPPED | OUTPATIENT
Start: 2023-08-03 | End: 2023-08-03 | Stop reason: ALTCHOICE

## 2023-09-20 ENCOUNTER — OFFICE VISIT (OUTPATIENT)
Dept: ORTHOPEDIC SURGERY | Age: 58
End: 2023-09-20
Payer: COMMERCIAL

## 2023-09-20 VITALS — BODY MASS INDEX: 31.39 KG/M2 | WEIGHT: 200 LBS | HEIGHT: 67 IN

## 2023-09-20 DIAGNOSIS — M17.12 PRIMARY OSTEOARTHRITIS OF LEFT KNEE: Primary | ICD-10-CM

## 2023-09-20 PROCEDURE — 99214 OFFICE O/P EST MOD 30 MIN: CPT | Performed by: ORTHOPAEDIC SURGERY

## 2023-09-22 ENCOUNTER — OFFICE VISIT (OUTPATIENT)
Dept: ORTHOPEDIC SURGERY | Age: 58
End: 2023-09-22

## 2023-09-22 VITALS — BODY MASS INDEX: 31.39 KG/M2 | WEIGHT: 200 LBS | HEIGHT: 67 IN

## 2023-09-22 DIAGNOSIS — M17.12 PRIMARY OSTEOARTHRITIS OF LEFT KNEE: ICD-10-CM

## 2023-09-22 DIAGNOSIS — Z01.818 PRE-OP TESTING: Primary | ICD-10-CM

## 2023-09-22 NOTE — PROGRESS NOTES
Dr Jag Vaughn      Date /Time 9/22/2023       8:44 AM EDT  Name Mary Brown             1965   [id number]                Chief Complaint   Patient presents with    Knee Pain     N L KNEE/ REFER [address]        History of Present Illness  Mary Brown is a 62 y.o. female who presents with  left knee pain, . Injury Mechanism:  none. Worker's Comp. & legal issues:   none. Previous Treatments: Ice, Heat, and NSAIDs    Patient presents to the office today for a new problem. Patient here with a chief complaint of left knee pain. Patient has had left knee pain for over a year. She did have a meniscal root repair done July 2022. She continued to have pain postoperatively. Today have done multiple cortisone injections. She has also tried Celebrex, Flexeril, and multiple sessions of physical therapy without lasting improvement. Patient sent over in consultation by Dr. Conchis Martin. Patient's pain symptoms are tricompartment in nature. They are not isolated to the medial compartment.     Past History  Past Medical History:   Diagnosis Date    ADHD (attention deficit hyperactivity disorder) 2010    ADD    Allergic rhinitis 2000    Itching    Anxiety     Back pain     Breast cancer ([address]) 2020    right    Breast Cancer - right breast ([address])     Dr. Reyna Ashford: pN0 January 2021: ER/ME+, HER2 Neg :  Aromatase Inhibitor tx (will need DEXA)    History of therapeutic radiation     Hypercholesteremia     Obstructive sleep apnea (adult) (pediatric)     APAP 10-18 (ave 12 cwp)    Osteoarthritis 2022    Knees    PONV (postoperative nausea and vomiting)     Restless leg     Sleep apnea     uses cpap     Past Surgical History:   Procedure Laterality Date    ACHILLES TENDON SURGERY Right     BREAST BIOPSY Right 01/26/2021    RIGHT SENTINEL LYMPH NODE BIOPSY performed by Nayana Nichols DO at [address] [place] [address] Rd  10/29/2020    BREAST REDUCTION SURGERY

## 2023-10-09 DIAGNOSIS — G25.81 RLS (RESTLESS LEGS SYNDROME): ICD-10-CM

## 2023-10-09 RX ORDER — PRAMIPEXOLE DIHYDROCHLORIDE 1.5 MG/1
0.75 TABLET ORAL EVERY EVENING
Qty: 15 TABLET | Refills: 0 | Status: SHIPPED | OUTPATIENT
Start: 2023-10-09 | End: 2023-10-12 | Stop reason: SDUPTHER

## 2023-10-09 NOTE — TELEPHONE ENCOUNTER
Medication:   Requested Prescriptions     Pending Prescriptions Disp Refills    pramipexole (MIRAPEX) 1.5 MG tablet 15 tablet 0     Sig: Take 0.5 tablets by mouth every evening     Last Filled:  01/18/2023    Last appt: 7/12/2023   Next appt: 10/26/2023    Last OARRS:        No data to display

## 2023-10-11 ENCOUNTER — PATIENT MESSAGE (OUTPATIENT)
Dept: PRIMARY CARE CLINIC | Age: 58
End: 2023-10-11

## 2023-10-11 DIAGNOSIS — E78.00 HYPERCHOLESTEREMIA: ICD-10-CM

## 2023-10-11 DIAGNOSIS — G25.81 RLS (RESTLESS LEGS SYNDROME): ICD-10-CM

## 2023-10-11 NOTE — TELEPHONE ENCOUNTER
Medication:   Requested Prescriptions     Pending Prescriptions Disp Refills    pramipexole (MIRAPEX) 1.5 MG tablet 15 tablet 0     Sig: Take 0.5 tablets by mouth every evening    rosuvastatin (CRESTOR) 10 MG tablet 30 tablet 3     Sig: Take 1 tablet by mouth nightly     Last Filled:  10.9. 23 went to [facility]  10.24.22  Last appt: 7/12/2023   Next appt: 10/26/2023    Last Lipid:   Lab Results   Component Value Date/Time    CHOL 235 07/12/2023 09:43 AM    TRIG 237 07/12/2023 09:43 AM    HDL 53 07/12/2023 09:43 AM    HDL 68 12/02/2011 08:35 AM    LDLCALC 135 07/12/2023 09:43 AM

## 2023-10-11 NOTE — TELEPHONE ENCOUNTER
From: Nichelle Petit  To: Dr. López Xiang: 10/11/2023 11:22 AM EDT  Subject: Prescription Refill    Good morning,    [facility] has been trying to contact you concerning refills for Rosuvastatin and Pramipexole. Could you contact them and approve refills for these medications.     Thank you, Ryanne Starr

## 2023-10-12 RX ORDER — PRAMIPEXOLE DIHYDROCHLORIDE 1.5 MG/1
0.75 TABLET ORAL EVERY EVENING
Qty: 15 TABLET | Refills: 0 | Status: SHIPPED | OUTPATIENT
Start: 2023-10-12 | End: 2023-11-11

## 2023-10-12 RX ORDER — ROSUVASTATIN CALCIUM 10 MG/1
10 TABLET, COATED ORAL NIGHTLY
Qty: 30 TABLET | Refills: 3 | Status: SHIPPED | OUTPATIENT
Start: 2023-10-12

## 2023-10-13 DIAGNOSIS — Z01.818 PRE-OP TESTING: ICD-10-CM

## 2023-10-13 DIAGNOSIS — M17.12 PRIMARY OSTEOARTHRITIS OF LEFT KNEE: ICD-10-CM

## 2023-10-13 LAB
ABO + RH BLD: NORMAL
ALBUMIN SERPL-MCNC: 4.3 G/DL (ref 3.4–5)
ANION GAP SERPL CALCULATED.3IONS-SCNC: 9 MMOL/L (ref 3–16)
APTT BLD: 33.5 SEC (ref 22.7–35.9)
BACTERIA URNS QL MICRO: NORMAL /HPF
BASOPHILS # BLD: 0.1 K/UL (ref 0–0.2)
BASOPHILS NFR BLD: 1.4 %
BILIRUB UR QL STRIP.AUTO: NEGATIVE
BLD GP AB SCN SERPL QL: NORMAL
BUN SERPL-MCNC: 19 MG/DL (ref 7–20)
CALCIUM SERPL-MCNC: 9.1 MG/DL (ref 8.3–10.6)
CHLORIDE SERPL-SCNC: 106 MMOL/L (ref 99–110)
CLARITY UR: CLEAR
CO2 SERPL-SCNC: 25 MMOL/L (ref 21–32)
COLOR UR: YELLOW
CREAT SERPL-MCNC: 1 MG/DL (ref 0.6–1.1)
DEPRECATED RDW RBC AUTO: 13.4 % (ref 12.4–15.4)
EOSINOPHIL # BLD: 0.3 K/UL (ref 0–0.6)
EOSINOPHIL NFR BLD: 5.5 %
EPI CELLS #/AREA URNS AUTO: 1 /HPF (ref 0–5)
GFR SERPLBLD CREATININE-BSD FMLA CKD-EPI: >60 ML/MIN/{1.73_M2}
GLUCOSE SERPL-MCNC: 113 MG/DL (ref 70–99)
GLUCOSE UR STRIP.AUTO-MCNC: NEGATIVE MG/DL
HCT VFR BLD AUTO: 43.1 % (ref 36–48)
HGB BLD-MCNC: 14.7 G/DL (ref 12–16)
HGB UR QL STRIP.AUTO: ABNORMAL
HYALINE CASTS #/AREA URNS AUTO: 0 /LPF (ref 0–8)
INR PPP: 0.99 (ref 0.84–1.16)
KETONES UR STRIP.AUTO-MCNC: NEGATIVE MG/DL
LEUKOCYTE ESTERASE UR QL STRIP.AUTO: ABNORMAL
LYMPHOCYTES # BLD: 1.5 K/UL (ref 1–5.1)
LYMPHOCYTES NFR BLD: 30.9 %
MCH RBC QN AUTO: 30.4 PG (ref 26–34)
MCHC RBC AUTO-ENTMCNC: 34.1 G/DL (ref 31–36)
MCV RBC AUTO: 89.2 FL (ref 80–100)
MONOCYTES # BLD: 0.4 K/UL (ref 0–1.3)
MONOCYTES NFR BLD: 8.6 %
NEUTROPHILS # BLD: 2.5 K/UL (ref 1.7–7.7)
NEUTROPHILS NFR BLD: 53.6 %
NITRITE UR QL STRIP.AUTO: NEGATIVE
PH UR STRIP.AUTO: 5.5 [PH] (ref 5–8)
PLATELET # BLD AUTO: 295 K/UL (ref 135–450)
PMV BLD AUTO: 8.3 FL (ref 5–10.5)
POTASSIUM SERPL-SCNC: 4.5 MMOL/L (ref 3.5–5.1)
PROT UR STRIP.AUTO-MCNC: NEGATIVE MG/DL
PROTHROMBIN TIME: 13.1 SEC (ref 11.5–14.8)
RBC # BLD AUTO: 4.83 M/UL (ref 4–5.2)
RBC CLUMPS #/AREA URNS AUTO: 2 /HPF (ref 0–4)
SODIUM SERPL-SCNC: 140 MMOL/L (ref 136–145)
SP GR UR STRIP.AUTO: 1.02 (ref 1–1.03)
TRANSFERRIN SERPL-MCNC: 249 MG/DL (ref 200–360)
UA DIPSTICK W REFLEX MICRO PNL UR: YES
URN SPEC COLLECT METH UR: ABNORMAL
UROBILINOGEN UR STRIP-ACNC: 0.2 E.U./DL
WBC # BLD AUTO: 4.7 K/UL (ref 4–11)
WBC #/AREA URNS AUTO: 3 /HPF (ref 0–5)

## 2023-10-14 LAB
BACTERIA UR CULT: NORMAL
EST. AVERAGE GLUCOSE BLD GHB EST-MCNC: 114 MG/DL
HBA1C MFR BLD: 5.6 %

## 2023-10-15 LAB — MRSA SPEC QL CULT: NORMAL

## 2023-10-19 DIAGNOSIS — G25.81 RLS (RESTLESS LEGS SYNDROME): ICD-10-CM

## 2023-10-19 RX ORDER — PRAMIPEXOLE DIHYDROCHLORIDE 1.5 MG/1
0.75 TABLET ORAL EVERY EVENING
Qty: 15 TABLET | Refills: 0 | Status: SHIPPED | OUTPATIENT
Start: 2023-10-19 | End: 2023-11-18

## 2023-10-19 RX ORDER — PRAMIPEXOLE DIHYDROCHLORIDE 1.5 MG/1
TABLET ORAL
Qty: 15 TABLET | Refills: 0 | OUTPATIENT
Start: 2023-10-19

## 2023-10-19 NOTE — TELEPHONE ENCOUNTER
Medication Refill Request  pramipexole (MIRAPEX) 1.5 MG tablet  *Requesting a 90-day supply    Last OV 7/12/2023  Please send to:  [address] [place] [address] Drive, [address] [place] [address]

## 2023-10-19 NOTE — TELEPHONE ENCOUNTER
Medication:   Requested Prescriptions     Pending Prescriptions Disp Refills    pramipexole (MIRAPEX) 1.5 MG tablet 15 tablet 0     Sig: Take 0.5 tablets by mouth every evening     Last Filled:  10/12/23-  wants 90 day supply     Last appt: 7/12/2023   Next appt: 10/26/2023    Last Labs DM:   Lab Results   Component Value Date/Time    LABA1C 5.6 10/13/2023 08:37 AM     Last Lipid:   Lab Results   Component Value Date/Time    CHOL 235 07/12/2023 09:43 AM    TRIG 237 07/12/2023 09:43 AM    HDL 53 07/12/2023 09:43 AM    HDL 68 12/02/2011 08:35 AM    LDLCALC 135 07/12/2023 09:43 AM     Last PSA: No results found for: \"PSA\"  Last Thyroid:   Lab Results   Component Value Date/Time    TSH 3.17 07/12/2023 09:43 AM

## 2023-10-26 ENCOUNTER — HOSPITAL ENCOUNTER (OUTPATIENT)
Dept: PHYSICAL THERAPY | Age: 58
Setting detail: THERAPIES SERIES
Discharge: HOME | End: 2023-10-26
Attending: ORTHOPAEDIC SURGERY

## 2023-10-26 ENCOUNTER — OFFICE VISIT (OUTPATIENT)
Dept: PRIMARY CARE CLINIC | Age: 58
End: 2023-10-26
Payer: COMMERCIAL

## 2023-10-26 VITALS
HEIGHT: 67 IN | HEART RATE: 104 BPM | SYSTOLIC BLOOD PRESSURE: 130 MMHG | DIASTOLIC BLOOD PRESSURE: 80 MMHG | OXYGEN SATURATION: 94 % | WEIGHT: 209.2 LBS | TEMPERATURE: 97.4 F | BODY MASS INDEX: 32.83 KG/M2

## 2023-10-26 DIAGNOSIS — Z23 NEED FOR INFLUENZA VACCINATION: ICD-10-CM

## 2023-10-26 DIAGNOSIS — G25.81 RLS (RESTLESS LEGS SYNDROME): ICD-10-CM

## 2023-10-26 DIAGNOSIS — Z01.818 PRE-OP EXAM: Primary | ICD-10-CM

## 2023-10-26 DIAGNOSIS — M25.562 CHRONIC PAIN OF LEFT KNEE: Primary | ICD-10-CM

## 2023-10-26 DIAGNOSIS — E78.00 HYPERCHOLESTEREMIA: ICD-10-CM

## 2023-10-26 DIAGNOSIS — M17.12 PRIMARY OSTEOARTHRITIS OF LEFT KNEE: ICD-10-CM

## 2023-10-26 DIAGNOSIS — G89.29 CHRONIC PAIN OF LEFT KNEE: Primary | ICD-10-CM

## 2023-10-26 PROCEDURE — 90674 CCIIV4 VAC NO PRSV 0.5 ML IM: CPT | Performed by: FAMILY MEDICINE

## 2023-10-26 PROCEDURE — 93000 ELECTROCARDIOGRAM COMPLETE: CPT | Performed by: FAMILY MEDICINE

## 2023-10-26 PROCEDURE — 99214 OFFICE O/P EST MOD 30 MIN: CPT | Performed by: FAMILY MEDICINE

## 2023-10-26 PROCEDURE — 90471 IMMUNIZATION ADMIN: CPT | Performed by: FAMILY MEDICINE

## 2023-10-26 RX ORDER — ROSUVASTATIN CALCIUM 10 MG/1
10 TABLET, COATED ORAL NIGHTLY
Qty: 90 TABLET | Refills: 1 | Status: SHIPPED | OUTPATIENT
Start: 2023-10-26

## 2023-10-26 RX ORDER — PRAMIPEXOLE DIHYDROCHLORIDE 1.5 MG/1
1.5 TABLET ORAL EVERY EVENING
Qty: 90 TABLET | Refills: 1 | Status: SHIPPED | OUTPATIENT
Start: 2023-10-26 | End: 2023-11-25

## 2023-10-26 ASSESSMENT — ENCOUNTER SYMPTOMS
EYES NEGATIVE: 1
RESPIRATORY NEGATIVE: 1
ALLERGIC/IMMUNOLOGIC NEGATIVE: 1
GASTROINTESTINAL NEGATIVE: 1

## 2023-10-26 NOTE — FLOWSHEET NOTE
machines       Leg press        Leg extension       Leg curl               Manual interventions                             Modalities:    No modalities applied this session    Education/Home Exercise Program: Patient HEP program created electronically. Refer to [address] access code: [email or web address]      ASSESSMENT     Today's Assessment: See Eval    Medical Necessity Documentation:  I certify that this patient meets the below criteria necessary for medical necessity for care and/or justification of therapy services: The patient has a musculoskeletal condition(s) with a corresponding ICD-10 code that is of complexity and severity that require skilled therapeutic intervention. This has a direct and significant impact on the need for therapy and significantly impacts the rate of recovery. Treatment/Activity Tolerance:  [x] Patient tolerated treatment well [] Patient limited by fatique  [] Patient limited by pain  [] Patient limited by other medical complications  [] Other:     Return to Play: NA    Prognosis for POC: [x] Good [] Fair  [] Poor    Patient requires continued skilled intervention: [x] Yes  [] No      GOALS     Patient stated goal: Return to pickle ball and golf after surgery   [] Progressing: [] Met: [] Not Met: [] Adjusted     Therapist goals for Patient:   Short Term Goals: To be achieved in: 1-2 visit(s)  1. Independent in HEP and progression per patient tolerance, in order to prevent re-injury. [] Progressing: [] Met: [] Not Met: [] Adjusted  2. All patient questions regarding expectations for rehab following upcoming surgery are answered. [] Progressing: [] Met: [] Not Met: [] Adjusted     Long Term Goals: long term goals to be determined at re-eval following upcoming surgery    Overall Progression Towards Functional goals/ Treatment Progress Update:  [] Patient is progressing as expected towards functional goals listed. [] Progression is slowed due to complexities/Impairments listed.   []

## 2023-10-26 NOTE — PROGRESS NOTES
SUBJECTIVE:  Peter Mcgee is a 62 y.o. female who presents for evaluation for left knee total replacements . The pain is described as constant and is 6/10 in intensity. Onset was several months ago. Symptoms have been gradually worsening since. Aggravating factors:any weight bearing. Alleviating factors: rest. Associated symptoms: pain. The patient denies chest pain or SOB, no fever or chills, no ST or cough, no N/V/D. Review of Systems   Constitutional: Negative. HENT: Negative. Eyes: Negative. Respiratory: Negative. Cardiovascular: Negative. Gastrointestinal: Negative. Endocrine: Negative. Genitourinary: Negative. Musculoskeletal:  Positive for arthralgias. Allergic/Immunologic: Negative. Neurological: Negative. Hematological: Negative. Psychiatric/Behavioral: Negative. All other systems reviewed and are negative.      Past Medical History:   Diagnosis Date    ADHD (attention deficit hyperactivity disorder) 2010    ADD    Allergic rhinitis 2000    Itching    Anxiety     Back pain     Breast cancer ([address]) 2020    right    Breast Cancer - right breast ([address])     Dr. Tan Mai: pN0 January 2021: ER/AZ+, HER2 Neg :  Aromatase Inhibitor tx (will need DEXA)    History of therapeutic radiation     Hypercholesteremia     Obstructive sleep apnea (adult) (pediatric)     APAP 10-18 (ave 12 cwp)    Osteoarthritis 2022    Knees    PONV (postoperative nausea and vomiting)     Restless leg     Sleep apnea     uses cpap      Patient Active Problem List    Diagnosis Date Noted    RLS (restless legs syndrome) 11/03/2022    Obesity (BMI 30.0-34.9) 11/03/2022    Primary osteoarthritis of left knee 07/12/2023    Major depressive disorder, recurrent, moderate 05/17/2023    Breast cancer, right breast ([address])     Rosacea 02/13/2019    Periorificial dermatitis 02/13/2019    Tobacco abuse 04/09/2018    Obstructive sleep apnea     Lumbago 03/06/2014    Depression 07/10/2012    Hypercholesteremia

## 2023-11-03 ENCOUNTER — TELEPHONE (OUTPATIENT)
Dept: ORTHOPEDIC SURGERY | Age: 58
End: 2023-11-03

## 2023-11-03 NOTE — TELEPHONE ENCOUNTER
Orthopedic Nurse Navigator Summary    Patient Name:  Moisés Martinez  Anticipated Date of Surgery:  11/15/23  Attended Pre-op Education Class:  Video sent to patient email 10/27/23- she watched it  PCP: Ana Luisa Alan MD  Date of PCP visit for H&P: 10/26/23  Is patient in a Pain Management program:  Review of Medical history reveals history of: Hypercholesterolemia, H/O Rt breast Cancer- radiation, TANYA- CPAP, PONV    Critical Lab Values  - Hemoglobin (g/dL):  Date: 10/13/23 Value 14.7  - Hematocrit(%): Date:  10/13/23  Value 43.1  - HgbA1C:  Date: 10/13/23  Value 5.6  - Albumin:  Date: 10/13/23  Value 4.3  - BUN:  Date: 10/13/23  Value 19  - Creatinine:  Date: 10/13/23  Value 1.0    10/13/23 MRSA swab- negative    Coronary Artery Disease/HTN/CHF history: No  Cardiologist:  Cardiac clearance necessary:  No  Date of cardiac clearance appt:  Final Cardiac recommendations: On any anticoagulation: No    Diabetes History:  No  Most recent HgbA1C: 5.6  Pulmonary:  COPD/Emphysema/Use of home oxygen: No  Alcohol use: No    BMI greater than 40 at time of scheduling:  No  Additional medical concerns:   Additional recommendations for above concerns:  Attended Pre-Hab program:  Yes  Anticipated Discharge Disposition:  Home with OPT  Who will be with patient at home following discharge:  sister will stay with her  Equipment patient already has:  walker, cane, crutches, shower seat, ice machine, SCD machine- she borrowed from her brother- she has no history of blood clots  Bedroom on first or second floor:  6 steps up to get to her bedroom  Bathroom on first or second floor:  6 steps up to get to her bedroom  Weight bearing status:  wbat  Pre-op ambulatory status: painful ambulation  Number of entry steps:  2  Caregiver assistance:  full time    Ranjeet Canas RN  Date:  11/03/23

## 2023-11-08 ENCOUNTER — TELEPHONE (OUTPATIENT)
Dept: ORTHOPEDIC SURGERY | Age: 58
End: 2023-11-08

## 2023-11-08 NOTE — PROGRESS NOTES
Total Joint video emailed & reviewed to patient by Kaylan Palacios. Hibiclens instructions reviewed to use x 5 days preop. Patient already diagnosed with TANYA and uses a CPAP routinely. She will bring it DOS. TJ book, IS instructions, TJ video link, and fall contract placed on chart for DOS.    SC

## 2023-11-08 NOTE — TELEPHONE ENCOUNTER
General Question     Subject: PRE OP QUESTION  Patient and /or Facility Request: Dignaantonietta Jacky  Contact Number: [phone]    PT CLLED TO [address] [place] [address] ON TIME OF ARRIVAL FOR SX AND SHE SAID SHE NEEDED THE SCRIPTED FOR PRE OP MED TO BE SENT TO [facility] ON [address]

## 2023-11-08 NOTE — PROGRESS NOTES
[facility] PRE-SURGICAL TESTING INSTRUCTIONS                      PRIOR TO PROCEDURE DATE:    1. PLEASE FOLLOW ANY INSTRUCTIONS GIVEN TO YOU PER YOUR SURGEON. 2. Arrange for someone to drive you home and be with you for the first 24 hours after discharge for your safety after your procedure for which you received sedation. Ensure it is someone we can share information with regarding your discharge. NOTE: At this time ONLY 2 ADULTS may accompany you   One person ENCOURAGED to stay at hospital entire time if outpatient surgery      3. You must contact your surgeon for instructions IF:  You are taking any blood thinners, aspirin, anti-inflammatory or vitamins. There is a change in your physical condition such as a cold, fever, rash, cuts, sores, or any other infection, especially near your surgical site. 4. Do not drink alcohol the day before or day of your procedure. Do not use any recreational marijuana at least 24 hours or street drugs (heroin, cocaine) at minimum 5 days prior to your procedure. 5. A Pre-Surgical History and Physical MUST be completed WITHIN 30 DAYS OR LESS prior to your procedure. by your Physician or an Urgent Care        THE DAY OF YOUR PROCEDURE:  1. Follow instructions for ARRIVAL TIME as DIRECTED BY YOUR SURGEON. 2. Enter the MAIN entrance from [email or web address] and follow the signs to the free Parking [employer] or [employer] (offered free of charge 7 am-5pm). 3. Enter the Main Entrance of the hospital (do not enter from the lower level of the parking garage). Upon entrance, check in with the  at the surgical information desk on your LEFT. Bring your insurance card and photo ID to register      4. DO NOT EAT ANYTHING 8 hours prior to arrival for surgery. You may have up to 8 ounces of water 4 hours prior to your arrival for surgery.    NOTE: ALL Gastric, Bariatric & Bowel surgery patients - you MUST follow your surgeon's instructions regarding

## 2023-11-08 NOTE — PROGRESS NOTES
Place patient label inside box (if no patient label, complete below)  Name:  :    MR#:   Enrique Ocampo / PROCEDURE  I (we) Nathaliekuldeep Nuno (Patient Name) authorize Rajat Malloy MD (Provider / Triston Arellano) and/or such assistants as may be selected by him/her, to perform the following operation/procedure(s): LEFT KNEE TOTAL KNEE ARTHROPLASTY ROBOTIC           Note: If unable to obtain consent prior to an emergent procedure, document the emergent reason in the medical record. This procedure has been explained to my (our) satisfaction and included in the explanation was: The intended benefit, nature, and extent of the procedure to be performed; The significant risks involved and the probability of success; Alternative procedures and methods of treatment; The dangers and probable consequences of such alternatives (including no procedure or treatment); The expected consequences of the procedure on my future health; Whether other qualified individuals would be performing important surgical tasks and/or whether  would be present to advise or support the procedure. I (we) understand that there are other risks of infection and other serious complications in the pre-operative/procedural and postoperative/procedural stages of my (our) care. I (we) have asked all of the questions which I (we) thought were important in deciding whether or not to undergo treatment or diagnosis. These questions have been answered to my (our) satisfaction. I (we) understand that no assurance can be given that the procedure will be a success, and no guarantee or warranty of success has been given to me (us). It has been explained to me (us) that during the course of the operation/procedure, unforeseen conditions may be revealed that necessitate extension of the original procedure(s) or different procedure(s) than those set forth in Paragraph 1.  I (we) authorize and

## 2023-11-13 ENCOUNTER — TELEPHONE (OUTPATIENT)
Dept: ORTHOPEDIC SURGERY | Age: 58
End: 2023-11-13

## 2023-11-14 ENCOUNTER — ANESTHESIA EVENT (OUTPATIENT)
Dept: OPERATING ROOM | Age: 58
End: 2023-11-14
Payer: COMMERCIAL

## 2023-11-15 ENCOUNTER — APPOINTMENT (OUTPATIENT)
Dept: GENERAL RADIOLOGY | Age: 58
End: 2023-11-15
Attending: ORTHOPAEDIC SURGERY
Payer: COMMERCIAL

## 2023-11-15 ENCOUNTER — ANESTHESIA (OUTPATIENT)
Dept: OPERATING ROOM | Age: 58
End: 2023-11-15
Payer: COMMERCIAL

## 2023-11-15 ENCOUNTER — HOSPITAL ENCOUNTER (OUTPATIENT)
Age: 58
Discharge: HOME | End: 2023-11-15
Attending: ORTHOPAEDIC SURGERY | Admitting: PHYSICIAN ASSISTANT
Payer: COMMERCIAL

## 2023-11-15 VITALS
TEMPERATURE: 97.9 F | RESPIRATION RATE: 20 BRPM | HEART RATE: 106 BPM | DIASTOLIC BLOOD PRESSURE: 73 MMHG | WEIGHT: 209.2 LBS | SYSTOLIC BLOOD PRESSURE: 115 MMHG | HEIGHT: 67 IN | OXYGEN SATURATION: 93 % | BODY MASS INDEX: 32.83 KG/M2

## 2023-11-15 DIAGNOSIS — Z96.651 STATUS POST TOTAL RIGHT KNEE REPLACEMENT: Primary | ICD-10-CM

## 2023-11-15 DIAGNOSIS — M17.12 PRIMARY OSTEOARTHRITIS OF LEFT KNEE: Primary | ICD-10-CM

## 2023-11-15 LAB
ABO + RH BLD: NORMAL
BLD GP AB SCN SERPL QL: NORMAL
GLUCOSE BLD-MCNC: 93 MG/DL (ref 70–99)
GLUCOSE BLD-MCNC: 99 MG/DL (ref 70–99)
PERFORMED ON: NORMAL
PERFORMED ON: NORMAL

## 2023-11-15 PROCEDURE — 3600000004 HC SURGERY LEVEL 4 BASE: Performed by: ORTHOPAEDIC SURGERY

## 2023-11-15 PROCEDURE — 3700000000 HC ANESTHESIA ATTENDED CARE: Performed by: ORTHOPAEDIC SURGERY

## 2023-11-15 PROCEDURE — 97530 THERAPEUTIC ACTIVITIES: CPT

## 2023-11-15 PROCEDURE — 2500000003 HC RX 250 WO HCPCS: Performed by: NURSE ANESTHETIST, CERTIFIED REGISTERED

## 2023-11-15 PROCEDURE — 3700000001 HC ADD 15 MINUTES (ANESTHESIA): Performed by: ORTHOPAEDIC SURGERY

## 2023-11-15 PROCEDURE — 2709999900 HC NON-CHARGEABLE SUPPLY: Performed by: ORTHOPAEDIC SURGERY

## 2023-11-15 PROCEDURE — 7100000000 HC PACU RECOVERY - FIRST 15 MIN: Performed by: ORTHOPAEDIC SURGERY

## 2023-11-15 PROCEDURE — 7100000011 HC PHASE II RECOVERY - ADDTL 15 MIN: Performed by: ORTHOPAEDIC SURGERY

## 2023-11-15 PROCEDURE — 6360000002 HC RX W HCPCS: Performed by: ORTHOPAEDIC SURGERY

## 2023-11-15 PROCEDURE — 6370000000 HC RX 637 (ALT 250 FOR IP): Performed by: ANESTHESIOLOGY

## 2023-11-15 PROCEDURE — 2580000003 HC RX 258: Performed by: ANESTHESIOLOGY

## 2023-11-15 PROCEDURE — 6360000002 HC RX W HCPCS: Performed by: ANESTHESIOLOGY

## 2023-11-15 PROCEDURE — 6360000002 HC RX W HCPCS: Performed by: NURSE ANESTHETIST, CERTIFIED REGISTERED

## 2023-11-15 PROCEDURE — 86901 BLOOD TYPING SEROLOGIC RH(D): CPT

## 2023-11-15 PROCEDURE — 7100000010 HC PHASE II RECOVERY - FIRST 15 MIN: Performed by: ORTHOPAEDIC SURGERY

## 2023-11-15 PROCEDURE — 86900 BLOOD TYPING SEROLOGIC ABO: CPT

## 2023-11-15 PROCEDURE — 2580000003 HC RX 258: Performed by: PHYSICIAN ASSISTANT

## 2023-11-15 PROCEDURE — 97535 SELF CARE MNGMENT TRAINING: CPT

## 2023-11-15 PROCEDURE — 2720000010 HC SURG SUPPLY STERILE: Performed by: ORTHOPAEDIC SURGERY

## 2023-11-15 PROCEDURE — 6360000002 HC RX W HCPCS: Performed by: PHYSICIAN ASSISTANT

## 2023-11-15 PROCEDURE — 7100000001 HC PACU RECOVERY - ADDTL 15 MIN: Performed by: ORTHOPAEDIC SURGERY

## 2023-11-15 PROCEDURE — 2500000003 HC RX 250 WO HCPCS: Performed by: PHYSICIAN ASSISTANT

## 2023-11-15 PROCEDURE — 86850 RBC ANTIBODY SCREEN: CPT

## 2023-11-15 PROCEDURE — 73560 X-RAY EXAM OF KNEE 1 OR 2: CPT

## 2023-11-15 PROCEDURE — 3600000014 HC SURGERY LEVEL 4 ADDTL 15MIN: Performed by: ORTHOPAEDIC SURGERY

## 2023-11-15 PROCEDURE — 97165 OT EVAL LOW COMPLEX 30 MIN: CPT

## 2023-11-15 PROCEDURE — 97161 PT EVAL LOW COMPLEX 20 MIN: CPT

## 2023-11-15 PROCEDURE — C1776 JOINT DEVICE (IMPLANTABLE): HCPCS | Performed by: ORTHOPAEDIC SURGERY

## 2023-11-15 PROCEDURE — 64447 NJX AA&/STRD FEMORAL NRV IMG: CPT | Performed by: ANESTHESIOLOGY

## 2023-11-15 PROCEDURE — 97116 GAIT TRAINING THERAPY: CPT

## 2023-11-15 DEVICE — KNEE K2 TOT HEMI ADV CMTLS IMPL CAPPED K2 ZIM: Type: IMPLANTABLE DEVICE | Site: KNEE | Status: FUNCTIONAL

## 2023-11-15 DEVICE — COMPONENT FEM CR NAR 9 LT KNEE CEM COCR STRL PERSONA LTX: Type: IMPLANTABLE DEVICE | Site: KNEE | Status: FUNCTIONAL

## 2023-11-15 DEVICE — BASEPLATE TIB KEELED 0 DEG E LT KNEE SPIKE OSSEOTI PERSONA: Type: IMPLANTABLE DEVICE | Site: KNEE | Status: FUNCTIONAL

## 2023-11-15 DEVICE — PSN MC VE ASF L 12MM 8-11 EF: Type: IMPLANTABLE DEVICE | Site: KNEE | Status: FUNCTIONAL

## 2023-11-15 RX ORDER — ONDANSETRON 2 MG/ML
4 INJECTION INTRAMUSCULAR; INTRAVENOUS EVERY 6 HOURS PRN
Status: CANCELLED | OUTPATIENT
Start: 2023-11-15

## 2023-11-15 RX ORDER — ONDANSETRON 2 MG/ML
INJECTION INTRAMUSCULAR; INTRAVENOUS PRN
Status: DISCONTINUED | OUTPATIENT
Start: 2023-11-15 | End: 2023-11-15 | Stop reason: SDUPTHER

## 2023-11-15 RX ORDER — ONDANSETRON 2 MG/ML
4 INJECTION INTRAMUSCULAR; INTRAVENOUS
Status: DISCONTINUED | OUTPATIENT
Start: 2023-11-15 | End: 2023-11-15 | Stop reason: HOSPADM

## 2023-11-15 RX ORDER — METHYLPREDNISOLONE 4 MG/1
TABLET ORAL
Qty: 1 KIT | Refills: 0 | Status: SHIPPED | OUTPATIENT
Start: 2023-11-15

## 2023-11-15 RX ORDER — VANCOMYCIN HYDROCHLORIDE 1 G/20ML
INJECTION, POWDER, LYOPHILIZED, FOR SOLUTION INTRAVENOUS PRN
Status: DISCONTINUED | OUTPATIENT
Start: 2023-11-15 | End: 2023-11-15 | Stop reason: HOSPADM

## 2023-11-15 RX ORDER — MORPHINE SULFATE 4 MG/ML
4 INJECTION INTRAVENOUS
Status: CANCELLED | OUTPATIENT
Start: 2023-11-15

## 2023-11-15 RX ORDER — FENTANYL CITRATE 50 UG/ML
50 INJECTION, SOLUTION INTRAMUSCULAR; INTRAVENOUS EVERY 5 MIN PRN
Status: DISCONTINUED | OUTPATIENT
Start: 2023-11-15 | End: 2023-11-15 | Stop reason: HOSPADM

## 2023-11-15 RX ORDER — SENNOSIDES A AND B 8.6 MG/1
1 TABLET, FILM COATED ORAL DAILY PRN
Status: CANCELLED | OUTPATIENT
Start: 2023-11-15

## 2023-11-15 RX ORDER — MIDAZOLAM HYDROCHLORIDE 1 MG/ML
INJECTION INTRAMUSCULAR; INTRAVENOUS
Status: COMPLETED
Start: 2023-11-15 | End: 2023-11-15

## 2023-11-15 RX ORDER — OXYCODONE HYDROCHLORIDE 5 MG/1
10 TABLET ORAL EVERY 4 HOURS PRN
Status: CANCELLED | OUTPATIENT
Start: 2023-11-15

## 2023-11-15 RX ORDER — INSULIN LISPRO 100 [IU]/ML
0.08 INJECTION, SOLUTION INTRAVENOUS; SUBCUTANEOUS
Status: DISCONTINUED | OUTPATIENT
Start: 2023-11-15 | End: 2023-11-15 | Stop reason: HOSPADM

## 2023-11-15 RX ORDER — INSULIN GLARGINE 100 [IU]/ML
0.25 INJECTION, SOLUTION SUBCUTANEOUS NIGHTLY
Status: DISCONTINUED | OUTPATIENT
Start: 2023-11-15 | End: 2023-11-15 | Stop reason: HOSPADM

## 2023-11-15 RX ORDER — DEXTROSE MONOHYDRATE 100 MG/ML
INJECTION, SOLUTION INTRAVENOUS CONTINUOUS PRN
Status: DISCONTINUED | OUTPATIENT
Start: 2023-11-15 | End: 2023-11-15 | Stop reason: HOSPADM

## 2023-11-15 RX ORDER — OXYCODONE HYDROCHLORIDE 5 MG/1
5 TABLET ORAL EVERY 4 HOURS PRN
Status: CANCELLED | OUTPATIENT
Start: 2023-11-15

## 2023-11-15 RX ORDER — MORPHINE SULFATE 4 MG/ML
2 INJECTION INTRAVENOUS
Status: CANCELLED | OUTPATIENT
Start: 2023-11-15

## 2023-11-15 RX ORDER — SODIUM CHLORIDE 9 MG/ML
INJECTION, SOLUTION INTRAVENOUS PRN
Status: DISCONTINUED | OUTPATIENT
Start: 2023-11-15 | End: 2023-11-15 | Stop reason: HOSPADM

## 2023-11-15 RX ORDER — SODIUM CHLORIDE 0.9 % (FLUSH) 0.9 %
5-40 SYRINGE (ML) INJECTION EVERY 12 HOURS SCHEDULED
Status: DISCONTINUED | OUTPATIENT
Start: 2023-11-15 | End: 2023-11-15 | Stop reason: HOSPADM

## 2023-11-15 RX ORDER — SODIUM CHLORIDE 0.9 % (FLUSH) 0.9 %
5-40 SYRINGE (ML) INJECTION PRN
Status: DISCONTINUED | OUTPATIENT
Start: 2023-11-15 | End: 2023-11-15 | Stop reason: HOSPADM

## 2023-11-15 RX ORDER — SCOLOPAMINE TRANSDERMAL SYSTEM 1 MG/1
1 PATCH, EXTENDED RELEASE TRANSDERMAL
Status: DISCONTINUED | OUTPATIENT
Start: 2023-11-15 | End: 2023-11-15 | Stop reason: HOSPADM

## 2023-11-15 RX ORDER — FENTANYL CITRATE 50 UG/ML
INJECTION, SOLUTION INTRAMUSCULAR; INTRAVENOUS
Status: COMPLETED
Start: 2023-11-15 | End: 2023-11-15

## 2023-11-15 RX ORDER — ASPIRIN 81 MG/1
81 TABLET, CHEWABLE ORAL 2 TIMES DAILY
Qty: 60 TABLET | Refills: 0 | Status: SHIPPED | OUTPATIENT
Start: 2023-11-16

## 2023-11-15 RX ORDER — FENTANYL CITRATE 50 UG/ML
25 INJECTION, SOLUTION INTRAMUSCULAR; INTRAVENOUS EVERY 5 MIN PRN
Status: DISCONTINUED | OUTPATIENT
Start: 2023-11-15 | End: 2023-11-15 | Stop reason: HOSPADM

## 2023-11-15 RX ORDER — SODIUM CHLORIDE 9 MG/ML
INJECTION, SOLUTION INTRAVENOUS PRN
Status: CANCELLED | OUTPATIENT
Start: 2023-11-15

## 2023-11-15 RX ORDER — LIDOCAINE HYDROCHLORIDE 20 MG/ML
INJECTION, SOLUTION INTRAVENOUS PRN
Status: DISCONTINUED | OUTPATIENT
Start: 2023-11-15 | End: 2023-11-15 | Stop reason: SDUPTHER

## 2023-11-15 RX ORDER — POLYETHYLENE GLYCOL 3350 17 G/17G
17 POWDER, FOR SOLUTION ORAL DAILY
Status: CANCELLED | OUTPATIENT
Start: 2023-11-15

## 2023-11-15 RX ORDER — SODIUM CHLORIDE 0.9 % (FLUSH) 0.9 %
5-40 SYRINGE (ML) INJECTION EVERY 12 HOURS SCHEDULED
Status: CANCELLED | OUTPATIENT
Start: 2023-11-15

## 2023-11-15 RX ORDER — DEXAMETHASONE SODIUM PHOSPHATE 4 MG/ML
INJECTION, SOLUTION INTRA-ARTICULAR; INTRALESIONAL; INTRAMUSCULAR; INTRAVENOUS; SOFT TISSUE PRN
Status: DISCONTINUED | OUTPATIENT
Start: 2023-11-15 | End: 2023-11-15 | Stop reason: SDUPTHER

## 2023-11-15 RX ORDER — ONDANSETRON 4 MG/1
4 TABLET, FILM COATED ORAL 3 TIMES DAILY PRN
Qty: 15 TABLET | Refills: 0 | Status: SHIPPED | OUTPATIENT
Start: 2023-11-15

## 2023-11-15 RX ORDER — INSULIN LISPRO 100 [IU]/ML
0-4 INJECTION, SOLUTION INTRAVENOUS; SUBCUTANEOUS NIGHTLY
Status: DISCONTINUED | OUTPATIENT
Start: 2023-11-15 | End: 2023-11-15 | Stop reason: HOSPADM

## 2023-11-15 RX ORDER — PROCHLORPERAZINE EDISYLATE 5 MG/ML
5 INJECTION INTRAMUSCULAR; INTRAVENOUS
Status: DISCONTINUED | OUTPATIENT
Start: 2023-11-15 | End: 2023-11-15 | Stop reason: HOSPADM

## 2023-11-15 RX ORDER — MIDAZOLAM HYDROCHLORIDE 1 MG/ML
INJECTION INTRAMUSCULAR; INTRAVENOUS PRN
Status: DISCONTINUED | OUTPATIENT
Start: 2023-11-15 | End: 2023-11-15 | Stop reason: SDUPTHER

## 2023-11-15 RX ORDER — PROPOFOL 10 MG/ML
INJECTION, EMULSION INTRAVENOUS PRN
Status: DISCONTINUED | OUTPATIENT
Start: 2023-11-15 | End: 2023-11-15 | Stop reason: SDUPTHER

## 2023-11-15 RX ORDER — APREPITANT 40 MG/1
40 CAPSULE ORAL ONCE
Status: COMPLETED | OUTPATIENT
Start: 2023-11-15 | End: 2023-11-15

## 2023-11-15 RX ORDER — SODIUM CHLORIDE 0.9 % (FLUSH) 0.9 %
5-40 SYRINGE (ML) INJECTION PRN
Status: CANCELLED | OUTPATIENT
Start: 2023-11-15

## 2023-11-15 RX ORDER — CYCLOBENZAPRINE HCL 10 MG
10 TABLET ORAL 3 TIMES DAILY PRN
Qty: 30 TABLET | Refills: 0 | Status: SHIPPED | OUTPATIENT
Start: 2023-11-15 | End: 2023-11-25

## 2023-11-15 RX ORDER — FENTANYL CITRATE 50 UG/ML
INJECTION, SOLUTION INTRAMUSCULAR; INTRAVENOUS PRN
Status: DISCONTINUED | OUTPATIENT
Start: 2023-11-15 | End: 2023-11-15 | Stop reason: SDUPTHER

## 2023-11-15 RX ORDER — ROPIVACAINE HYDROCHLORIDE 5 MG/ML
INJECTION, SOLUTION EPIDURAL; INFILTRATION; PERINEURAL
Status: COMPLETED | OUTPATIENT
Start: 2023-11-15 | End: 2023-11-15

## 2023-11-15 RX ORDER — SODIUM CHLORIDE, SODIUM LACTATE, POTASSIUM CHLORIDE, CALCIUM CHLORIDE 600; 310; 30; 20 MG/100ML; MG/100ML; MG/100ML; MG/100ML
INJECTION, SOLUTION INTRAVENOUS CONTINUOUS
Status: DISCONTINUED | OUTPATIENT
Start: 2023-11-15 | End: 2023-11-15 | Stop reason: HOSPADM

## 2023-11-15 RX ORDER — MELOXICAM 7.5 MG/1
7.5 TABLET ORAL DAILY
Status: CANCELLED | OUTPATIENT
Start: 2023-11-15 | End: 2023-11-18

## 2023-11-15 RX ORDER — ACETAMINOPHEN 325 MG/1
650 TABLET ORAL EVERY 6 HOURS
Status: CANCELLED | OUTPATIENT
Start: 2023-11-15

## 2023-11-15 RX ORDER — OXYCODONE HYDROCHLORIDE 5 MG/1
5 TABLET ORAL EVERY 6 HOURS PRN
Qty: 28 TABLET | Refills: 0 | Status: SHIPPED | OUTPATIENT
Start: 2023-11-15 | End: 2023-11-22

## 2023-11-15 RX ORDER — MEPERIDINE HYDROCHLORIDE 25 MG/ML
12.5 INJECTION INTRAMUSCULAR; INTRAVENOUS; SUBCUTANEOUS EVERY 5 MIN PRN
Status: DISCONTINUED | OUTPATIENT
Start: 2023-11-15 | End: 2023-11-15 | Stop reason: HOSPADM

## 2023-11-15 RX ORDER — SODIUM CHLORIDE, SODIUM LACTATE, POTASSIUM CHLORIDE, CALCIUM CHLORIDE 600; 310; 30; 20 MG/100ML; MG/100ML; MG/100ML; MG/100ML
INJECTION, SOLUTION INTRAVENOUS CONTINUOUS
Status: CANCELLED | OUTPATIENT
Start: 2023-11-15

## 2023-11-15 RX ORDER — PROPOFOL 10 MG/ML
INJECTION, EMULSION INTRAVENOUS CONTINUOUS PRN
Status: DISCONTINUED | OUTPATIENT
Start: 2023-11-15 | End: 2023-11-15 | Stop reason: SDUPTHER

## 2023-11-15 RX ORDER — CEPHALEXIN 500 MG/1
500 CAPSULE ORAL 4 TIMES DAILY
Qty: 4 CAPSULE | Refills: 0 | Status: SHIPPED | OUTPATIENT
Start: 2023-11-15

## 2023-11-15 RX ORDER — DIPHENHYDRAMINE HYDROCHLORIDE 50 MG/ML
12.5 INJECTION INTRAMUSCULAR; INTRAVENOUS
Status: DISCONTINUED | OUTPATIENT
Start: 2023-11-15 | End: 2023-11-15 | Stop reason: HOSPADM

## 2023-11-15 RX ORDER — INSULIN LISPRO 100 [IU]/ML
0-4 INJECTION, SOLUTION INTRAVENOUS; SUBCUTANEOUS
Status: DISCONTINUED | OUTPATIENT
Start: 2023-11-15 | End: 2023-11-15 | Stop reason: HOSPADM

## 2023-11-15 RX ORDER — ONDANSETRON 4 MG/1
4 TABLET, ORALLY DISINTEGRATING ORAL EVERY 8 HOURS PRN
Status: CANCELLED | OUTPATIENT
Start: 2023-11-15

## 2023-11-15 RX ORDER — ROCURONIUM BROMIDE 10 MG/ML
INJECTION, SOLUTION INTRAVENOUS PRN
Status: DISCONTINUED | OUTPATIENT
Start: 2023-11-15 | End: 2023-11-15 | Stop reason: SDUPTHER

## 2023-11-15 RX ORDER — MELOXICAM 15 MG/1
15 TABLET ORAL DAILY
Qty: 30 TABLET | Refills: 0 | Status: SHIPPED | OUTPATIENT
Start: 2023-11-15

## 2023-11-15 RX ORDER — EPHEDRINE SULFATE 50 MG/ML
INJECTION INTRAVENOUS PRN
Status: DISCONTINUED | OUTPATIENT
Start: 2023-11-15 | End: 2023-11-15 | Stop reason: SDUPTHER

## 2023-11-15 RX ADMIN — PROPOFOL 30 MG: 10 INJECTION, EMULSION INTRAVENOUS at 12:55

## 2023-11-15 RX ADMIN — EPHEDRINE SULFATE 10 MG: 50 INJECTION INTRAVENOUS at 14:33

## 2023-11-15 RX ADMIN — DEXAMETHASONE SODIUM PHOSPHATE 4 MG: 4 INJECTION, SOLUTION INTRAMUSCULAR; INTRAVENOUS at 13:09

## 2023-11-15 RX ADMIN — ROCURONIUM BROMIDE 20 MG: 10 INJECTION, SOLUTION INTRAVENOUS at 13:17

## 2023-11-15 RX ADMIN — FENTANYL CITRATE 50 MCG: 50 INJECTION, SOLUTION INTRAMUSCULAR; INTRAVENOUS at 15:10

## 2023-11-15 RX ADMIN — SODIUM CHLORIDE, POTASSIUM CHLORIDE, SODIUM LACTATE AND CALCIUM CHLORIDE: 600; 310; 30; 20 INJECTION, SOLUTION INTRAVENOUS at 13:43

## 2023-11-15 RX ADMIN — PHENYLEPHRINE HYDROCHLORIDE 100 MCG: 10 INJECTION, SOLUTION INTRAMUSCULAR; INTRAVENOUS; SUBCUTANEOUS at 14:40

## 2023-11-15 RX ADMIN — PHENYLEPHRINE HYDROCHLORIDE 100 MCG: 10 INJECTION, SOLUTION INTRAMUSCULAR; INTRAVENOUS; SUBCUTANEOUS at 13:21

## 2023-11-15 RX ADMIN — EPHEDRINE SULFATE 10 MG: 50 INJECTION INTRAVENOUS at 13:37

## 2023-11-15 RX ADMIN — TRANEXAMIC ACID 1000 MG: 100 INJECTION, SOLUTION INTRAVENOUS at 14:17

## 2023-11-15 RX ADMIN — PHENYLEPHRINE HYDROCHLORIDE 100 MCG: 10 INJECTION, SOLUTION INTRAMUSCULAR; INTRAVENOUS; SUBCUTANEOUS at 13:52

## 2023-11-15 RX ADMIN — SUGAMMADEX 400 MG: 100 INJECTION, SOLUTION INTRAVENOUS at 15:06

## 2023-11-15 RX ADMIN — SODIUM CHLORIDE 2000 MG: 900 INJECTION INTRAVENOUS at 13:04

## 2023-11-15 RX ADMIN — ONDANSETRON 4 MG: 2 INJECTION INTRAMUSCULAR; INTRAVENOUS at 12:54

## 2023-11-15 RX ADMIN — EPHEDRINE SULFATE 10 MG: 50 INJECTION INTRAVENOUS at 14:20

## 2023-11-15 RX ADMIN — LIDOCAINE HYDROCHLORIDE 100 MG: 20 INJECTION, SOLUTION INTRAVENOUS at 12:54

## 2023-11-15 RX ADMIN — APREPITANT 40 MG: 40 CAPSULE ORAL at 10:14

## 2023-11-15 RX ADMIN — EPHEDRINE SULFATE 10 MG: 50 INJECTION INTRAVENOUS at 14:44

## 2023-11-15 RX ADMIN — ROCURONIUM BROMIDE 50 MG: 10 INJECTION, SOLUTION INTRAVENOUS at 12:56

## 2023-11-15 RX ADMIN — FENTANYL CITRATE 100 MCG: 50 INJECTION, SOLUTION INTRAMUSCULAR; INTRAVENOUS at 12:10

## 2023-11-15 RX ADMIN — ROPIVACAINE HYDROCHLORIDE 25 ML: 5 INJECTION, SOLUTION EPIDURAL; INFILTRATION; PERINEURAL at 12:11

## 2023-11-15 RX ADMIN — PROPOFOL 100 MG: 10 INJECTION, EMULSION INTRAVENOUS at 12:54

## 2023-11-15 RX ADMIN — MIDAZOLAM HYDROCHLORIDE 2 MG: 2 INJECTION, SOLUTION INTRAMUSCULAR; INTRAVENOUS at 12:47

## 2023-11-15 RX ADMIN — PHENYLEPHRINE HYDROCHLORIDE 100 MCG: 10 INJECTION, SOLUTION INTRAMUSCULAR; INTRAVENOUS; SUBCUTANEOUS at 13:31

## 2023-11-15 RX ADMIN — SODIUM CHLORIDE, POTASSIUM CHLORIDE, SODIUM LACTATE AND CALCIUM CHLORIDE: 600; 310; 30; 20 INJECTION, SOLUTION INTRAVENOUS at 10:36

## 2023-11-15 RX ADMIN — EPHEDRINE SULFATE 10 MG: 50 INJECTION INTRAVENOUS at 14:53

## 2023-11-15 RX ADMIN — PHENYLEPHRINE HYDROCHLORIDE 100 MCG: 10 INJECTION, SOLUTION INTRAMUSCULAR; INTRAVENOUS; SUBCUTANEOUS at 13:39

## 2023-11-15 RX ADMIN — PROPOFOL 25 MCG/KG/MIN: 10 INJECTION, EMULSION INTRAVENOUS at 13:15

## 2023-11-15 RX ADMIN — FENTANYL CITRATE 50 MCG: 50 INJECTION, SOLUTION INTRAMUSCULAR; INTRAVENOUS at 12:54

## 2023-11-15 RX ADMIN — MIDAZOLAM HYDROCHLORIDE 2 MG: 2 INJECTION, SOLUTION INTRAMUSCULAR; INTRAVENOUS at 12:10

## 2023-11-15 RX ADMIN — PHENYLEPHRINE HYDROCHLORIDE 100 MCG: 10 INJECTION, SOLUTION INTRAMUSCULAR; INTRAVENOUS; SUBCUTANEOUS at 14:19

## 2023-11-15 RX ADMIN — PHENYLEPHRINE HYDROCHLORIDE 100 MCG: 10 INJECTION, SOLUTION INTRAMUSCULAR; INTRAVENOUS; SUBCUTANEOUS at 13:11

## 2023-11-15 RX ADMIN — TRANEXAMIC ACID 1000 MG: 100 INJECTION, SOLUTION INTRAVENOUS at 13:06

## 2023-11-15 ASSESSMENT — PAIN - FUNCTIONAL ASSESSMENT: PAIN_FUNCTIONAL_ASSESSMENT: 0-10

## 2023-11-15 ASSESSMENT — PAIN DESCRIPTION - ORIENTATION: ORIENTATION: LEFT

## 2023-11-15 ASSESSMENT — PAIN DESCRIPTION - LOCATION: LOCATION: KNEE

## 2023-11-15 ASSESSMENT — PAIN DESCRIPTION - PAIN TYPE: TYPE: SURGICAL PAIN

## 2023-11-15 ASSESSMENT — PAIN SCALES - GENERAL: PAINLEVEL_OUTOF10: 2

## 2023-11-15 ASSESSMENT — PAIN DESCRIPTION - FREQUENCY: FREQUENCY: CONTINUOUS

## 2023-11-15 ASSESSMENT — PAIN DESCRIPTION - DESCRIPTORS: DESCRIPTORS: ACHING;SORE

## 2023-11-15 NOTE — ANESTHESIA PROCEDURE NOTES
Peripheral Block    Patient location during procedure: pre-op  Reason for block: post-op pain management and at surgeon's request  Start time: 11/15/2023 12:11 PM  End time: 11/15/2023 12:17 PM  Staffing  Performed: anesthesiologist   Anesthesiologist: Isac Olson MD  Performed by: Isac Olson MD  Authorized by: Isac Olson MD    Preanesthetic Checklist  Completed: patient identified, IV checked, site marked, risks and benefits discussed, surgical/procedural consents, equipment checked, pre-op evaluation, timeout performed, anesthesia consent given, oxygen available and monitors applied/VS acknowledged  Peripheral Block   Patient position: supine  Prep: ChloraPrep  Provider prep: mask and sterile gloves  Patient monitoring: cardiac monitor, continuous pulse ox, frequent blood pressure checks and IV access  Block type: Femoral  Adductor canal (Low Femoral)  Laterality: left  Injection technique: single-shot  Guidance: ultrasound guided  Local infiltration: lidocaine  Infiltration strength: 1 %  Local infiltration: lidocaine  Dose: 3 mL    Needle   Needle type: insulated echogenic nerve stimulator needle   Needle gauge: 21 G  Needle localization: ultrasound guidance  Needle length: 10 cm  Assessment   Injection assessment: negative aspiration for heme, no paresthesia on injection and local visualized surrounding nerve on ultrasound  Paresthesia pain: none  Slow fractionated injection: yes  Hemodynamics: stable    Additional Notes  Immediately prior to procedure a \"time out\" was called to verify the correct patient, allergies, laterality, procedure and equipment. Time out performed with  RN    Local Anesthetic: 0.5 %  ropivacaine   Amount: 25 ml  in 5 ml increments after negative aspiration each time.     Iliopsoas Muscle and Fascia Iliaca, Femoral artery (Deep artery to the thigh take off), Femoral Vein and Femoral Nerve are identified; the tip of the need and the spread of the local anesthetic around

## 2023-11-15 NOTE — ANESTHESIA POSTPROCEDURE EVALUATION
Department of Anesthesiology  Postprocedure Note    Patient: Mary Brown  MRN: [id number]  YOB: 1965  Date of evaluation: 11/15/2023      Procedure Summary       Date: 11/15/23 Room / Location: [facility] 09 / The [address] [facility]    Anesthesia Start: 1250 Anesthesia Stop: 8187    Procedure: LEFT KNEE TOTAL KNEE ARTHROPLASTY ROBOTIC (Left: Knee) Diagnosis:       Primary osteoarthritis of left knee      (Primary osteoarthritis of left knee [M17.12])    Surgeons: Jag Vaughn MD Responsible Provider: Kinza Adams MD    Anesthesia Type: general ASA Status: 3            Anesthesia Type: No value filed.     Hortensia Phase I: Hortensia Score: 7    Hortensia Phase II:        Anesthesia Post Evaluation    Patient location during evaluation: PACU  Patient participation: complete - patient participated  Level of consciousness: awake and alert  Airway patency: patent  Nausea & Vomiting: no nausea and no vomiting  Complications: no  Cardiovascular status: hemodynamically stable  Respiratory status: acceptable  Hydration status: euvolemic  Multimodal analgesia pain management approach  Pain management: satisfactory to patient

## 2023-11-15 NOTE — ANESTHESIA PRE PROCEDURE
Department of Anesthesiology  Preprocedure Note       Name:  Mary Gillespie   Age:  62 y.o.  :  1965                                          MRN:  [id number]         Date:  11/15/2023      Surgeon: Janessa Chavez):  Reynold Holstein, MD    Procedure: Procedure(s):  LEFT KNEE TOTAL KNEE ARTHROPLASTY ROBOTIC    Medications prior to admission:   Prior to Admission medications    Medication Sig Start Date End Date Taking? Authorizing Provider   mupirocin (BACTROBAN) 2 % ointment Apply twice daily to each nare for 5 days prior to surgical procedure 23   Parris Sam PA-C   pramipexole (MIRAPEX) 1.5 MG tablet Take 1 tablet by mouth every evening 10/26/23 11/25/23  Inez Gil MD   rosuvastatin (CRESTOR) 10 MG tablet Take 1 tablet by mouth nightly 10/26/23   Inez Gil MD   celecoxib (CELEBREX) 200 MG capsule Take 1 capsule by mouth daily 23   Inez Gil MD   venlafaxine (EFFEXOR XR) 75 MG extended release capsule TAKE 1 CAPSULE BY MOUTH ONE TIME A DAY  Patient taking differently: Take 1 capsule by mouth nightly TAKE 1 CAPSULE BY MOUTH ONE TIME A DAY 23   Glenda Pascal MD   cyclobenzaprine (FLEXERIL) 10 MG tablet Take 1 tablet by mouth 2 times daily as needed for Muscle spasms 22   LUKASZ Ramirez   cetirizine (ZYRTEC) 10 MG tablet Take 1 tablet by mouth nightly    Provider, MD Yoli       Current medications:    No current facility-administered medications for this encounter. Allergies:  No Known Allergies    Problem List:    Patient Active Problem List   Diagnosis Code    Depression F32. A    Hypercholesteremia E78.00    Lumbago M54.50    Obstructive sleep apnea G47.33    Tobacco abuse Z72.0    Rosacea L71.9    Periorificial dermatitis L71.0    Breast cancer, right breast (HCC) C50.911    RLS (restless legs syndrome) G25.81    Obesity (BMI 30.0-34. 9) E66.9    Major depressive disorder, recurrent, moderate F33.1    Primary osteoarthritis of left knee M17.12       Past Medical

## 2023-11-15 NOTE — OP NOTE
using robotic-arm assisted surgery:     Antibiotics 2 g of Ancef were given. MRSA swab testing was performed preop, and no additional antibiotics were required. Tranexamic acid 1 g was given at start. Tourniquet was not used throughout the case. We began with an anterior approach to the knee. Thick soft tissue flaps were developed. Medial parapatellar arthrotomy was extended through the fascial tissue. Underlying synovial fluid was evacuated. Small synovectomy was performed just superior to the distal femoral cartilage. Part of the infrapatellar fat pad was removed for exposure. A controlled lateral release just lateral to the patella bone was created for exposure. Patella was then everted and the knee flexed. 2 Hohmann type retractors were then placed to deliver the distal femur. We exposed the distal part of the femur. I use electro Bovie cautery to bautista out the center point based on AP and lateral x-rays. I then performed registration of the robot. The series of steps were completed for the [facility] protocol including femoral head center, alignment of the overall limb, as well as bony surface landmarks within the knee. I also performed balancing of the knee prior to making any resections, and placed appropriate stresses and retractors and varus and valgus to extension and flexion of the knee. Overall balancing state was assessed. The robot rendition was then created and the overall plan was created. The appropriate degrees and depth of resection and were taken for both the proximal tibia and the distal femur. The distal femur was set to 2 degrees valgus relative to the hip center. The proximal tibia was set to 1 varus degrees relative to the ankle and tibial axis. The rotation of the femoral component was set to 2 degrees. Once happy with the plan, I then enacted the resections. I brought the robotic arm into position for the distal femoral resection.   2 pins were placed to hold the

## 2023-11-15 NOTE — DISCHARGE INSTRUCTIONS
with soap and water or an alcohol-based hand rub before and after caring for each patient. May remove some of your hair immediately before your surgery using electric clippers if the hair is in the same area where the procedure will occur. They should not shave you with a razor. Wear special hair covers, masks, gowns, and gloves during surgery to keep the surgery area clean. Give you antibiotics before your surgery starts. In most cases, you should get antibiotics within 60 minutes before the surgery starts and the antibiotics should be stopped within 24 hours after surgery. Clean the skin at the site of your surgery with a special soap that kills germs. What can I do to help prevent SSIs? Before you surgery:  Tell your doctor about other medical problems you may have. Health problems such as allergies, diabetes, and obesity could affect your surgery and your treatment. Quit smoking. Patients who smoke get more infections. Talk to your doctor about how you can quit before your surgery. Do not shave near where you will have surgery. Shaving with a razor can irritate your skin and make it easier to develop an infection. At the time of your surgery:  Speak up if someone tries to shave you with a razor before surgery. Ask why you need to be shaved and talk with your surgeon if you have any concerns. Ask if you will get antibiotics before surgery. After your surgery:  Make sure that your healthcare providers clean their hands before examining you, either with soap and water or an alcohol-based hand rub. IF YOU DO NOT SEE YOUR PROVIDERS CLEAN THEIR HANDS, PLEASE ASK THEM TO DO SO. Family and friends who visit you should not touch the surgical wound or dressings. Family and friends should clean their hands with soap and water or an alcohol-based hand rub before and after visiting you. If you do not see them clean their hands, ask them to clean their hands.      What do I need to do

## 2023-11-15 NOTE — PROGRESS NOTES
Physical Therapy  Facility/Department: [address] [facility]  Physical Therapy Initial Assessment/Treatment    Name: Kwame Alvarez  : 1965  MRN: [id number]  Date of Service: 11/15/2023    Discharge Recommendations:  Kwame Alvarez scored a 20/24 on the AM-PAC short mobility form. Current research shows that an AM-PAC score of 18 or greater is typically associated with a discharge to the patient's home setting. Based on the patient's AM-PAC score and their current functional mobility deficits, it is recommended that the patient have 2-3 sessions per week of Physical Therapy at d/c to increase the patient's independence. At this time, this patient demonstrates the endurance and safety to discharge home with outpt PTand a follow up treatment frequency of 2-3x/wk. Please see assessment section for further patient specific details. If patient discharges prior to next session this note will serve as a discharge summary. Please see below for the latest assessment towards goals. 24 hour supervision or assist, Outpatient PT   PT Equipment Recommendations  Equipment Needed: No      Patient Diagnosis(es): The encounter diagnosis was Primary osteoarthritis of left knee. Past Medical History:  has a past medical history of ADHD (attention deficit hyperactivity disorder), Allergic rhinitis, Anxiety, Back pain, Breast cancer ([address]), Breast Cancer - right breast ([address]), History of therapeutic radiation, Hypercholesteremia, Obstructive sleep apnea (adult) (pediatric), TANYA on CPAP, Osteoarthritis, PONV (postoperative nausea and vomiting), Restless leg, and Sleep apnea. Past Surgical History:  has a past surgical history that includes Tonsillectomy (); Cholecystectomy (); Achilles tendon surgery (Right); Colonoscopy (N/A, 2020); Breast reduction surgery (10/29/2020); Breast reduction surgery (Bilateral, 10/29/2020); Breast biopsy (Right, 2021); Hysterectomy ();  Knee arthroscopy (Left,

## 2023-11-15 NOTE — PERIOP NOTE
Left AC nerve blocked by Dr Meryl Larson.   Patient was pre-medicated by Dr Meryl Larson and slept through procedure, easily aroused

## 2023-11-15 NOTE — H&P
Update History & Physical     The patient's History and Physical of 10/26/2023 was reviewed with the patient and I examined the patient. There was no change. The surgical site was confirmed by the patient and me. Plan: The risks, benefits, expected outcome, and alternative to the recommended procedure have been discussed with the patient / family. Patient understands and wants to proceed with the procedure.       Electronically signed by Derek Alaniz MD on 11/15/2023 at 12:16 PM

## 2023-11-15 NOTE — PROGRESS NOTES
LEFT Liliane Paiz    Current Allergies: Patient has no known allergies. Recent Labs     11/15/23  1041   POCGLU 93       Admitted to PACU bed 13 from OR. Arrived on a stretcher. Patient to be discharged  to home. Attached to PACU monitoring system. Alarms and parameters set. Report received from anesthesia personnel. Adelene Ports  OR staff did not report skin issues that were observed while in OR, or if admitted with skin issue. No problems reported intraoperatively. Pt arrived with oxygen per  simple mask  with oxygen at 4 liters. Oral airway in plac, removed before CRNA left bedside    Athrombic wraps and anthony hose on the non operative leg, ace wrap on the surgical leg over the ace wrap, ice applied    X--ray ordered. Doctors aware of all labs and diagnostics before coming to recovery. Parisa Hernandez called into the office asking what the plans are for her Lithium? I informed her that our office is waiting on the last Lithium Level being resulted prior to proceeding; she understood and said that she is on 8B (ph. [phone] ext. [phone]).

## 2023-11-15 NOTE — PROGRESS NOTES
Occupational Therapy  Facility/Department: [address] [facility]  Occupational Therapy Initial Assessment - Treatment and Discharge     Name: Sedrick Gregorio  : 1965  MRN: [id number]  Date of Service: 11/15/2023    Discharge Recommendations:   Sedrick Gregorio scored a 22/24 on the AM-PAC ADL Inpatient form. At this time, no further OT is recommended upon discharge due to pt discharging home with assist from sistr . Recommend patient returns to prior setting with prior services. Patient Diagnosis(es): The encounter diagnosis was Primary osteoarthritis of left knee. Past Medical History:  has a past medical history of ADHD (attention deficit hyperactivity disorder), Allergic rhinitis, Anxiety, Back pain, Breast cancer ([address]), Breast Cancer - right breast ([address]), History of therapeutic radiation, Hypercholesteremia, Obstructive sleep apnea (adult) (pediatric), TANYA on CPAP, Osteoarthritis, PONV (postoperative nausea and vomiting), Restless leg, and Sleep apnea. Past Surgical History:  has a past surgical history that includes Tonsillectomy (); Cholecystectomy (); Achilles tendon surgery (Right); Colonoscopy (N/A, 2020); Breast reduction surgery (10/29/2020); Breast reduction surgery (Bilateral, 10/29/2020); Breast biopsy (Right, 2021); Hysterectomy (); Knee arthroscopy (Left, 2022); and Knee Arthroplasty (). Assessment      REQUIRES OT FOLLOW-UP: No  Activity Tolerance  Activity Tolerance: Patient Tolerated treatment well  Activity Tolerance Comments: tolerated eval and treatment        Plan         Restrictions  Position Activity Restriction  Other position/activity restrictions: WBAT    Subjective   General  Chart Reviewed: Yes, Progress Notes, Orders  Patient assessed for rehabilitation services?: Yes  Family / Caregiver Present: No  Referring Practitioner: Chester Childress  Diagnosis: end stage OA s/p (L) TKE POD #0  pt reports 2/10 pain left knee.  RN

## 2023-11-16 ENCOUNTER — TELEPHONE (OUTPATIENT)
Dept: ORTHOPEDIC SURGERY | Age: 58
End: 2023-11-16

## 2023-11-16 NOTE — TELEPHONE ENCOUNTER
Spoke with patient    Incision status: No drainage or redness    Edema/Swelling/Teds: She is having some slight swelling in her left leg. She is using her ice machine, and elevating. She is wearing EDNA hose on her contralateral leg. Pain level and status: Her block is starting to wear off at this point. She rates her pain at 4/10. Use of pain medications: Oxycodone 5 mg q6h prn. She started her Medrol pack today, and will start Mobic once she has completed her steroids. We discussed taking the Flexeril if needed. She is aware she can take OTC Tylenol in between doses of pain medication if needed. Blood thinner: Aspirin 81 mg BID    Bowels: No BM since surgery. She is going to start an OTC stool softener and drink plenty of fluids. She will call the office for any constipation issues. Home Care Agency active: No    Outpatient therapy: She is having her ROM tech bike delivered tomorrow. She will call today and get an appointment set up for therapy to start asap. Do you have all of your medications: Yes    Changes in medications: No    Instructed pt to call Nurse Navigator or surgeon's office with any questions or concerns.      Follow up appointments:    Future Appointments   Date Time Provider [address] [place] [address]   12/1/2023 11:15 AM Ashley Leblanc MD [facility]   2/7/2024  8:40 AM TASH Forbes SLEEP MED [facility]

## 2023-11-16 NOTE — TELEPHONE ENCOUNTER
General Question     Subject: INCREASE DOSAGE?    Patient and /or Facility Request: Iris Gomez  Contact Number: [phone]     MEDICATION     cyclobenzaprine (FLEXERIL) 10 MG tablet    NERVE BLOCK IS WEARING OFF AND Pt IS HAVING A LOT OF MUSCLE SPASMS - CAN THEY INCREASE THE DOSAGE?

## 2023-11-17 ENCOUNTER — HOSPITAL ENCOUNTER (OUTPATIENT)
Dept: PHYSICAL THERAPY | Age: 58
Setting detail: THERAPIES SERIES
Discharge: HOME | End: 2023-11-17
Attending: ORTHOPAEDIC SURGERY
Payer: COMMERCIAL

## 2023-11-17 PROCEDURE — 97112 NEUROMUSCULAR REEDUCATION: CPT | Performed by: PHYSICAL THERAPIST

## 2023-11-17 PROCEDURE — 97110 THERAPEUTIC EXERCISES: CPT | Performed by: PHYSICAL THERAPIST

## 2023-11-17 PROCEDURE — 97164 PT RE-EVAL EST PLAN CARE: CPT | Performed by: PHYSICAL THERAPIST

## 2023-11-17 PROCEDURE — 97016 VASOPNEUMATIC DEVICE THERAPY: CPT | Performed by: PHYSICAL THERAPIST

## 2023-11-20 ENCOUNTER — HOSPITAL ENCOUNTER (OUTPATIENT)
Dept: PHYSICAL THERAPY | Age: 58
Setting detail: THERAPIES SERIES
Discharge: HOME | End: 2023-11-20
Attending: ORTHOPAEDIC SURGERY
Payer: COMMERCIAL

## 2023-11-20 PROCEDURE — 97016 VASOPNEUMATIC DEVICE THERAPY: CPT | Performed by: PHYSICAL THERAPIST

## 2023-11-20 PROCEDURE — 97112 NEUROMUSCULAR REEDUCATION: CPT | Performed by: PHYSICAL THERAPIST

## 2023-11-20 PROCEDURE — 97110 THERAPEUTIC EXERCISES: CPT | Performed by: PHYSICAL THERAPIST

## 2023-11-20 NOTE — FLOWSHEET NOTE
[facility]., [address] [facility], [address] [place] [address] office: [phone] fax: [phone]           Physical Therapy: TREATMENT/PROGRESS NOTE   Patient: Sangeetha Lynch (87 y.o. female)   Treatment Date: 2023   :  1965 MRN: [id number]   Visit #: 2 post op  2   Insurance Allowable Auth Needed   30 []Yes    [x]No    Insurance: Payor: Ange Duval / Plan: [facility] - [place] PPO / Product Type: *No Product type* /   Insurance ID: [id number] - ([address] [place] [address])  Secondary Insurance (if applicable):    Treatment Diagnosis:     ICD-10-CM    1.  Chronic pain of left knee  M25.562     G89.29          Medical Diagnosis:    Primary osteoarthritis of left knee [M17.12]       Signed         Orthopaedic Surgery  Operative Report        Patient Name:  Sangeetha Lynch  Patient :  1965  MRN: [id number]  Date: 11/15/23   Pre-operative Diagnosis:   M17.12 Primary Osteoarthritis  Post-operative Diagnosis:    Same  Procedure: LEFT  33342 Total Knee Arthroplasty  20715 Robotic assisted computer navigated surgery                  Referring Physician: Zachary Bruno MD  PCP: May Olsen MD                             Plan of care signed (Y/N): NO    Date of Patient follow up with Physician: per md     Progress Report/POC: NO  reeval post op visit   POC update due: 2023 (OR 10 visits /OR [address], whichever is less)    Precautions/Contraindications:     Preferred Language for Healthcare:   [x]English       []other:    SUBJECTIVE EXAMINATION     Patient Report/Comments: see eval     Test used Initial score  10/26/23 2023   Pain Summary VAS 0-8 1-8/10   Functional questionnaire WOMAC 42 See below   Other:                OBJECTIVE EXAMINATION     Observation:              SURGERY:     Scheduled Date: L TKA     11/15/23   Functional Testing Prehab  Date 10/26/2023 Post-op Re-Eval Date 2023 6 weeks from surgery  Date  D/C   Date        Current AD use

## 2023-11-22 ENCOUNTER — HOSPITAL ENCOUNTER (OUTPATIENT)
Dept: PHYSICAL THERAPY | Age: 58
Setting detail: THERAPIES SERIES
Discharge: HOME | End: 2023-11-22
Attending: ORTHOPAEDIC SURGERY
Payer: COMMERCIAL

## 2023-11-22 PROCEDURE — 97110 THERAPEUTIC EXERCISES: CPT | Performed by: PHYSICAL THERAPIST

## 2023-11-22 PROCEDURE — 97016 VASOPNEUMATIC DEVICE THERAPY: CPT | Performed by: PHYSICAL THERAPIST

## 2023-11-22 PROCEDURE — 97112 NEUROMUSCULAR REEDUCATION: CPT | Performed by: PHYSICAL THERAPIST

## 2023-11-22 NOTE — FLOWSHEET NOTE
[facility],[place] [address]., [address] [facility], [address] [place] [address] office: [phone] fax: [phone]           Physical Therapy: TREATMENT/PROGRESS NOTE   Patient: Kim Biswas (45 y.o. female)   Treatment Date: 2023   :  1965 MRN: [id number]   Visit #: 3 post op  3   Insurance Allowable Auth Needed   30 []Yes    [x]No    Insurance: Payor: Toribio Story / Plan: [facility] - [place] PPO / Product Type: *No Product type* /   Insurance ID: [id number] - ([address] [place] [address])  Secondary Insurance (if applicable):    Treatment Diagnosis:     ICD-10-CM    1.  Chronic pain of left knee  M25.562     G89.29          Medical Diagnosis:    Primary osteoarthritis of left knee [M17.12]       Signed         Orthopaedic Surgery  Operative Report        Patient Name:  Kim Biswas  Patient :  1965  MRN: [id number]  Date: 11/15/23   Pre-operative Diagnosis:   M17.12 Primary Osteoarthritis  Post-operative Diagnosis:    Same  Procedure: LEFT  94939 Total Knee Arthroplasty  46218 Robotic assisted computer navigated surgery                  Referring Physician: Jeannie Rueda MD  PCP: Yue Singh MD                             Plan of care signed (Y/N): YES    Date of Patient follow up with Physician: per md     Progress Report/POC: NO  reeval post op visit   POC update due: 2023 (OR 10 visits /OR [address], whichever is less)    Precautions/Contraindications:     Preferred Language for Healthcare:   [x]English       []other:    SUBJECTIVE EXAMINATION     Patient Report/Comments: see eval     Test used Initial score  10/26/23 2023   Pain Summary VAS 0-8 4/10   Functional questionnaire WOMAC 42    Other:                OBJECTIVE EXAMINATION     Observation:              SURGERY:     Scheduled Date: L TKA     11/15/23   Functional Testing Prehab  Date 10/26/2023 Post-op Re-Eval Date 2023 6 weeks from surgery  Date  D/C   Date        Current AD use None Str cane

## 2023-11-24 ENCOUNTER — HOSPITAL ENCOUNTER (OUTPATIENT)
Dept: PHYSICAL THERAPY | Age: 58
Setting detail: THERAPIES SERIES
Discharge: HOME | End: 2023-11-24
Attending: ORTHOPAEDIC SURGERY
Payer: COMMERCIAL

## 2023-11-24 PROCEDURE — 97110 THERAPEUTIC EXERCISES: CPT | Performed by: PHYSICAL THERAPIST

## 2023-11-24 PROCEDURE — 97112 NEUROMUSCULAR REEDUCATION: CPT | Performed by: PHYSICAL THERAPIST

## 2023-11-24 PROCEDURE — 97016 VASOPNEUMATIC DEVICE THERAPY: CPT | Performed by: PHYSICAL THERAPIST

## 2023-11-24 NOTE — FLOWSHEET NOTE
deficits. Status: [] Progressing: [] Met: [] Not Met: [] Adjusted    Long Term Goals: To be achieved in: 12 weeks  Disability index score of 20% or less for the [facility] to assist with return top prior level of function. Status: [] Progressing: [] Met: [] Not Met: [] Adjusted  Improve knee  AROM to 0-130 degrees or better to allow for proper joint functioning as indicated by patients functional deficits. Status: [] Progressing: [] Met: [] Not Met: [] Adjusted  Pt to improve strength to 4+/5 or better of proximal hip, posterior chain LE, quadriceps, and hamstringsto allow for proper muscle and joint use in functional mobility, ADLs and prior level of function  Status: [] Progressing: [] Met: [] Not Met: [] Adjusted  Patient will return to Usual work, housework or activities, Usual recreational activities, walking around house, putting shoes/socks on, walk 2 blocks, and up/down 1 flight of stairs without increased symptoms or restriction to work towards return to prior level of function. Status: [] Progressing: [] Met: [] Not Met: [] Adjusted  Patient will increase LE function to allow independence in all self-care activities. Patient will resume normal work/leisure activities without pain. Status: [] Progressing: [] Met: [] Not Met: [] Adjusted    Overall Progression Towards Functional goals/ Treatment Progress Update:  [] Patient is progressing as expected towards functional goals listed. [] Progression is slowed due to complexities/Impairments listed. [] Progression has been slowed due to co-morbidities.   [x] Plan just implemented, too soon (<30days) to assess goals progression   [] Goals require adjustment due to lack of progress  [] Patient is not progressing as expected and requires additional follow up with physician  [] Other:     CHARGE CAPTURE     CHARGE GRID   CPT Code (TIMED) minutes # CPT Code (UNTIMED) #     [x] Therex (38296)   2  [] EVAL:LOW (21022 - Typically 20 minutes

## 2023-11-27 ENCOUNTER — TRANSCRIBE ORDERS (OUTPATIENT)
Dept: ADMINISTRATIVE | Age: 58
End: 2023-11-27

## 2023-11-27 ENCOUNTER — HOSPITAL ENCOUNTER (OUTPATIENT)
Dept: PHYSICAL THERAPY | Age: 58
Setting detail: THERAPIES SERIES
Discharge: HOME | End: 2023-11-27
Attending: ORTHOPAEDIC SURGERY
Payer: COMMERCIAL

## 2023-11-27 DIAGNOSIS — C50.911 MALIGNANT NEOPLASM OF RIGHT FEMALE BREAST, UNSPECIFIED ESTROGEN RECEPTOR STATUS, UNSPECIFIED SITE OF BREAST (HCC): Primary | ICD-10-CM

## 2023-11-27 PROCEDURE — 97112 NEUROMUSCULAR REEDUCATION: CPT | Performed by: PHYSICAL THERAPIST

## 2023-11-27 PROCEDURE — 97110 THERAPEUTIC EXERCISES: CPT | Performed by: PHYSICAL THERAPIST

## 2023-11-27 NOTE — FLOWSHEET NOTE
[facility],Building [facility]., [address] [facility], [address] [place] [address] office: [phone] fax: [phone]           Physical Therapy: TREATMENT/PROGRESS NOTE   Patient: Marlene Pollard (51 y.o. female)   Treatment Date: 2023   :  1965 MRN: [id number]   Visit #:  5 post op  Insurance Allowable Auth Needed   30 []Yes    [x]No    Insurance: Payor: Arn Caves / Plan: BCBS - [place] PPO / Product Type: *No Product type* /   Insurance ID: [id number] - ([address] [place] [address])  Secondary Insurance (if applicable):    Treatment Diagnosis:     ICD-10-CM    1. Chronic pain of left knee  M25.562     G89.29          Medical Diagnosis:    Primary osteoarthritis of left knee [M17.12]       Signed         Orthopaedic Surgery  Operative Report  Date: 11/15/23   Pre-operative Diagnosis:   M17.12 Primary Osteoarthritis  Post-operative Diagnosis:    Same  Procedure: LEFT  88864 Total Knee Arthroplasty  41305 Robotic assisted computer navigated surgery                  Referring Physician: Luly Robb MD  PCP: Maciel Floyd MD                             Plan of care signed (Y/N): YES    Date of Patient follow up with Physician: per md     Progress Report/POC: NO  reeval post op visit   POC update due: 2023 (OR 10 visits /OR [address], whichever is less)    Precautions/Contraindications:     Preferred Language for Healthcare:   [x]English       []other:    SUBJECTIVE EXAMINATION     Patient Report/Comments: doing well.  RTW from home tomorrow     Test used Initial score  23 post op 2023   Pain Summary VAS 1-8/10 2/10   Functional questionnaire WOMAC 54    Other:                OBJECTIVE EXAMINATION     Observation:              SURGERY:     Scheduled Date: L TKA     11/15/23   Functional Testing Prehab  Date 10/26/2023 Post-op Re-Eval Date 2023 6 weeks from surgery  Date  D/C   Date        Current AD use None Str cane 23       TUG (sec)    8.49    X

## 2023-11-30 ENCOUNTER — HOSPITAL ENCOUNTER (OUTPATIENT)
Dept: PHYSICAL THERAPY | Age: 58
Setting detail: THERAPIES SERIES
Discharge: HOME | End: 2023-11-30
Attending: ORTHOPAEDIC SURGERY
Payer: COMMERCIAL

## 2023-11-30 PROCEDURE — 97110 THERAPEUTIC EXERCISES: CPT | Performed by: PHYSICAL THERAPIST

## 2023-11-30 PROCEDURE — 97112 NEUROMUSCULAR REEDUCATION: CPT | Performed by: PHYSICAL THERAPIST

## 2023-11-30 NOTE — FLOWSHEET NOTE
[facility] [address] [place] [address], [address] [facility], [address] [place] [address] office: [phone] fax: [phone]           Physical Therapy: TREATMENT/PROGRESS NOTE   Patient: Mitzi Alvarez (34 y.o. female)   Treatment Date: 2023   :  1965 MRN: [id number]   Visit #:  6 post op  Insurance Allowable Auth Needed   30 []Yes    [x]No    Insurance: Payor: [employer] / Plan: [facility] - [place] PPO / Product Type: *No Product type* /   Insurance ID: [id number] - ([facility])  Secondary Insurance (if applicable):    Treatment Diagnosis:     ICD-10-CM    1. Chronic pain of left knee  M25.562     G89.29          Medical Diagnosis:    Primary osteoarthritis of left knee [M17.12]       Signed         Orthopaedic Surgery  Operative Report  Date: 11/15/23   Pre-operative Diagnosis:   M17.12 Primary Osteoarthritis  Post-operative Diagnosis:    Same  Procedure: LEFT  48939 Total Knee Arthroplasty   Robotic assisted computer navigated surgery                  Referring Physician: Kuldip Davis MD  PCP: Beto Andersen MD                             Plan of care signed (Y/N): YES    Date of Patient follow up with Physician: per md     Progress Report/POC: NO  reeval post op visit   POC update due: 2023 (OR 10 visits /OR [address], whichever is less)    Precautions/Contraindications:     Preferred Language for Healthcare:   [x]English       []other:    SUBJECTIVE EXAMINATION     Patient Report/Comments: doing well.  L ankle feels funny today not sure how to describe it      Test used Initial score  23 post op 2023   Pain Summary VAS 1-8/10 2/10   Functional questionnaire WOMAC 54    Other:                OBJECTIVE EXAMINATION     Observation:              SURGERY:     Scheduled Date: L TKA     11/15/23   Functional Testing Prehab  Date 10/26/2023 Post-op Re-Eval Date 2023 6 weeks from surgery  Date  D/C   Date        Current AD use None Str cane 23

## 2023-12-01 ENCOUNTER — OFFICE VISIT (OUTPATIENT)
Dept: ORTHOPEDIC SURGERY | Age: 58
End: 2023-12-01

## 2023-12-01 VITALS — BODY MASS INDEX: 32.8 KG/M2 | HEIGHT: 67 IN | WEIGHT: 209 LBS

## 2023-12-01 DIAGNOSIS — Z96.652 STATUS POST TOTAL LEFT KNEE REPLACEMENT: Primary | ICD-10-CM

## 2023-12-01 PROCEDURE — 99024 POSTOP FOLLOW-UP VISIT: CPT | Performed by: ORTHOPAEDIC SURGERY

## 2023-12-01 RX ORDER — AMOXICILLIN AND CLAVULANATE POTASSIUM 875; 125 MG/1; MG/1
TABLET, FILM COATED ORAL
Qty: 6 TABLET | Refills: 2 | Status: SHIPPED | OUTPATIENT
Start: 2023-12-01

## 2023-12-01 NOTE — PROGRESS NOTES
Dr Supa Jackson      Date /Time 12/1/2023       11:07 AM EST  Name Daya Solis             1965   Location  [facility]  MRN [id number]                Chief Complaint   Patient presents with    Follow-up     1st PO Left TKA SX 11/15/2023        History of Present Illness      Daya Solis is a 62 y.o. female is here for post-op visit after LEFT  83287 Total Knee Arthroplasty    Patient presents to the office today for follow-up visit. Patient's status post total knee arthroplasty. Patient doing well. Patient denies fever, chills, or drainage. Pain controlled. Physical Exam    Based off 1997 Exam Criteria    LMP  (LMP Unknown)      Constitutional:       General: He is not in acute distress. Appearance: Normal appearance. LEFT Knee: incision clean, intact, healing appropriately. No surrounding  erythema or fluctuance. Neuro intact distal. No evidence of DVT. Range of motion:0-115    Imaging           Assessment and Plan    Diagnoses and all orders for this visit:    Status post total left knee replacement        Patient doing well. Patient will continue with physical therapy concentrating on range of motion. Patient will follow-up in 3 weeks for range of motion check and x-rays or sooner problems arise    Electronically signed by Supa Jackson MD on 12/1/2023 at 11:07 AM  This dictation was generated by voice recognition computer software. Although all attempts are made to edit the dictation for accuracy, there may be errors in the transcription that are not intended.

## 2023-12-04 ENCOUNTER — HOSPITAL ENCOUNTER (OUTPATIENT)
Dept: PHYSICAL THERAPY | Age: 58
Setting detail: THERAPIES SERIES
Discharge: HOME | End: 2023-12-04
Attending: ORTHOPAEDIC SURGERY
Payer: COMMERCIAL

## 2023-12-04 PROCEDURE — 97112 NEUROMUSCULAR REEDUCATION: CPT | Performed by: PHYSICAL THERAPIST

## 2023-12-04 PROCEDURE — 97110 THERAPEUTIC EXERCISES: CPT | Performed by: PHYSICAL THERAPIST

## 2023-12-04 NOTE — FLOWSHEET NOTE
second sit to stand w/out UE (reps) 11 X       Gait speed (m/s)  4x10m fast walk  Result = 40m/total time 35.52  X           Supine Hip/Knee AROM L R L R L R L R   Hip Flexion 106 105  x             Knee Flexion 130 131  117 11/30/23             Knee Extension -4 0 ~-3 11/22/23                 WOMAC (raw) 40/96  54/96         Gait:  good heel to toe gait with verbal cueing no AD 12/4    Test measurements:     Exercises/Interventions:   Exercise/Equipment Resistance/Repetitions Other comments   Stretching/AROM       2 chair knee ext hang 5 min with ankle pumps     Calf stretch 99svps8 incline    Hamstring stretch 50tcfk7     heel slides  X10 sheet pulls     EOB AAROM  X10 with OP into flexion    Supine heel slides  x10    Bike  5 min Start11/27             Strengthening     SLR flex supine 3x10 2# ^11/27   SLR abd SL  3x10 2# ^11/24    SLR ext At 3x10    TKE 3x10 CC 3 plates ^21/67   SAQ 6U69 2# ^11/27   Standing heel raise  3x10 Start11/22   Standing HS curl 3x10 2# ^11/27   LAQ 3x10 2# Start12/27        Isometrics     Quad set [id number]    Glut sets 61b57ecn                      Quantum machines       Leg press        Leg extension       Leg curl               Manual interventions        reviewed proper heel to toe gait with str cane       PT educ HEP 3x day, RICE            Modalities:     (Game Ready): Applied to Knee Left for significant edema, swelling, pain control for 15 minutes. Relevant ICD-10 R60.9 Edema unspecified. Education/Home Exercise Program: Patient HEP program created electronically. Refer to [address] access code: [email or web address]      ASSESSMENT     Today's Assessment: During therapy this date, patient required verbal cueing for exercise progression, improving proper muscle recruitment and activation/motor control patterns, and increasing ROM. Patient will continue to benefit from ongoing evaluation and advanced clinical decision from a Physical Therapist to address and improve pain control,

## 2023-12-05 ENCOUNTER — TELEPHONE (OUTPATIENT)
Dept: ORTHOPEDIC SURGERY | Age: 58
End: 2023-12-05

## 2023-12-05 NOTE — TELEPHONE ENCOUNTER
Patient continues to do well after Left TKR on 11/15/23. PO appointment completed on 12/01/23. Signed off from patient. Patient will call RN or Surgeon's office with any issues or concerns.

## 2023-12-07 ENCOUNTER — HOSPITAL ENCOUNTER (OUTPATIENT)
Dept: PHYSICAL THERAPY | Age: 58
Setting detail: THERAPIES SERIES
Discharge: HOME | End: 2023-12-07
Attending: ORTHOPAEDIC SURGERY
Payer: COMMERCIAL

## 2023-12-07 PROCEDURE — 97110 THERAPEUTIC EXERCISES: CPT | Performed by: PHYSICAL THERAPIST

## 2023-12-07 PROCEDURE — 97112 NEUROMUSCULAR REEDUCATION: CPT | Performed by: PHYSICAL THERAPIST

## 2023-12-07 NOTE — FLOWSHEET NOTE
30 second sit to stand w/out UE (reps) 11 X       Gait speed (m/s)  4x10m fast walk  Result = 40m/total time 35.52  X           Supine Hip/Knee AROM L R L R L R L R   Hip Flexion 106 105  x             Knee Flexion 130 131  117 12/07/23             Knee Extension -4 0 ~-3 11/22/23                 WOMAC (raw) 40/96  54/96         Gait:  good heel to toe gait with verbal cueing no AD 12/4    Test measurements:     Exercises/Interventions:   Exercise/Equipment Resistance/Repetitions Other comments   Stretching/AROM       2 chair knee ext hang 5 min with ankle pumps     Calf stretch 78ljdl1 incline    Hamstring stretch 14vvdj5     heel slides  X10 sheet pulls     EOB AAROM  X10 with OP into flexion    Supine heel slides  x10    Bike  5 min Start11/27             Strengthening     SLR flex supine 3x10 2# ^11/27   SLR abd SL  3x10 2# ^11/24    SLR ext At 3x10    TKE 3x10 CC 4 plates ^46/9   SAQ 4X34 2# ^11/27   Standing heel raise  3x10 Start11/22   Standing HS curl 3x10 2# ^11/27   LAQ 3x10 2# Start12/27        Isometrics     Quad set [id number]    Glut sets 99k96tuu         balance      SLS  49fnvw5 Start12/7   Quantum machines       Leg press        Leg extension       Leg curl               Manual interventions        reviewed proper heel to toe gait with str cane       PT educ HEP 3x day, RICE            Modalities:     (Game Ready): Applied to Knee Left for significant edema, swelling, pain control for 15 minutes. Relevant ICD-10 R60.9 Edema unspecified. Education/Home Exercise Program: Patient HEP program created electronically. Refer to [address] [place] access code: [email or web address]      ASSESSMENT     Today's Assessment: During therapy this date, patient required verbal cueing for exercise progression, improving proper muscle recruitment and activation/motor control patterns, and increasing ROM. Patient will continue to benefit from ongoing evaluation and advanced clinical decision from a Physical Therapist to

## 2023-12-11 ENCOUNTER — HOSPITAL ENCOUNTER (OUTPATIENT)
Dept: PHYSICAL THERAPY | Age: 58
Setting detail: THERAPIES SERIES
Discharge: HOME | End: 2023-12-11
Attending: ORTHOPAEDIC SURGERY
Payer: COMMERCIAL

## 2023-12-11 PROCEDURE — 97110 THERAPEUTIC EXERCISES: CPT | Performed by: PHYSICAL THERAPIST

## 2023-12-11 PROCEDURE — 97530 THERAPEUTIC ACTIVITIES: CPT | Performed by: PHYSICAL THERAPIST

## 2023-12-11 NOTE — FLOWSHEET NOTE
[facility],Building [facility]., [address] [facility], [address] [place] [address] office: [phone] fax: [phone]           Physical Therapy: TREATMENT/PROGRESS NOTE   Patient: Adia Lara (43 y.o. female)   Treatment Date: 2023   :  1965 MRN: [id number]   Visit #:  9 post op  Insurance Allowable Auth Needed   30 []Yes    [x]No    Insurance: Payor: Seth Celeste / Plan: BCBS - [place] PPO / Product Type: *No Product type* /   Insurance ID: [id number] - ([address] [place] [address])  Secondary Insurance (if applicable):    Treatment Diagnosis:     ICD-10-CM    1.  Chronic pain of left knee  M25.562     G89.29          Medical Diagnosis:    Primary osteoarthritis of left knee [M17.12]       Signed         Orthopaedic Surgery  Operative Report  Date: 11/15/23   Pre-operative Diagnosis:   M17.12 Primary Osteoarthritis  Post-operative Diagnosis:    Same  Procedure: LEFT  82744 Total Knee Arthroplasty   Robotic assisted computer navigated surgery                  Referring Physician: Ke Gomez MD  PCP: Karri Richardson MD                             Plan of care signed (Y/N): YES    Date of Patient follow up with Physician: per md     Progress Report/POC: NO  reeval post op visit   POC update due: 2023 (OR 10 visits /OR Heron Vasquez, whichever is less)    Precautions/Contraindications:     Preferred Language for Healthcare:   [x]English       []other:    SUBJECTIVE EXAMINATION     Patient Report/Comments: MD happy with current status     Test used Initial score  23 post op 2023   Pain Summary VAS 1-8/10 1/10   Functional questionnaire WOMAC 54    Other:                OBJECTIVE EXAMINATION     Observation:              SURGERY:     Scheduled Date: L TKA     11/15/23   Functional Testing Prehab  Date 10/26/2023 Post-op Re-Eval Date 2023 6 weeks from surgery  Date 2023 D/C   Date        Current AD use None Str cane 23       TUG (sec)    8.49    X

## 2023-12-12 DIAGNOSIS — M17.12 PRIMARY OSTEOARTHRITIS OF LEFT KNEE: ICD-10-CM

## 2023-12-12 RX ORDER — CELECOXIB 200 MG/1
200 CAPSULE ORAL DAILY
Qty: 60 CAPSULE | Refills: 3 | Status: SHIPPED | OUTPATIENT
Start: 2023-12-12

## 2023-12-12 NOTE — TELEPHONE ENCOUNTER
Medication:   Requested Prescriptions     Pending Prescriptions Disp Refills    celecoxib (CELEBREX) 200 MG capsule [Pharmacy Med Name: CELECOXIB 200MG CAPS] 60 capsule 3     Sig: TAKE ONE CAPSULE BY MOUTH ONCE A DAY     Last Filled:  07/12/2023    Last appt: 10/26/2023   Next appt: Visit date not found    Last OARRS:        No data to display

## 2023-12-14 ENCOUNTER — HOSPITAL ENCOUNTER (OUTPATIENT)
Dept: PHYSICAL THERAPY | Age: 58
Setting detail: THERAPIES SERIES
Discharge: HOME | End: 2023-12-14
Attending: ORTHOPAEDIC SURGERY
Payer: COMMERCIAL

## 2023-12-14 PROCEDURE — 97530 THERAPEUTIC ACTIVITIES: CPT | Performed by: PHYSICAL THERAPIST

## 2023-12-14 PROCEDURE — 97110 THERAPEUTIC EXERCISES: CPT | Performed by: PHYSICAL THERAPIST

## 2023-12-14 NOTE — FLOWSHEET NOTE
[facility],Building [facility]., [address] [facility], [address] [place] [address] office: [phone] fax: [phone]           Physical Therapy: TREATMENT/PROGRESS NOTE   Patient: Marlene Pollard (89 y.o. female)   Treatment Date: 2023   :  1965 MRN: [id number]   Visit #:  10 post op  Insurance Allowable Auth Needed   30 []Yes    [x]No    Insurance: Payor: Arn Caves / Plan: [facility] - [place] PPO / Product Type: *No Product type* /   Insurance ID: [id number] - ([address] [place] [address])  Secondary Insurance (if applicable):    Treatment Diagnosis:     ICD-10-CM    1.  Chronic pain of left knee  M25.562     G89.29          Medical Diagnosis:    Primary osteoarthritis of left knee [M17.12]       Signed         Orthopaedic Surgery  Operative Report  Date: 11/15/23   Pre-operative Diagnosis:   M17.12 Primary Osteoarthritis  Post-operative Diagnosis:    Same  Procedure: LEFT   Total Knee Arthroplasty   Robotic assisted computer navigated surgery                  Referring Physician: Luly Robb MD  PCP: Maciel Floyd MD                             Plan of care signed (Y/N): YES    Date of Patient follow up with Physician: per md     Progress Report/POC: NO  reeval post op visit   POC update due: 2023 (OR 10 visits /OR [address], whichever is less)    Precautions/Contraindications:     Preferred Language for Healthcare:   [x]English       []other:    SUBJECTIVE EXAMINATION     Patient Report/Comments: MD happy with current status     Test used Initial score  23 post op 2023   Pain Summary VAS 1-8/10 1/10   Functional questionnaire WOMAC 54    Other:                OBJECTIVE EXAMINATION     Observation:              SURGERY:     Scheduled Date: L TKA     11/15/23   Functional Testing Prehab  Date 10/26/2023 Post-op Re-Eval Date 2023 6 weeks from surgery  Date 2023 D/C   Date        Current AD use None Str cane 23       TUG (sec)    8.49    X

## 2024-02-07 NOTE — PROGRESS NOTES
Diagnosis: [x] TANYA (G47.33) [] CSA (G47.31) [] Apnea (G47.30)   Length of Need: [x] 15 Months [] 99 Months [] Other:   Machine (GISEL!): [] Respironics Dream Station      Auto [] ResMed AirSense     Auto [] Other:     []  CPAP () [] Bilevel ()   Mode: [] Auto [] Spontaneous    Mode: [] Auto [] Spontaneous            Comfort Settings:      Humidifier: [] Heated ()        [x] Water chamber replacement ()/ 1 per 6 months        Mask:   [x] Nasal () /1 per 3 months [] Full Face () /1 per 3 months   [x] Patient choice -Size and fit mask [] Patient Choice - Size and fit mask   [] Dispense: [] Dispense:   [x] Headgear () / 1 per 3 months [] Headgear () / 1 per 3 months   [x] Replacement Nasal Cushion ()/2 per month [] Interface Replacement ()/1 per month   [] Replacement Nasal Pillows ()/2 per month         Tubing: [x] Heated ()/1 per 3 months    [] Standard ()/1 per 3 months [] Other:           Filters: [x] Non-disposable ()/1 per 6 months     [x] Ultra-Fine, Disposable ()/2 per month        Miscellaneous: [] Chin Strap ()/ 1 per 6 months [] O2 bleed-in:        LPM   [] Oxymetry on CPAP/Bilevel []  Other:         Start Order Date: 02/07/24    MEDICAL JUSTIFICATION:  I, the undersigned, certify that the above prescribed supplies are medically necessary for this patient’s wellbeing.  In my opinion, the supplies are both reasonable and necessary in reference to accepted standards of medicalpractice in treatment of this patient’s condition.    ANASTASIIA LANDON NP    NPI: [id number]       Order Signed Date: 02/07/24  [facility]  Pulmonary, Sleep, and Critical Care    Pulmonary, Sleep, and Critical Care  [facility] [address]                          [address] [place] [address] [address]  [place] [address]                                    [place] [address]  Phone: [phone]    Fax:

## 2024-02-08 ENCOUNTER — HOSPITAL ENCOUNTER (OUTPATIENT)
Dept: MAMMOGRAPHY | Age: 59
Discharge: HOME | End: 2024-02-08
Payer: COMMERCIAL

## 2024-02-08 ENCOUNTER — APPOINTMENT (OUTPATIENT)
Dept: ULTRASOUND IMAGING | Age: 59
End: 2024-02-08
Payer: COMMERCIAL

## 2024-02-08 VITALS — WEIGHT: 200 LBS | BODY MASS INDEX: 31.39 KG/M2 | HEIGHT: 67 IN

## 2024-02-08 DIAGNOSIS — C50.911 MALIGNANT NEOPLASM OF RIGHT FEMALE BREAST, UNSPECIFIED ESTROGEN RECEPTOR STATUS, UNSPECIFIED SITE OF BREAST (HCC): ICD-10-CM

## 2024-02-08 PROCEDURE — G0279 TOMOSYNTHESIS, MAMMO: HCPCS

## 2024-03-01 ENCOUNTER — OFFICE VISIT (OUTPATIENT)
Dept: ORTHOPEDIC SURGERY | Age: 59
End: 2024-03-01

## 2024-03-01 DIAGNOSIS — Z96.652 STATUS POST TOTAL LEFT KNEE REPLACEMENT: Primary | ICD-10-CM

## 2024-03-01 NOTE — PROGRESS NOTES
Dr Carlos Arroyo      Date /Time 12/1/2023       11:07 AM EST  Name Iris Gomez             1965   Location  [facility]  MRN [id number]                Chief Complaint   Patient presents with    Follow-up     1st PO Left TKA SX 11/15/2023        History of Present Illness      Iris Gomez is a 58 y.o. female is here for post-op visit after LEFT  89642 Total Knee Arthroplasty    Patient presents to the office today for follow-up visit.  Patient's status post total knee arthroplasty.  Patient doing well.  Patient denies fever, chills, or drainage.  Pain controlled.    Physical Exam    Based off 1997 Exam Criteria    LMP  (LMP Unknown)      Constitutional:       General: He is not in acute distress.     Appearance: Normal appearance.     LEFT Knee: incision clean, intact, healing appropriately. No surrounding  erythema or fluctuance. Neuro intact distal. No evidence of DVT.    Range of motion:0-120    Imaging       X-rays were ordered for reviewed of the left knee.  3 views.  Standing AP, lateral, skyline views.  They demonstrate a left total knee arthroplasty in good position.  No evidence of loosening or periprosthetic fracture    Assessment and Plan    Diagnoses and all orders for this visit:    Status post total left knee replacement        Patient doing well.  Continue with exercises patient has learned in physical therapy on her own at home.  Follow-up 1 year from surgery or sooner if problems arise.  We have discussed predental and preinvasive procedure antibiotics    I discussed with Iris Gomez that her history, symptoms, signs, and imaging are most consistent with previous TKA replacement.    We reviewed the natural history of these conditions and treatment options ranging from conservative measures (rest, icing, activity modification, physical therapy, pain meds) to surgical options.     In terms of treatment, I recommended continuing with rest, icing, avoidance of painful

## 2024-03-04 ENCOUNTER — OFFICE VISIT (OUTPATIENT)
Dept: ORTHOPEDIC SURGERY | Age: 59
End: 2024-03-04
Payer: COMMERCIAL

## 2024-03-04 ENCOUNTER — TELEPHONE (OUTPATIENT)
Dept: ORTHOPEDIC SURGERY | Age: 59
End: 2024-03-04

## 2024-03-04 VITALS — HEIGHT: 67 IN | WEIGHT: 200 LBS | BODY MASS INDEX: 31.39 KG/M2

## 2024-03-04 DIAGNOSIS — S86.312A STRAIN OF PERONEAL TENDON OF LEFT FOOT, INITIAL ENCOUNTER: Primary | ICD-10-CM

## 2024-03-04 PROCEDURE — 99204 OFFICE O/P NEW MOD 45 MIN: CPT | Performed by: INTERNAL MEDICINE

## 2024-03-04 PROCEDURE — L4361 PNEUMA/VAC WALK BOOT PRE OTS: HCPCS | Performed by: INTERNAL MEDICINE

## 2024-03-04 NOTE — PROGRESS NOTES
measures for symptom control trial of Voltaren gel  Foot intrinsics demonstrated in the office  Limited ultrasound evaluation of the peroneal tendon complex as needed    Pain and disability are proportion to clinical findings and x-ray evaluation.  Consider advanced imaging of the foot and ankle as needed      The nature of the finding, probable diagnosis and likely treatment was thoroughly discussed with the patient. The options, risks, complications, alternative treatment as well as some of the differential diagnosis was discussed. The patient was thoroughly informed and all questions were answered. the patient indicated understanding and satisfaction with the discussion.      Orders:        Orders Placed This Encounter   Procedures    XR FOOT LEFT (MIN 3 VIEWS)     Standing Status:   Future     Number of Occurrences:   1     Standing Expiration Date:   3/4/2025     Order Specific Question:   Reason for exam:     Answer:   Foot Pain    Breg / Airselect Short Walking Boot     Patient was prescribed a Breg Short Janna Walking Boot.  The left foot will require stabilization / immobilization from this semi-rigid / rigid orthosis to improve their function.  The orthosis will assist in protecting the affected area, provide functional support and facilitate healing.    Patient was instructed to progress ambulation weight bearing as tolerated in the device.     The patient was educated and fit by a healthcare professional with expert knowledge and specialization in brace application while under the direct supervision of the physician.  Verbal and written instructions for the use of and application of this item were provided.   They were instructed to contact the office immediately should the brace result in increased pain, decreased sensation, increased swelling or worsening of the condition.           Disclaimer:    \"This note was dictated with voice recognition software. Though review and correction are routine, we

## 2024-03-26 DIAGNOSIS — G25.81 RLS (RESTLESS LEGS SYNDROME): ICD-10-CM

## 2024-03-26 RX ORDER — PRAMIPEXOLE DIHYDROCHLORIDE 1.5 MG/1
1.5 TABLET ORAL NIGHTLY
Qty: 90 TABLET | Refills: 0 | Status: SHIPPED | OUTPATIENT
Start: 2024-03-26

## 2024-03-26 NOTE — TELEPHONE ENCOUNTER
Medication:   Requested Prescriptions     Pending Prescriptions Disp Refills    pramipexole (MIRAPEX) 1.5 MG tablet [Pharmacy Med Name: PRAMIPEXOLE DIHYDROCHLOR 1.5MG TABS] 90 tablet 1     Sig: TAKE ONE TABLET BY MOUTH EVERY EVENING     Last Filled:  10.26.23    Last appt: 10/26/2023   Next appt: Visit date not found    Last OARRS:        No data to display

## 2024-03-31 DIAGNOSIS — E78.00 HYPERCHOLESTEREMIA: ICD-10-CM

## 2024-04-01 RX ORDER — ROSUVASTATIN CALCIUM 10 MG/1
10 TABLET, COATED ORAL NIGHTLY
Qty: 30 TABLET | Refills: 3 | Status: SHIPPED | OUTPATIENT
Start: 2024-04-01

## 2024-04-30 ENCOUNTER — PATIENT MESSAGE (OUTPATIENT)
Dept: PRIMARY CARE CLINIC | Age: 59
End: 2024-04-30

## 2024-04-30 DIAGNOSIS — F33.41 RECURRENT MAJOR DEPRESSIVE DISORDER, IN PARTIAL REMISSION (HCC): ICD-10-CM

## 2024-04-30 RX ORDER — VENLAFAXINE HYDROCHLORIDE 75 MG/1
75 CAPSULE, EXTENDED RELEASE ORAL DAILY
Qty: 90 CAPSULE | Refills: 0 | Status: SHIPPED | OUTPATIENT
Start: 2024-04-30

## 2024-04-30 NOTE — TELEPHONE ENCOUNTER
Medication:   Requested Prescriptions     Pending Prescriptions Disp Refills    venlafaxine (EFFEXOR XR) 75 MG extended release capsule 90 capsule 3     Sig: Take 1 capsule by mouth daily     Last Filled:  5.9.23    Last appt: 10/26/2023   Next appt: Visit date not found    Last OARRS:        No data to display

## 2024-04-30 NOTE — TELEPHONE ENCOUNTER
From: Iris Gomez  To: Dr. Shakira Coreas  Sent: 4/30/2024 11:52 AM EDT  Subject: Prescription     Hello,    I got a message from [facility] stating that my prescription for Venlafaxine 75 mg extended release needs to be reordered.  Can you do that please.    Thanks,  Elise Gomez

## 2024-06-06 DIAGNOSIS — M17.12 PRIMARY OSTEOARTHRITIS OF LEFT KNEE: ICD-10-CM

## 2024-06-06 RX ORDER — CELECOXIB 200 MG/1
200 CAPSULE ORAL DAILY
Qty: 60 CAPSULE | Refills: 0 | Status: SHIPPED | OUTPATIENT
Start: 2024-06-06 | End: 2024-06-07 | Stop reason: SDUPTHER

## 2024-06-06 NOTE — TELEPHONE ENCOUNTER
Medication:   Requested Prescriptions     Pending Prescriptions Disp Refills    celecoxib (CELEBREX) 200 MG capsule [Pharmacy Med Name: CELECOXIB 200MG CAPS] 60 capsule 3     Sig: TAKE ONE CAPSULE BY MOUTH ONCE A DAY     Last Filled:  12/12/23    Last appt: 10/26/2023   Next appt: 6/7/24    Last OARRS:        No data to display

## 2024-06-07 ENCOUNTER — OFFICE VISIT (OUTPATIENT)
Dept: PRIMARY CARE CLINIC | Age: 59
End: 2024-06-07
Payer: COMMERCIAL

## 2024-06-07 VITALS
DIASTOLIC BLOOD PRESSURE: 80 MMHG | WEIGHT: 198.4 LBS | SYSTOLIC BLOOD PRESSURE: 120 MMHG | OXYGEN SATURATION: 96 % | BODY MASS INDEX: 31.14 KG/M2 | HEIGHT: 67 IN | HEART RATE: 91 BPM

## 2024-06-07 DIAGNOSIS — C50.911 MALIGNANT NEOPLASM OF RIGHT FEMALE BREAST, UNSPECIFIED ESTROGEN RECEPTOR STATUS, UNSPECIFIED SITE OF BREAST (HCC): ICD-10-CM

## 2024-06-07 DIAGNOSIS — G25.81 RLS (RESTLESS LEGS SYNDROME): ICD-10-CM

## 2024-06-07 DIAGNOSIS — E78.00 HYPERCHOLESTEREMIA: Primary | ICD-10-CM

## 2024-06-07 DIAGNOSIS — R73.9 HYPERGLYCEMIA: ICD-10-CM

## 2024-06-07 DIAGNOSIS — F33.41 RECURRENT MAJOR DEPRESSIVE DISORDER, IN PARTIAL REMISSION (HCC): ICD-10-CM

## 2024-06-07 DIAGNOSIS — M17.12 PRIMARY OSTEOARTHRITIS OF LEFT KNEE: ICD-10-CM

## 2024-06-07 PROCEDURE — 99214 OFFICE O/P EST MOD 30 MIN: CPT | Performed by: FAMILY MEDICINE

## 2024-06-07 RX ORDER — VENLAFAXINE HYDROCHLORIDE 75 MG/1
75 CAPSULE, EXTENDED RELEASE ORAL DAILY
Qty: 90 CAPSULE | Refills: 1 | Status: SHIPPED | OUTPATIENT
Start: 2024-06-07

## 2024-06-07 RX ORDER — PRAMIPEXOLE DIHYDROCHLORIDE 1.5 MG/1
1.5 TABLET ORAL NIGHTLY
Qty: 90 TABLET | Refills: 1 | Status: SHIPPED | OUTPATIENT
Start: 2024-06-07

## 2024-06-07 RX ORDER — ROSUVASTATIN CALCIUM 10 MG/1
10 TABLET, COATED ORAL NIGHTLY
Qty: 90 TABLET | Refills: 1 | Status: SHIPPED | OUTPATIENT
Start: 2024-06-07

## 2024-06-07 RX ORDER — CELECOXIB 200 MG/1
200 CAPSULE ORAL DAILY
Qty: 60 CAPSULE | Refills: 0 | Status: SHIPPED | OUTPATIENT
Start: 2024-06-07

## 2024-06-07 ASSESSMENT — PATIENT HEALTH QUESTIONNAIRE - PHQ9
10. IF YOU CHECKED OFF ANY PROBLEMS, HOW DIFFICULT HAVE THESE PROBLEMS MADE IT FOR YOU TO DO YOUR WORK, TAKE CARE OF THINGS AT HOME, OR GET ALONG WITH OTHER PEOPLE: NOT DIFFICULT AT ALL
4. FEELING TIRED OR HAVING LITTLE ENERGY: NOT AT ALL
SUM OF ALL RESPONSES TO PHQ QUESTIONS 1-9: 0
5. POOR APPETITE OR OVEREATING: NOT AT ALL
SUM OF ALL RESPONSES TO PHQ QUESTIONS 1-9: 0
SUM OF ALL RESPONSES TO PHQ QUESTIONS 1-9: 0
3. TROUBLE FALLING OR STAYING ASLEEP: NOT AT ALL
7. TROUBLE CONCENTRATING ON THINGS, SUCH AS READING THE NEWSPAPER OR WATCHING TELEVISION: NOT AT ALL
6. FEELING BAD ABOUT YOURSELF - OR THAT YOU ARE A FAILURE OR HAVE LET YOURSELF OR YOUR FAMILY DOWN: NOT AT ALL
SUM OF ALL RESPONSES TO PHQ9 QUESTIONS 1 & 2: 0
2. FEELING DOWN, DEPRESSED OR HOPELESS: NOT AT ALL
SUM OF ALL RESPONSES TO PHQ QUESTIONS 1-9: 0
1. LITTLE INTEREST OR PLEASURE IN DOING THINGS: NOT AT ALL
9. THOUGHTS THAT YOU WOULD BE BETTER OFF DEAD, OR OF HURTING YOURSELF: NOT AT ALL
8. MOVING OR SPEAKING SO SLOWLY THAT OTHER PEOPLE COULD HAVE NOTICED. OR THE OPPOSITE, BEING SO FIGETY OR RESTLESS THAT YOU HAVE BEEN MOVING AROUND A LOT MORE THAN USUAL: NOT AT ALL

## 2024-06-07 ASSESSMENT — ENCOUNTER SYMPTOMS
EYES NEGATIVE: 1
RESPIRATORY NEGATIVE: 1
GASTROINTESTINAL NEGATIVE: 1

## 2024-06-07 NOTE — PROGRESS NOTES
SUBJECTIVE:  Patient ID: Iris Gomez is a 59 y.o. y.o. female     HPI   Patient presents today for follow-up.  Overall doing well  Patient with hyperlipidemia on medication she is not fasting today she will be back fasting she needs refill on her Crestor tolerating medication well  Patient with restless leg syndrome on Mirapex stable needs refill  Patient with arthritis of the left knee status post surgery recovering well  Patient with hyperglycemia she is trying to eat better she does exercise her mom with diabetes she will be back fasting  Patient's status post breast cancer stable  Patient with depression stable on Effexor needs refill  Patient uses Celebrex daily discussed side effect long-term outcome she tries to take it every other day but she felt the difference  Past Medical History:   Diagnosis Date    ADHD (attention deficit hyperactivity disorder) 2010    ADD    Allergic rhinitis 2000    Itching    Anxiety     Back pain     Breast cancer ([facility]) 2020    right    Breast Cancer - right breast ([facility])     Dr. Modi: pN0 January 2021: ER/NH+, HER2 Neg :  Aromatase Inhibitor tx (will need DEXA)    History of therapeutic radiation     Hypercholesteremia     Obstructive sleep apnea (adult) (pediatric)     APAP 10-18 (ave 12 cwp)    TANYA on CPAP     Osteoarthritis 2022    Knees    PONV (postoperative nausea and vomiting)     Restless leg     Sleep apnea     uses cpap      Past Surgical History:   Procedure Laterality Date    ACHILLES TENDON SURGERY Right     BREAST BIOPSY Right 01/26/2021    RIGHT SENTINEL LYMPH NODE BIOPSY performed by Jaclyn Tran DO at [facility] OR    BREAST REDUCTION SURGERY  10/29/2020    BREAST REDUCTION SURGERY Bilateral 10/29/2020    BILATERAL BREAST REDUCTION AND SPY performed by Madison Pruitt MD at [facility] OR    CHOLECYSTECTOMY  1991    COLONOSCOPY N/A 06/11/2020    COLORECTAL CANCER SCREENING, HIGH RISK performed by Antonio Griffin MD at [facility] ENDOSCOPY    HYSTERECTOMY (CERVIX STATUS

## 2024-06-24 ENCOUNTER — HOSPITAL ENCOUNTER (OUTPATIENT)
Dept: GENERAL RADIOLOGY | Age: 59
Discharge: HOME | End: 2024-06-24
Payer: COMMERCIAL

## 2024-06-24 ENCOUNTER — OFFICE VISIT (OUTPATIENT)
Dept: PRIMARY CARE CLINIC | Age: 59
End: 2024-06-24
Payer: COMMERCIAL

## 2024-06-24 ENCOUNTER — PATIENT MESSAGE (OUTPATIENT)
Dept: PRIMARY CARE CLINIC | Age: 59
End: 2024-06-24

## 2024-06-24 VITALS
BODY MASS INDEX: 31.71 KG/M2 | OXYGEN SATURATION: 93 % | HEIGHT: 67 IN | SYSTOLIC BLOOD PRESSURE: 110 MMHG | DIASTOLIC BLOOD PRESSURE: 88 MMHG | WEIGHT: 202 LBS | HEART RATE: 99 BPM

## 2024-06-24 DIAGNOSIS — M25.512 ACUTE PAIN OF LEFT SHOULDER: ICD-10-CM

## 2024-06-24 DIAGNOSIS — G89.29 CHRONIC BILATERAL LOW BACK PAIN WITHOUT SCIATICA: ICD-10-CM

## 2024-06-24 DIAGNOSIS — M54.50 CHRONIC BILATERAL LOW BACK PAIN WITHOUT SCIATICA: Primary | ICD-10-CM

## 2024-06-24 DIAGNOSIS — G89.29 CHRONIC BILATERAL LOW BACK PAIN WITHOUT SCIATICA: Primary | ICD-10-CM

## 2024-06-24 DIAGNOSIS — M25.571 ACUTE RIGHT ANKLE PAIN: ICD-10-CM

## 2024-06-24 DIAGNOSIS — M54.50 CHRONIC BILATERAL LOW BACK PAIN WITHOUT SCIATICA: ICD-10-CM

## 2024-06-24 DIAGNOSIS — R73.9 HYPERGLYCEMIA: ICD-10-CM

## 2024-06-24 DIAGNOSIS — E78.00 HYPERCHOLESTEREMIA: ICD-10-CM

## 2024-06-24 LAB
ALBUMIN SERPL-MCNC: 4.7 G/DL (ref 3.4–5)
ALP SERPL-CCNC: 112 U/L (ref 40–129)
ALT SERPL-CCNC: 16 U/L (ref 10–40)
ANION GAP SERPL CALCULATED.3IONS-SCNC: 6 MMOL/L (ref 3–16)
AST SERPL-CCNC: 19 U/L (ref 15–37)
BILIRUB DIRECT SERPL-MCNC: <0.2 MG/DL (ref 0–0.3)
BILIRUB INDIRECT SERPL-MCNC: NORMAL MG/DL (ref 0–1)
BILIRUB SERPL-MCNC: 0.3 MG/DL (ref 0–1)
BUN SERPL-MCNC: 16 MG/DL (ref 7–20)
CALCIUM SERPL-MCNC: 9.8 MG/DL (ref 8.3–10.6)
CHLORIDE SERPL-SCNC: 104 MMOL/L (ref 99–110)
CHOLEST SERPL-MCNC: 220 MG/DL (ref 0–199)
CO2 SERPL-SCNC: 30 MMOL/L (ref 21–32)
CREAT SERPL-MCNC: 0.9 MG/DL (ref 0.6–1.1)
CRP SERPL-MCNC: 4.5 MG/L (ref 0–5.1)
DEPRECATED RDW RBC AUTO: 13.6 % (ref 12.4–15.4)
ERYTHROCYTE [SEDIMENTATION RATE] IN BLOOD BY WESTERGREN METHOD: 21 MM/HR (ref 0–30)
GFR SERPLBLD CREATININE-BSD FMLA CKD-EPI: 73 ML/MIN/{1.73_M2}
GLUCOSE SERPL-MCNC: 93 MG/DL (ref 70–99)
HCT VFR BLD AUTO: 44.5 % (ref 36–48)
HDLC SERPL-MCNC: 50 MG/DL (ref 40–60)
HGB BLD-MCNC: 15.1 G/DL (ref 12–16)
LDLC SERPL CALC-MCNC: 123 MG/DL
MCH RBC QN AUTO: 30.1 PG (ref 26–34)
MCHC RBC AUTO-ENTMCNC: 33.9 G/DL (ref 31–36)
MCV RBC AUTO: 88.7 FL (ref 80–100)
PLATELET # BLD AUTO: 268 K/UL (ref 135–450)
PMV BLD AUTO: 8.3 FL (ref 5–10.5)
POTASSIUM SERPL-SCNC: 4.9 MMOL/L (ref 3.5–5.1)
PROT SERPL-MCNC: 7 G/DL (ref 6.4–8.2)
RBC # BLD AUTO: 5.01 M/UL (ref 4–5.2)
RHEUMATOID FACT SER IA-ACNC: <10 IU/ML
SODIUM SERPL-SCNC: 140 MMOL/L (ref 136–145)
TRIGL SERPL-MCNC: 236 MG/DL (ref 0–150)
TSH SERPL DL<=0.005 MIU/L-ACNC: 3.77 UIU/ML (ref 0.27–4.2)
VLDLC SERPL CALC-MCNC: 47 MG/DL
WBC # BLD AUTO: 6 K/UL (ref 4–11)

## 2024-06-24 PROCEDURE — 72081 X-RAY EXAM ENTIRE SPI 1 VW: CPT

## 2024-06-24 PROCEDURE — 99214 OFFICE O/P EST MOD 30 MIN: CPT | Performed by: FAMILY MEDICINE

## 2024-06-24 PROCEDURE — 73030 X-RAY EXAM OF SHOULDER: CPT

## 2024-06-24 PROCEDURE — 73600 X-RAY EXAM OF ANKLE: CPT

## 2024-06-24 RX ORDER — CYCLOBENZAPRINE HCL 10 MG
10 TABLET ORAL 2 TIMES DAILY PRN
Qty: 28 TABLET | Refills: 1 | Status: SHIPPED | OUTPATIENT
Start: 2024-06-24

## 2024-06-24 ASSESSMENT — ENCOUNTER SYMPTOMS
RESPIRATORY NEGATIVE: 1
EYES NEGATIVE: 1
BACK PAIN: 1
GASTROINTESTINAL NEGATIVE: 1

## 2024-06-24 NOTE — TELEPHONE ENCOUNTER
From: Iris Gomez  To: Dr. Shakira Coreas  Sent: 6/24/2024 7:20 AM EDT  Subject: X-ray needed,    Good morning,    I think I have an AC joint separation on my left side. It started hurting last week and it is very painful. There is no injury associated with the pain.  Also, I'm not sure why my lateral ankle bone is hurting but it's been hurting for a few months. I've done PT for it, and it hasn't helped. It's very sore and swollen.     Can you please see me so I can have them evaluated.  Thanks, Elise Gomez

## 2024-06-24 NOTE — PROGRESS NOTES
hiatus from smoking during those years   Vaping Use    Vaping Use: Never used   Substance and Sexual Activity    Alcohol use: Not Currently     Comment: socially - beer    Drug use: Yes     Types: Marijuana (Weed)     Comment: Gummies    Sexual activity: Yes     Partners: Female     Social Determinants of Health     Financial Resource Strain: Low Risk  (7/12/2023)    Overall Financial Resource Strain (CARDIA)     Difficulty of Paying Living Expenses: Not hard at all   Transportation Needs: Unknown (7/12/2023)    PRAPARE - Transportation     Lack of Transportation (Non-Medical): No   Housing Stability: Unknown (7/12/2023)    Housing Stability Vital Sign     Unstable Housing in the Last Year: No     Current Outpatient Medications   Medication Sig Dispense Refill    venlafaxine (EFFEXOR XR) 75 MG extended release capsule Take 1 capsule by mouth daily 90 capsule 1    rosuvastatin (CRESTOR) 10 MG tablet Take 1 tablet by mouth nightly 90 tablet 1    pramipexole (MIRAPEX) 1.5 MG tablet Take 1 tablet by mouth nightly 90 tablet 1    celecoxib (CELEBREX) 200 MG capsule Take 1 capsule by mouth daily 60 capsule 0    cyclobenzaprine (FLEXERIL) 10 MG tablet Take 1 tablet by mouth 2 times daily as needed for Muscle spasms 28 tablet 0    cetirizine (ZYRTEC) 10 MG tablet Take 1 tablet by mouth nightly       No current facility-administered medications for this visit.     No Known Allergies    Review of Systems   Constitutional: Negative.    HENT: Negative.     Eyes: Negative.    Respiratory: Negative.     Cardiovascular: Negative.    Gastrointestinal: Negative.    Musculoskeletal:  Positive for arthralgias, back pain and myalgias.   All other systems reviewed and are negative.      OBJECTIVE:  /88   Pulse 99   Ht 1.702 m (5' 7\")   Wt 91.6 kg (202 lb)   LMP  (LMP Unknown)   SpO2 93%   BMI 31.64 kg/m²     Physical Exam  Vitals reviewed.   Eyes:      General: No scleral icterus.     Conjunctiva/sclera: Conjunctivae normal.

## 2024-06-25 LAB
ANA SER QL IA: NEGATIVE
EST. AVERAGE GLUCOSE BLD GHB EST-MCNC: 114 MG/DL
HBA1C MFR BLD: 5.6 %

## 2024-06-27 DIAGNOSIS — M25.571 ACUTE RIGHT ANKLE PAIN: Primary | ICD-10-CM

## 2024-06-27 DIAGNOSIS — M25.512 ACUTE PAIN OF LEFT SHOULDER: ICD-10-CM

## 2024-07-05 SDOH — HEALTH STABILITY: PHYSICAL HEALTH: ON AVERAGE, HOW MANY MINUTES DO YOU ENGAGE IN EXERCISE AT THIS LEVEL?: 40 MIN

## 2024-07-05 SDOH — HEALTH STABILITY: PHYSICAL HEALTH: ON AVERAGE, HOW MANY DAYS PER WEEK DO YOU ENGAGE IN MODERATE TO STRENUOUS EXERCISE (LIKE A BRISK WALK)?: 6 DAYS

## 2024-07-08 ENCOUNTER — HOSPITAL ENCOUNTER (OUTPATIENT)
Dept: MRI IMAGING | Age: 59
Discharge: HOME | End: 2024-07-08
Attending: ORTHOPAEDIC SURGERY
Payer: COMMERCIAL

## 2024-07-08 ENCOUNTER — OFFICE VISIT (OUTPATIENT)
Dept: ORTHOPEDIC SURGERY | Age: 59
End: 2024-07-08
Payer: COMMERCIAL

## 2024-07-08 ENCOUNTER — TELEPHONE (OUTPATIENT)
Dept: ORTHOPEDIC SURGERY | Age: 59
End: 2024-07-08

## 2024-07-08 VITALS — HEIGHT: 67 IN | WEIGHT: 202 LBS | BODY MASS INDEX: 31.71 KG/M2

## 2024-07-08 DIAGNOSIS — M25.571 RIGHT ANKLE PAIN, UNSPECIFIED CHRONICITY: Primary | ICD-10-CM

## 2024-07-08 DIAGNOSIS — M25.571 RIGHT ANKLE PAIN, UNSPECIFIED CHRONICITY: ICD-10-CM

## 2024-07-08 PROCEDURE — 73721 MRI JNT OF LWR EXTRE W/O DYE: CPT

## 2024-07-08 PROCEDURE — 99204 OFFICE O/P NEW MOD 45 MIN: CPT | Performed by: ORTHOPAEDIC SURGERY

## 2024-07-08 NOTE — TELEPHONE ENCOUNTER
[facility] CALLING REGARDING MRI, PATIENT STATES IT IS FOR HER FOOT BUT ORDER IS FOR KNEE.  PLEASE RESEND ORDER FOR PATIENT TO SCHEDULE.

## 2024-07-09 ENCOUNTER — TELEPHONE (OUTPATIENT)
Dept: ORTHOPEDIC SURGERY | Age: 59
End: 2024-07-09

## 2024-07-09 NOTE — TELEPHONE ENCOUNTER
General Question     Subject: MRI RESULTS  Patient and /or Facility Request: Iris Gomez   Contact Number: [phone]     PT CLLED TO REQ SOMEONE CLL HER WITH MRI RESULTS OFFERED A FOLLOW APPT PT REQ CLL SHE DIDN'T WANT TO PAY ANOTHER CO PAY    PLEASE CLL TO ADV

## 2024-07-09 NOTE — TELEPHONE ENCOUNTER
EXAM: MRI ANKLE RIGHT WO CONTRAST      CLINICAL  INDICATION: Right ankle pain     COMPARISON: Radiograph 6/24/2024      TECHNIQUE: Multiphasic multiplanar noncontrast MR imaging of the ankle was obtained.     FINDINGS:      Very limited signal changes seen in the tip of the lateral malleolus with a small adjacent fracture fragment suggesting an avulsion type injury at the tip of the lateral malleolus. Thickening and increased signal of the anterior, posterior talofibular   ligament and calcaneal ligament may represent lateral ligament sprains without evidence of full-thickness tear. There are osteophytic changes at tibiotalar joint with a small cyst or chronic osteochondral injury within the lateral talar dome. Small   posterior subtalar joint effusion.     The Achilles tendon demonstrates thickening and increased signal suggesting tendinosis and/or partial-thickness tearing. The plantar fascial appears intact.     There is thickening and increased signal the peroneus brevis and longus tendons suggesting split tears with Ria synovitis and lateral ankle subcutaneous edematous changes. The intrinsic flexor and extensor tendons of the ankle are otherwise intact.   Muscular signal otherwise normal. Sinus tarsi is unremarkable.     IMPRESSION:        1. Age-indeterminate avulsion injury at the tip of the lateral malleolus which demonstrates some edema which may represent a more recent injury. Soft tissue inflammation over the lateral ankle may represent associated hematoma.  2. Laterally bent sprains without full-thickness tear  3. Split tears of the peroneus brevis and longus tendons with tenosynovitis  4. Achilles tendinosis versus partial thickness tearing.  5. Osteoarthritic changes.

## 2024-07-09 NOTE — TELEPHONE ENCOUNTER
Avulsion fracture with tendonitis. Recommend boot and follow up next week or after I am back in office     SPOKE WITH PATIENT. RELAYED INFO. SHE IS DRIVING SO SHE WILL CALL BACK TO [facility] F/U APPT FOR 8/1 OR 8/5. ALL QUESTIONS ANSWERED.

## 2024-07-15 NOTE — PROGRESS NOTES
7/8/2024     Reason for visit:  Right ankle pain    History of Present Illness:  Patient is a 59-year-old female who presents for evaluation.  She reports 3 months of right ankle pain.  The pain is mostly at the lateral aspect of the ankle.  She cannot recall a specific injury.  The pain is made worse with weightbearing.  Has not improved.    Medical History:  Past Medical History:   Diagnosis Date    ADHD (attention deficit hyperactivity disorder) 2010    ADD    Allergic rhinitis 2000    Itching    Anxiety     Back pain     Breast cancer ([facility]) 2020    right    Breast Cancer - right breast ([facility])     Dr. Modi: pN0 January 2021: ER/MI+, HER2 Neg :  Aromatase Inhibitor tx (will need DEXA)    History of therapeutic radiation     Hypercholesteremia     Obstructive sleep apnea (adult) (pediatric)     APAP 10-18 (ave 12 cwp)    TANYA on CPAP     Osteoarthritis 2022    Knees    PONV (postoperative nausea and vomiting)     Restless leg     Sleep apnea     uses cpap      Past Surgical History:   Procedure Laterality Date    ACHILLES TENDON SURGERY Right     BREAST BIOPSY Right 01/26/2021    RIGHT SENTINEL LYMPH NODE BIOPSY performed by Jaclyn Tran DO at [facility] OR    BREAST REDUCTION SURGERY  10/29/2020    BREAST REDUCTION SURGERY Bilateral 10/29/2020    BILATERAL BREAST REDUCTION AND SPY performed by Madison Pruitt MD at [facility] OR    CHOLECYSTECTOMY  1991    COLONOSCOPY N/A 06/11/2020    COLORECTAL CANCER SCREENING, HIGH RISK performed by Antonio Griffin MD at [facility] ENDOSCOPY    HYSTERECTOMY (CERVIX STATUS UNKNOWN)  2002    supracervical hysterectomy Uzma Bedoya    KNEE ARTHROPLASTY  2022    Root Repair    KNEE ARTHROSCOPY Left 07/28/2022    LEFT KNEE ARTHROSCOPY, MEDIAL MENISCUS, ROOT REPAIR, SYNOVECTOMY, CHONDROPLASTY performed by Wicho Arroyo MD at [facility] OR    TONSILLECTOMY  1969    TOTAL KNEE ARTHROPLASTY Left 11/15/2023    LEFT KNEE TOTAL KNEE ARTHROPLASTY ROBOTIC performed by Carlos Arroyo MD at [facility] OR

## 2024-08-12 ENCOUNTER — APPOINTMENT (OUTPATIENT)
Dept: ULTRASOUND IMAGING | Age: 59
End: 2024-08-12
Payer: COMMERCIAL

## 2024-08-12 ENCOUNTER — HOSPITAL ENCOUNTER (OUTPATIENT)
Dept: MAMMOGRAPHY | Age: 59
Discharge: HOME | End: 2024-08-12
Payer: COMMERCIAL

## 2024-08-12 DIAGNOSIS — C50.911 MALIGNANT NEOPLASM OF RIGHT FEMALE BREAST, UNSPECIFIED ESTROGEN RECEPTOR STATUS, UNSPECIFIED SITE OF BREAST (HCC): ICD-10-CM

## 2024-08-12 PROCEDURE — G0279 TOMOSYNTHESIS, MAMMO: HCPCS

## 2024-09-29 DIAGNOSIS — E78.00 HYPERCHOLESTEREMIA: ICD-10-CM

## 2024-09-29 DIAGNOSIS — M17.12 PRIMARY OSTEOARTHRITIS OF LEFT KNEE: ICD-10-CM

## 2024-09-30 RX ORDER — CELECOXIB 200 MG/1
200 CAPSULE ORAL DAILY
Qty: 60 CAPSULE | Refills: 0 | Status: SHIPPED | OUTPATIENT
Start: 2024-09-30

## 2024-09-30 NOTE — TELEPHONE ENCOUNTER
Medication:   Requested Prescriptions     Pending Prescriptions Disp Refills    celecoxib (CELEBREX) 200 MG capsule [Pharmacy Med Name: CELECOXIB 200MG CAPS] 60 capsule 0     Sig: TAKE ONE CAPSULE BY MOUTH ONCE A DAY     Last Filled:  06/07/2024    Last appt: 6/24/2024   Next appt: Visit date not found    Last OARRS:        No data to display

## 2024-10-04 ENCOUNTER — TELEPHONE (OUTPATIENT)
Dept: PULMONOLOGY | Age: 59
End: 2024-10-04

## 2024-10-04 NOTE — TELEPHONE ENCOUNTER
Patient called stating she received a message from Alix regarding Inspire.  Please call patient back.

## 2024-10-08 ENCOUNTER — OFFICE VISIT (OUTPATIENT)
Dept: PULMONOLOGY | Age: 59
End: 2024-10-08
Payer: COMMERCIAL

## 2024-10-08 VITALS
TEMPERATURE: 98.1 F | OXYGEN SATURATION: 93 % | WEIGHT: 196.5 LBS | BODY MASS INDEX: 30.84 KG/M2 | HEART RATE: 98 BPM | DIASTOLIC BLOOD PRESSURE: 81 MMHG | SYSTOLIC BLOOD PRESSURE: 127 MMHG | RESPIRATION RATE: 16 BRPM | HEIGHT: 67 IN

## 2024-10-08 DIAGNOSIS — G25.81 RLS (RESTLESS LEGS SYNDROME): ICD-10-CM

## 2024-10-08 DIAGNOSIS — G47.33 OBSTRUCTIVE SLEEP APNEA: Primary | ICD-10-CM

## 2024-10-08 DIAGNOSIS — E66.811 OBESITY (BMI 30.0-34.9): ICD-10-CM

## 2024-10-08 PROCEDURE — 99215 OFFICE O/P EST HI 40 MIN: CPT | Performed by: INTERNAL MEDICINE

## 2024-10-08 RX ORDER — VENLAFAXINE HYDROCHLORIDE 37.5 MG/1
37.5 CAPSULE, EXTENDED RELEASE ORAL DAILY
Qty: 30 CAPSULE | Refills: 3 | Status: SHIPPED | OUTPATIENT
Start: 2024-10-08

## 2024-10-08 RX ORDER — MULTIVITAMIN WITH IRON
1 TABLET ORAL DAILY
COMMUNITY

## 2024-10-08 RX ORDER — GABAPENTIN 100 MG/1
CAPSULE ORAL
Qty: 90 CAPSULE | Refills: 1 | Status: SHIPPED | OUTPATIENT
Start: 2024-10-08 | End: 2024-11-06

## 2024-10-08 ASSESSMENT — SLEEP AND FATIGUE QUESTIONNAIRES
HOW LIKELY ARE YOU TO NOD OFF OR FALL ASLEEP WHILE SITTING AND TALKING TO SOMEONE: WOULD NEVER DOZE
HOW LIKELY ARE YOU TO NOD OFF OR FALL ASLEEP WHILE LYING DOWN TO REST IN THE AFTERNOON WHEN CIRCUMSTANCES PERMIT: SLIGHT CHANCE OF DOZING
HOW LIKELY ARE YOU TO NOD OFF OR FALL ASLEEP WHILE SITTING AND READING: SLIGHT CHANCE OF DOZING
HOW LIKELY ARE YOU TO NOD OFF OR FALL ASLEEP IN A CAR, WHILE STOPPED FOR A FEW MINUTES IN TRAFFIC: WOULD NEVER DOZE
HOW LIKELY ARE YOU TO NOD OFF OR FALL ASLEEP WHEN YOU ARE A PASSENGER IN A CAR FOR AN HOUR WITHOUT A BREAK: MODERATE CHANCE OF DOZING
HOW LIKELY ARE YOU TO NOD OFF OR FALL ASLEEP WHILE SITTING QUIETLY AFTER LUNCH WITHOUT ALCOHOL: WOULD NEVER DOZE
ESS TOTAL SCORE: 6
HOW LIKELY ARE YOU TO NOD OFF OR FALL ASLEEP WHILE WATCHING TV: SLIGHT CHANCE OF DOZING

## 2024-10-08 NOTE — PROGRESS NOTES
[facility] Sleep Medicine       CC/REFERRING PROVIDER:  Patient is being seen at the request of the patient Alix Arce patient patient for a consultation for Inspire hypoglossal nerve stimulation for TANYA     PRESENTING HPI 10/8/24 : This is a 59-year-old female who has known TANYA and severe RLS, currently treated with CPAP.  In the past she benefited tremendously from CPAP, but more recently has felt that her sleep quality has deteriorated.  She is falling asleep earlier in the evening than she would like, she is waking up earlier in the morning than she would like, she is struggling to remain asleep through the night.  She does not feel as refreshed and restored as she did previously.  She is currently moving and building a home in the [facility].   She goes to bed at 9 PM nightly, falls asleep quickly and wakes up at 5 AM without an alarm.  In between, she is up after 3 or 4 hours and then approximately every half an hour after that she finds herself awake.   She has severe RLS treated with pramipexole 1.5 mg.  She clearly benefits from the pramipexole, although her leg symptoms are becoming more severe and occurring earlier in the evening.  She has to take the pramipexole around 6 PM where she runs into trouble.  She recently went to a concert and was unable to sit in her seat throughout the event.  She has tried potassium, magnesium, different modes of exercise, hydration and has not had much success  She was previously treated with carbidopa levodopa as well but she is now just on pramipexole.  She has never tried gabapentin.        10/8/2024    10:35 AM 2/7/2024     8:41 AM 2/1/2023    11:59 AM 11/3/2022     8:41 AM 12/11/2019     8:26 AM 12/6/2018     8:31 AM 12/6/2017     8:07 AM   Sleep Medicine   Sitting and reading 1 0 1 0 1 1 0   Watching TV 1 0 0 0 1 1 1   Sitting, inactive in a public place (e.g. a theatre or a meeting) 1 0 0 0 0 0 0   As a passenger in a car for an hour without a break 2 1 0 0 1 2

## 2024-10-08 NOTE — PROGRESS NOTES
MA Communication:  The following orders are received by verbal communication from Stef Kimble MD        Orders include:    5 week follow up

## 2024-10-08 NOTE — PATIENT INSTRUCTIONS
Start gabapentin with current dose mirapex.    Once you are taking 200 mg gabapentin, try dropping to 1/2 dose Mirapex     Once you are on 300 mg gabapentin, you can try dropping off mirapex, but you may need some mirapex for now until we get you off effexor and on the right dose of gapapentin.    Light box therapy, about 2 hours before desired bedtime 10,000 Lux    For Patients with Obstructive Sleep Apnea:  Never drive a car or operate a motorized vehicle while drowsy or sleepy.  Maintaining an optimal weight can help limit the severity of sleep apnea.  Treating sleep apnea effectively may help reduce the risk of heart disease, stroke and type II diabetes.

## 2024-11-20 ENCOUNTER — OFFICE VISIT (OUTPATIENT)
Dept: PULMONOLOGY | Age: 59
End: 2024-11-20
Payer: COMMERCIAL

## 2024-11-20 VITALS
BODY MASS INDEX: 32.02 KG/M2 | HEART RATE: 108 BPM | DIASTOLIC BLOOD PRESSURE: 77 MMHG | OXYGEN SATURATION: 92 % | SYSTOLIC BLOOD PRESSURE: 108 MMHG | TEMPERATURE: 97.8 F | WEIGHT: 204 LBS | RESPIRATION RATE: 16 BRPM | HEIGHT: 67 IN

## 2024-11-20 DIAGNOSIS — G25.81 RLS (RESTLESS LEGS SYNDROME): Primary | ICD-10-CM

## 2024-11-20 DIAGNOSIS — G47.33 OBSTRUCTIVE SLEEP APNEA: ICD-10-CM

## 2024-11-20 PROCEDURE — 99214 OFFICE O/P EST MOD 30 MIN: CPT | Performed by: INTERNAL MEDICINE

## 2024-11-20 RX ORDER — GABAPENTIN 100 MG/1
CAPSULE ORAL
Qty: 90 CAPSULE | Refills: 1 | Status: SHIPPED | OUTPATIENT
Start: 2024-11-20 | End: 2024-12-20

## 2024-11-20 RX ORDER — GABAPENTIN 300 MG/1
CAPSULE ORAL
Qty: 30 CAPSULE | Refills: 2 | Status: SHIPPED | OUTPATIENT
Start: 2024-11-20 | End: 2025-01-15

## 2024-11-20 NOTE — PROGRESS NOTES
[facility] Sleep Medicine       CC/REFERRING PROVIDER:  Patient is being seen at the request of the patient Alix Arce patient patient for a consultation for Inspire hypoglossal nerve stimulation for TANYA     Interval History 11/20/24  -patient is struggled with the transition from pramipexole to gabapentin.  She says approximately 4 out of 7 nights 300 mg of gabapentin is sufficient to keep the legs calm enough for her to fall asleep.  However on the other nights she still requires pramipexole.  She is taking 300 mg of gabapentin at approximately 6:30 PM.  She thinks it controls her leg symptoms pretty well until she gets into bed.  Patient has successfully tapered venlafaxine to off.  She thinks that stopping venlafaxine has not caused her to have significant mood disturbance, however she does feel that her absence of sleep is causing her to not feel well in general.    PRESENTING HPI 10/8/24 : This is a 59-year-old female who has known TANYA and severe RLS, currently treated with CPAP.  In the past she benefited tremendously from CPAP, but more recently has felt that her sleep quality has deteriorated.  She is falling asleep earlier in the evening than she would like, she is waking up earlier in the morning than she would like, she is struggling to remain asleep through the night.  She does not feel as refreshed and restored as she did previously.  She is currently moving and building a home in the [facility].   She goes to bed at 9 PM nightly, falls asleep quickly and wakes up at 5 AM without an alarm.  In between, she is up after 3 or 4 hours and then approximately every half an hour after that she finds herself awake.   She has severe RLS treated with pramipexole 1.5 mg.  She clearly benefits from the pramipexole, although her leg symptoms are becoming more severe and occurring earlier in the evening.  She has to take the pramipexole around 6 PM where she runs into trouble.  She recently went to a [employer]

## 2024-11-20 NOTE — PATIENT INSTRUCTIONS
Marissa Harmon [facility]      Please remember to bring a list of medications and any CPAP or BiPAP machines to your next appointment with the office.     Out of respect for other patients and providers, we ask that you arrive no later than your scheduled appointment time.  If you arrive later than your appointment time, you may be asked to reschedule your appointment.     Late cancellations result in other patients being unable to utilize the appointment slot to see their physician.  Please avoid cancelling your appointment less than 1 week prior to the appointment date.  Patients with multiple missed appointments within 2 years may be dismissed from the practice.     In the next few weeks, you will be receiving a survey from [facility] regarding your visit today.  Your input is valuable to us & surveys are regularly reviewed by [facility] leadership.  It is very important to us that our patients receive excellent care.  If your experience was excellent, please let us know!  If your experience was not a good one, please tell us so we can make needed corrections. We hope you are comfortable recommending us to others for their healthcare needs.     We thank you for choosing [employer]!

## 2024-11-20 NOTE — PROGRESS NOTES
MA Communication:  The following orders are received by verbal communication from Stef Kimble MD        Orders include:        Follow up 4 weeks VV

## 2024-11-25 DIAGNOSIS — E78.00 HYPERCHOLESTEREMIA: ICD-10-CM

## 2024-11-25 DIAGNOSIS — M17.12 PRIMARY OSTEOARTHRITIS OF LEFT KNEE: ICD-10-CM

## 2024-11-25 RX ORDER — ROSUVASTATIN CALCIUM 10 MG/1
10 TABLET, COATED ORAL NIGHTLY
Qty: 90 TABLET | Refills: 1 | Status: SHIPPED | OUTPATIENT
Start: 2024-11-25

## 2024-11-25 RX ORDER — CELECOXIB 200 MG/1
200 CAPSULE ORAL DAILY
Qty: 60 CAPSULE | Refills: 0 | Status: SHIPPED | OUTPATIENT
Start: 2024-11-25

## 2024-11-25 NOTE — TELEPHONE ENCOUNTER
Medication:   Requested Prescriptions     Pending Prescriptions Disp Refills    celecoxib (CELEBREX) 200 MG capsule [Pharmacy Med Name: CELECOXIB 200MG CAPS] 60 capsule 0     Sig: TAKE ONE CAPSULE BY MOUTH ONCE A DAY    rosuvastatin (CRESTOR) 10 MG tablet [Pharmacy Med Name: ROSUVASTATIN CALCIUM 10MG TABS] 90 tablet 1     Sig: TAKE ONE TABLET BY MOUTH EVERY NIGHT     Last Filled:  06/07/2024, 09/30/2024    Last appt: 6/24/2024   Next appt: Visit date not found    Last OARRS:        No data to display              Patient needs refill on medication, patient stated that she will need to find a different PCP because its to far

## 2024-12-06 ENCOUNTER — TELEMEDICINE (OUTPATIENT)
Age: 59
End: 2024-12-06
Payer: COMMERCIAL

## 2024-12-06 DIAGNOSIS — G25.81 RLS (RESTLESS LEGS SYNDROME): ICD-10-CM

## 2024-12-06 DIAGNOSIS — G47.33 OBSTRUCTIVE SLEEP APNEA: Primary | ICD-10-CM

## 2024-12-06 PROCEDURE — 99214 OFFICE O/P EST MOD 30 MIN: CPT | Performed by: INTERNAL MEDICINE

## 2024-12-06 RX ORDER — GABAPENTIN 300 MG/1
CAPSULE ORAL
Qty: 90 CAPSULE | Refills: 1 | Status: SHIPPED | OUTPATIENT
Start: 2024-12-06 | End: 2025-01-31

## 2024-12-06 ASSESSMENT — SLEEP AND FATIGUE QUESTIONNAIRES
HOW LIKELY ARE YOU TO NOD OFF OR FALL ASLEEP WHILE WATCHING TV: WOULD NEVER DOZE
HOW LIKELY ARE YOU TO NOD OFF OR FALL ASLEEP WHILE SITTING AND READING: WOULD NEVER DOZE
HOW LIKELY ARE YOU TO NOD OFF OR FALL ASLEEP WHILE SITTING INACTIVE IN A PUBLIC PLACE: WOULD NEVER DOZE
HOW LIKELY ARE YOU TO NOD OFF OR FALL ASLEEP WHILE SITTING AND TALKING TO SOMEONE: WOULD NEVER DOZE
ESS TOTAL SCORE: 1
HOW LIKELY ARE YOU TO NOD OFF OR FALL ASLEEP IN A CAR, WHILE STOPPED FOR A FEW MINUTES IN TRAFFIC: WOULD NEVER DOZE
HOW LIKELY ARE YOU TO NOD OFF OR FALL ASLEEP WHILE SITTING QUIETLY AFTER LUNCH WITHOUT ALCOHOL: WOULD NEVER DOZE
HOW LIKELY ARE YOU TO NOD OFF OR FALL ASLEEP WHEN YOU ARE A PASSENGER IN A CAR FOR AN HOUR WITHOUT A BREAK: SLIGHT CHANCE OF DOZING
HOW LIKELY ARE YOU TO NOD OFF OR FALL ASLEEP WHILE LYING DOWN TO REST IN THE AFTERNOON WHEN CIRCUMSTANCES PERMIT: WOULD NEVER DOZE

## 2024-12-06 NOTE — PROGRESS NOTES
[facility] Sleep Medicine       CC/REFERRING PROVIDER:  Patient is being seen at the request of the patient Alix Arce patient patient for a consultation for Inspire hypoglossal nerve stimulation for TANYA     Interval History 12/6/24  - takes gabapentin 300 mg just at bedtime, RLS in evenings is not causing much problem.  She has completely eliminated pramipexole.  Regarding gabapentin, single dose gets her through the entire night.  She did not have to add another dose of gabapentin.  Unfortunately, while she was able to successfully come off Effexor prior to her last visit, she is now noticing more trouble with sadness and anxiety.  These are symptoms that are similar to prior to her starting Effexor many years ago.  She says that she used Wellbutrin for about 10 years but developed some of the anxiety and depression symptoms despite being on Wellbutrin.  She specifically denies suicidal ideation or homicidal ideation.        Interval History 11/20/24  -patient is struggled with the transition from pramipexole to gabapentin.  She says approximately 4 out of 7 nights 300 mg of gabapentin is sufficient to keep the legs calm enough for her to fall asleep.  However on the other nights she still requires pramipexole.  She is taking 300 mg of gabapentin at approximately 6:30 PM.  She thinks it controls her leg symptoms pretty well until she gets into bed.  Patient has successfully tapered venlafaxine to off.  She thinks that stopping venlafaxine has not caused her to have significant mood disturbance, however she does feel that her absence of sleep is causing her to not feel well in general.    PRESENTING HPI 10/8/24 : This is a 59-year-old female who has known TANYA and severe RLS, currently treated with CPAP.  In the past she benefited tremendously from CPAP, but more recently has felt that her sleep quality has deteriorated.  She is falling asleep earlier in the evening than she would like, she is waking up earlier in

## 2024-12-06 NOTE — PROGRESS NOTES
MA Communication:  The following orders are received by verbal communication from Stef Kimble MD        Orders include:        Follow up 6 weeks RLS

## 2024-12-06 NOTE — PATIENT INSTRUCTIONS
Try starting low dose effexor 37.5 in the morning for 3- 4 days.  If leg symptoms no worse, increase to 75 mg daily in the morning.   I would stay at this dose for 7-10 days.  If leg symptoms are okay and noticing some improvement in mood, stay at this dose.   If leg symptoms are okay but mood not better, try going up again to 3 37.5 mg capsules.      If you have significant worsening in mood or any thoughts of harming yourself or someone else, seek immediate help.      See Lamin in January.  See regular physician as well.       Please remember to bring a list of medications and any CPAP or BiPAP machines to your next appointment with the office.     Out of respect for other patients and providers, we ask that you arrive no later than your scheduled appointment time.  If you arrive later than your appointment time, you may be asked to reschedule your appointment.     Late cancellations result in other patients being unable to utilize the appointment slot to see their physician.  Please avoid cancelling your appointment less than 1 week prior to the appointment date.  Patients with multiple missed appointments within 2 years may be dismissed from the practice.     In the next few weeks, you will be receiving a survey from [facility] regarding your visit today.  Your input is valuable to us & surveys are regularly reviewed by [facility] leadership.  It is very important to us that our patients receive excellent care.  If your experience was excellent, please let us know!  If your experience was not a good one, please tell us so we can make needed corrections. We hope you are comfortable recommending us to others for their healthcare needs.     We thank you for choosing [employer]!     Alix

## 2024-12-17 DIAGNOSIS — G25.81 RLS (RESTLESS LEGS SYNDROME): ICD-10-CM

## 2024-12-17 RX ORDER — GABAPENTIN 300 MG/1
CAPSULE ORAL
Qty: 30 CAPSULE | OUTPATIENT
Start: 2024-12-17

## 2024-12-17 RX ORDER — PRAMIPEXOLE DIHYDROCHLORIDE 1.5 MG/1
1.5 TABLET ORAL NIGHTLY
Qty: 30 TABLET | Refills: 0 | Status: SHIPPED | OUTPATIENT
Start: 2024-12-17

## 2024-12-17 NOTE — TELEPHONE ENCOUNTER
Medication:   Requested Prescriptions     Pending Prescriptions Disp Refills    pramipexole (MIRAPEX) 1.5 MG tablet [Pharmacy Med Name: PRAMIPEXOLE DIHYDROCHLOR 1.5MG TABS] 90 tablet 1     Sig: TAKE ONE TABLET BY MOUTH EVERY NIGHT     Last Filled:  6.7.24    Last appt: 6/24/2024   Next appt: Visit date not found    Last OARRS:        No data to display

## 2024-12-17 NOTE — TELEPHONE ENCOUNTER
Patient due for follow up, has a New patient appointment scheduled with another provider. 30 days sent per office policy.

## 2024-12-22 SDOH — HEALTH STABILITY: PHYSICAL HEALTH: ON AVERAGE, HOW MANY DAYS PER WEEK DO YOU ENGAGE IN MODERATE TO STRENUOUS EXERCISE (LIKE A BRISK WALK)?: 2 DAYS

## 2024-12-22 SDOH — HEALTH STABILITY: PHYSICAL HEALTH: ON AVERAGE, HOW MANY MINUTES DO YOU ENGAGE IN EXERCISE AT THIS LEVEL?: 20 MIN

## 2024-12-30 ENCOUNTER — OFFICE VISIT (OUTPATIENT)
Dept: FAMILY MEDICINE CLINIC | Age: 59
End: 2024-12-30

## 2024-12-30 VITALS
TEMPERATURE: 98.3 F | BODY MASS INDEX: 31.71 KG/M2 | OXYGEN SATURATION: 95 % | SYSTOLIC BLOOD PRESSURE: 110 MMHG | HEIGHT: 67 IN | DIASTOLIC BLOOD PRESSURE: 70 MMHG | HEART RATE: 84 BPM | WEIGHT: 202 LBS

## 2024-12-30 DIAGNOSIS — L29.9 GENERALIZED PRURITUS: ICD-10-CM

## 2024-12-30 DIAGNOSIS — G25.81 RLS (RESTLESS LEGS SYNDROME): ICD-10-CM

## 2024-12-30 DIAGNOSIS — F33.1 MAJOR DEPRESSIVE DISORDER, RECURRENT, MODERATE (HCC): Primary | ICD-10-CM

## 2024-12-30 DIAGNOSIS — Z85.3 HISTORY OF RIGHT BREAST CANCER: ICD-10-CM

## 2024-12-30 DIAGNOSIS — L43.8 ORAL LICHEN PLANUS: ICD-10-CM

## 2024-12-30 DIAGNOSIS — G47.33 OSA ON CPAP: ICD-10-CM

## 2024-12-30 DIAGNOSIS — E78.00 HYPERCHOLESTEREMIA: Chronic | ICD-10-CM

## 2024-12-30 RX ORDER — GABAPENTIN 300 MG/1
300 CAPSULE ORAL
Qty: 90 CAPSULE | Refills: 1 | Status: SHIPPED | OUTPATIENT
Start: 2024-12-30 | End: 2025-06-28

## 2024-12-30 RX ORDER — SERTRALINE HYDROCHLORIDE 100 MG/1
100 TABLET, FILM COATED ORAL DAILY
Qty: 30 TABLET | Refills: 1 | Status: SHIPPED | OUTPATIENT
Start: 2024-12-30

## 2024-12-30 RX ORDER — GABAPENTIN 100 MG/1
100 CAPSULE ORAL NIGHTLY PRN
Qty: 90 CAPSULE | Refills: 1 | Status: SHIPPED | OUTPATIENT
Start: 2024-12-30 | End: 2025-06-28

## 2024-12-30 RX ORDER — COVID-19 ANTIGEN TEST
1 KIT MISCELLANEOUS DAILY
COMMUNITY

## 2024-12-30 RX ORDER — VENLAFAXINE HYDROCHLORIDE 37.5 MG/1
37.5 CAPSULE, EXTENDED RELEASE ORAL DAILY
COMMUNITY
Start: 2024-12-30 | End: 2025-01-13

## 2024-12-30 RX ORDER — LORATADINE 10 MG/1
10 TABLET ORAL DAILY
COMMUNITY
Start: 2024-12-30

## 2024-12-30 RX ORDER — VENLAFAXINE HYDROCHLORIDE 75 MG/1
75 CAPSULE, EXTENDED RELEASE ORAL DAILY
COMMUNITY
End: 2024-12-30

## 2024-12-30 ASSESSMENT — PATIENT HEALTH QUESTIONNAIRE - PHQ9
2. FEELING DOWN, DEPRESSED OR HOPELESS: SEVERAL DAYS
5. POOR APPETITE OR OVEREATING: SEVERAL DAYS
SUM OF ALL RESPONSES TO PHQ QUESTIONS 1-9: 5
8. MOVING OR SPEAKING SO SLOWLY THAT OTHER PEOPLE COULD HAVE NOTICED. OR THE OPPOSITE, BEING SO FIGETY OR RESTLESS THAT YOU HAVE BEEN MOVING AROUND A LOT MORE THAN USUAL: SEVERAL DAYS
SUM OF ALL RESPONSES TO PHQ QUESTIONS 1-9: 5
7. TROUBLE CONCENTRATING ON THINGS, SUCH AS READING THE NEWSPAPER OR WATCHING TELEVISION: SEVERAL DAYS
SUM OF ALL RESPONSES TO PHQ QUESTIONS 1-9: 5
10. IF YOU CHECKED OFF ANY PROBLEMS, HOW DIFFICULT HAVE THESE PROBLEMS MADE IT FOR YOU TO DO YOUR WORK, TAKE CARE OF THINGS AT HOME, OR GET ALONG WITH OTHER PEOPLE: SOMEWHAT DIFFICULT
3. TROUBLE FALLING OR STAYING ASLEEP: NOT AT ALL
1. LITTLE INTEREST OR PLEASURE IN DOING THINGS: SEVERAL DAYS
9. THOUGHTS THAT YOU WOULD BE BETTER OFF DEAD, OR OF HURTING YOURSELF: NOT AT ALL
6. FEELING BAD ABOUT YOURSELF - OR THAT YOU ARE A FAILURE OR HAVE LET YOURSELF OR YOUR FAMILY DOWN: NOT AT ALL
SUM OF ALL RESPONSES TO PHQ9 QUESTIONS 1 & 2: 2
SUM OF ALL RESPONSES TO PHQ QUESTIONS 1-9: 5
4. FEELING TIRED OR HAVING LITTLE ENERGY: NOT AT ALL

## 2024-12-30 ASSESSMENT — ANXIETY QUESTIONNAIRES
2. NOT BEING ABLE TO STOP OR CONTROL WORRYING: SEVERAL DAYS
4. TROUBLE RELAXING: NOT AT ALL
GAD7 TOTAL SCORE: 5
IF YOU CHECKED OFF ANY PROBLEMS ON THIS QUESTIONNAIRE, HOW DIFFICULT HAVE THESE PROBLEMS MADE IT FOR YOU TO DO YOUR WORK, TAKE CARE OF THINGS AT HOME, OR GET ALONG WITH OTHER PEOPLE: SOMEWHAT DIFFICULT
5. BEING SO RESTLESS THAT IT IS HARD TO SIT STILL: SEVERAL DAYS
6. BECOMING EASILY ANNOYED OR IRRITABLE: SEVERAL DAYS
7. FEELING AFRAID AS IF SOMETHING AWFUL MIGHT HAPPEN: NOT AT ALL
3. WORRYING TOO MUCH ABOUT DIFFERENT THINGS: SEVERAL DAYS
1. FEELING NERVOUS, ANXIOUS, OR ON EDGE: SEVERAL DAYS

## 2024-12-30 NOTE — PATIENT INSTRUCTIONS
Zoloft (sertraline): Take 1/2 tablet daily for 2 weeks, then increase to 1 tablet daily.    [facility]  [address]  [place] [address]  [phone]

## 2024-12-30 NOTE — PROGRESS NOTES
years   Vaping Use    Vaping status: Never Used   Substance Use Topics    Alcohol use: Not Currently     Comment: socially - beer    Drug use: Not Currently     Types: Marijuana (Weed)     Comment: Gummies     Family History   Problem Relation Age of Onset    Other Mother     High Cholesterol Mother     Obesity Mother     Heart Attack Father 51         at 78    Hypertension Father     Other Brother         bipolar    Mental Illness Brother         Bipolar    Colon Cancer Brother      No Known Allergies  Outpatient Medications Marked as Taking for the 24 encounter (Office Visit) with No Pate DO   Medication Sig Dispense Refill    Naproxen Sodium 220 MG CAPS Take 1 caplet by mouth daily      loratadine (CLARITIN) 10 MG tablet Take 1 tablet by mouth daily      venlafaxine (EFFEXOR XR) 37.5 MG extended release capsule Take 1 capsule by mouth daily      sertraline (ZOLOFT) 100 MG tablet Take 1 tablet by mouth daily 30 tablet 1    gabapentin (NEURONTIN) 300 MG capsule Take 1 capsule by mouth nightly for 180 days. 90 capsule 1    gabapentin (NEURONTIN) 100 MG capsule Take 1 capsule by mouth nightly as needed (RLS) for up to 180 days. Take with 300 mg capsules for a total of 400 mg. 90 capsule 1    gabapentin (NEURONTIN) 300 MG capsule 1 capsule around 6:30 pm 90 capsule 1    rosuvastatin (CRESTOR) 10 MG tablet TAKE ONE TABLET BY MOUTH EVERY NIGHT 90 tablet 1    Multiple Vitamin (MULTIVITAMIN) TABS tablet Take 1 tablet by mouth daily            No Smith DO  Family Medicine  2024

## 2025-01-27 ENCOUNTER — TELEPHONE (OUTPATIENT)
Dept: BEHAVIORAL/MENTAL HEALTH | Age: 60
End: 2025-01-27

## 2025-01-27 ENCOUNTER — TELEMEDICINE (OUTPATIENT)
Dept: PSYCHOLOGY | Age: 60
End: 2025-01-27

## 2025-01-27 DIAGNOSIS — F32.1 MDD (MAJOR DEPRESSIVE DISORDER), SINGLE EPISODE, MODERATE (HCC): Primary | ICD-10-CM

## 2025-01-27 PROCEDURE — 90791 PSYCH DIAGNOSTIC EVALUATION: CPT | Performed by: PSYCHOLOGIST

## 2025-01-27 RX ORDER — SERTRALINE HYDROCHLORIDE 25 MG/1
25 TABLET, FILM COATED ORAL DAILY
Qty: 30 TABLET | Refills: 0 | Status: SHIPPED | OUTPATIENT
Start: 2025-01-27

## 2025-01-27 RX ORDER — SERTRALINE HYDROCHLORIDE 25 MG/1
25 TABLET, FILM COATED ORAL DAILY
Qty: 30 TABLET | Refills: 0 | Status: SHIPPED | OUTPATIENT
Start: 2025-01-27 | End: 2025-01-27 | Stop reason: SDUPTHER

## 2025-01-27 NOTE — PROGRESS NOTES
Behavioral Health Consultation  Aubrie Huff PsyD  Clinical Psychologist  1/27/2025    Name: Iris Gomez  YOB: 1965  PCP: No Pate DO     Time spent with Iris: 30 minutes  This is her first [facility] appointment.  Reason for Consult:  depression           Iris Gomez, was evaluated through a synchronous (real-time) audio-video encounter. The patient (or guardian if applicable) is aware that this is a billable service, which includes applicable co-pays. This Virtual Visit was conducted with patient's (and/or legal guardian's) consent. Patient identification was verified, and a caregiver was present when appropriate.   The patient was located at Home: [address] [place] [address]  Provider was located at Other: [place]  Confirm you are appropriately licensed, registered, or certified to deliver care in the state where the patient is located as indicated above. If you are not or unsure, please re-schedule the visit: Yes, I confirm.      Total time spent for this encounter:  30 min    --Aubrie Huff PSYD on 1/27/2025 at 8:50 AM    An electronic signature was used to authenticate this note.         S:  Iris is a 60 y.o. female seen per No Pate DO addressing depression. She describes symptoms consistent with diagnosis of MDD, single episode, moderate. Symptoms have been present since around age 35 and have persisted at some level of intensity in the background. Symptoms were recently exacerbated in Nov of 2024 after she stopped taking venlafaxine. She reports hx of trials of different antidepressants over the years.  Started sertraline with PCP shortly after and reports noticing some improvements. Current ongoing sx are listed below.  Stressors that have impacted mood include living with her niece while waiting for her new home to be ready to move into, recently retiring, her mother has dementia and she is the POA, and anxiety around the current political

## 2025-01-27 NOTE — TELEPHONE ENCOUNTER
Hi Dr. Smith,    Meet with Iris mckeon for depression management. She reports that the Zoloft has helped some with symptoms but she asks if the dose can be increased to see if it will help more with her overall mood and anhedonia. What do you think?    Thanks!  Dr. Huff

## 2025-01-27 NOTE — TELEPHONE ENCOUNTER
Yes, that is reasonable.   Team, please call patient to let her know I do think increasing her zoloft dose is reasonable to do at this time. I am sending in 25 mg tablets. Take BOTH the 25 mg tablet and 100 mg tablets once daily for a total of 125 mg daily. Keep appointment as scheduled.

## 2025-02-12 ENCOUNTER — TELEMEDICINE (OUTPATIENT)
Dept: PSYCHOLOGY | Age: 60
End: 2025-02-12

## 2025-02-12 DIAGNOSIS — F32.1 MDD (MAJOR DEPRESSIVE DISORDER), SINGLE EPISODE, MODERATE (HCC): Primary | ICD-10-CM

## 2025-02-12 PROCEDURE — 90832 PSYTX W PT 30 MINUTES: CPT | Performed by: PSYCHOLOGIST

## 2025-02-12 NOTE — PROGRESS NOTES
years (16.8 ttl pk-yrs)     Types: Cigarettes     Start date: 1985     Quit date: 2018     Years since quittin.6    Smokeless tobacco: Never    Tobacco comments:     10 year hiatus from smoking during those years   Vaping Use    Vaping status: Never Used   Substance and Sexual Activity    Alcohol use: Not Currently     Comment: socially - beer    Drug use: Not Currently     Types: Marijuana (Weed)     Comment: Gummies    Sexual activity: Yes     Partners: Female   Other Topics Concern    Not on file   Social History Narrative    Not on file     Social Determinants of Health     Financial Resource Strain: Low Risk  (2023)    Overall Financial Resource Strain (CARDIA)     Difficulty of Paying Living Expenses: Not hard at all   Food Insecurity: Unknown (2024)    Received from [facility], [facility]    Food Insecurities     Worried about running out of food: Not on file     Food Bought: Not on file   Transportation Needs: Unknown (2024)    Received from [facility], [facility]    Transportation     Worried about transportation: Not on file   Physical Activity: Insufficiently Active (2024)    Exercise Vital Sign     Days of Exercise per Week: 2 days     Minutes of Exercise per Session: 20 min   Stress: Not on file   Social Connections: Not on file   Intimate Partner Violence: Unknown (2024)    Received from [facility], [facility]    Interpersonal Safety     Feel physically or emotionally unsafe where currently live: Not on file     Harm by anyone: Not on file     Emotionally Harmed: Not on file   Housing Stability: Unknown (2024)    Received from [facility], [facility]    Housing/Utilities     Worried about losing home: Not on file     Stayed

## 2025-02-25 DIAGNOSIS — F33.1 MAJOR DEPRESSIVE DISORDER, RECURRENT, MODERATE (HCC): ICD-10-CM

## 2025-02-25 RX ORDER — SERTRALINE HYDROCHLORIDE 100 MG/1
100 TABLET, FILM COATED ORAL DAILY
Qty: 30 TABLET | Refills: 1 | Status: SHIPPED | OUTPATIENT
Start: 2025-02-25 | End: 2025-02-27 | Stop reason: SDUPTHER

## 2025-02-25 NOTE — TELEPHONE ENCOUNTER
Refill Request     CONFIRM preferred pharmacy with the patient.    If Mail Order Rx - Pend for 90 day refill.      Last Seen: Last Seen Department: 12/30/2024  Last Seen by PCP: 12/30/2024    Last Written: 12/30/24 #30 - 1 refill     If no future appointment scheduled:  Review the last OV with PCP and review information for follow-up visit,  Route STAFF MESSAGE with patient name to the  Pool for scheduling with the following information:            -  Timing of next visit           -  Visit type ie Physical, OV, etc           -  Diagnoses/Reason ie. COPD, HTN - Do not use MEDICATION, Follow-up or CHECK UP - Give reason for visit      Next Appointment:   Future Appointments   Date Time Provider Department Center   2/26/2025  2:30 PM Aubrie Huff PSYD [facility]   3/12/2025  8:30 AM No Pate DO [facility]       Message sent to  to schedule appt with patient?  NO      Requested Prescriptions     Pending Prescriptions Disp Refills    sertraline (ZOLOFT) 100 MG tablet 30 tablet 1     Sig: Take 1 tablet by mouth daily

## 2025-02-27 DIAGNOSIS — E78.00 HYPERCHOLESTEREMIA: ICD-10-CM

## 2025-02-27 DIAGNOSIS — F33.1 MAJOR DEPRESSIVE DISORDER, RECURRENT, MODERATE (HCC): ICD-10-CM

## 2025-02-27 DIAGNOSIS — G25.81 RLS (RESTLESS LEGS SYNDROME): ICD-10-CM

## 2025-02-27 RX ORDER — ROSUVASTATIN CALCIUM 10 MG/1
10 TABLET, COATED ORAL NIGHTLY
Qty: 90 TABLET | Refills: 1 | Status: CANCELLED | OUTPATIENT
Start: 2025-02-27

## 2025-02-27 NOTE — TELEPHONE ENCOUNTER
Medication:   Requested Prescriptions     Pending Prescriptions Disp Refills    rosuvastatin (CRESTOR) 10 MG tablet 90 tablet 1     Sig: Take 1 tablet by mouth nightly     Last Filled:  11/25/24    Last appt: 12/30/24  Next appt: Visit date not found    Last Lipid:   Lab Results   Component Value Date/Time    CHOL 220 06/24/2024 02:38 PM    TRIG 236 06/24/2024 02:38 PM    HDL 50 06/24/2024 02:38 PM    HDL 68 12/02/2011 08:35 AM

## 2025-02-27 NOTE — TELEPHONE ENCOUNTER
Refill Request     CONFIRM preferred pharmacy with the patient.    If Mail Order Rx - Pend for 90 day refill.      Last Seen: Last Seen Department: 12/30/2024  Last Seen by PCP: 12/30/2024    Last Written:   Gabapentin-12/30/2024 90 cap 1 refills  Gabapentin-12/30/2024 90 cap 1 refills  Sertraline-2/25/2025 30 tab 1 refills  Sertraline-1/27/2025 30 tab 0 refills    If no future appointment scheduled:  Review the last OV with PCP and review information for follow-up visit,  Route STAFF MESSAGE with patient name to the  Pool for scheduling with the following information:            -  Timing of next visit           -  Visit type ie Physical, OV, etc           -  Diagnoses/Reason ie. COPD, HTN - Do not use MEDICATION, Follow-up or CHECK UP - Give reason for visit      Next Appointment:   Future Appointments   Date Time Provider Department Center   3/12/2025  8:30 AM No Pate DO [facility]       Message sent to  to schedule appt with patient?  NO      Requested Prescriptions     Pending Prescriptions Disp Refills    gabapentin (NEURONTIN) 300 MG capsule 90 capsule 1     Sig: Take 1 capsule by mouth nightly for 180 days.    gabapentin (NEURONTIN) 100 MG capsule 90 capsule 1     Sig: Take 1 capsule by mouth nightly as needed (RLS) for up to 180 days. Take with 300 mg capsules for a total of 400 mg.    sertraline (ZOLOFT) 25 MG tablet 30 tablet 0     Sig: Take 1 tablet by mouth daily Take with 100 mg tablet for a total of 125 mg daily.    sertraline (ZOLOFT) 100 MG tablet 30 tablet 1     Sig: Take 1 tablet by mouth daily

## 2025-02-28 RX ORDER — GABAPENTIN 100 MG/1
100 CAPSULE ORAL NIGHTLY PRN
Qty: 90 CAPSULE | Refills: 1 | OUTPATIENT
Start: 2025-02-28 | End: 2025-08-27

## 2025-02-28 RX ORDER — SERTRALINE HYDROCHLORIDE 100 MG/1
100 TABLET, FILM COATED ORAL DAILY
Qty: 90 TABLET | Refills: 1 | Status: SHIPPED | OUTPATIENT
Start: 2025-02-28 | End: 2025-03-12

## 2025-02-28 RX ORDER — SERTRALINE HYDROCHLORIDE 25 MG/1
25 TABLET, FILM COATED ORAL DAILY
Qty: 90 TABLET | Refills: 1 | Status: SHIPPED | OUTPATIENT
Start: 2025-02-28 | End: 2025-03-12

## 2025-02-28 RX ORDER — ROSUVASTATIN CALCIUM 10 MG/1
10 TABLET, COATED ORAL NIGHTLY
Qty: 90 TABLET | Refills: 1 | Status: SHIPPED | OUTPATIENT
Start: 2025-02-28

## 2025-02-28 RX ORDER — GABAPENTIN 300 MG/1
300 CAPSULE ORAL
Qty: 90 CAPSULE | Refills: 1 | OUTPATIENT
Start: 2025-02-28 | End: 2025-08-27

## 2025-02-28 NOTE — TELEPHONE ENCOUNTER
Sertraline was increased at last visit, do you want to continue two separate prescriptions or is there one tablet she can take for the higher dose?    Needs refills sent ASAP out of crestor & sertraline since increased.

## 2025-02-28 NOTE — TELEPHONE ENCOUNTER
Gabapentin prescriptions still have refills available at pharmacy.  Unfortunately there is not a 125 mg pill of zoloft (sertraline) so will need to be 2 scripts.

## 2025-03-12 ENCOUNTER — OFFICE VISIT (OUTPATIENT)
Dept: FAMILY MEDICINE CLINIC | Age: 60
End: 2025-03-12
Payer: COMMERCIAL

## 2025-03-12 VITALS
HEIGHT: 67 IN | TEMPERATURE: 98.3 F | SYSTOLIC BLOOD PRESSURE: 110 MMHG | BODY MASS INDEX: 31.08 KG/M2 | WEIGHT: 198 LBS | HEART RATE: 86 BPM | OXYGEN SATURATION: 93 % | DIASTOLIC BLOOD PRESSURE: 60 MMHG

## 2025-03-12 DIAGNOSIS — L43.8 ORAL LICHEN PLANUS: ICD-10-CM

## 2025-03-12 DIAGNOSIS — L29.9 GENERALIZED PRURITUS: ICD-10-CM

## 2025-03-12 DIAGNOSIS — Z85.3 HISTORY OF RIGHT BREAST CANCER: ICD-10-CM

## 2025-03-12 DIAGNOSIS — G47.33 OSA ON CPAP: ICD-10-CM

## 2025-03-12 DIAGNOSIS — E78.00 HYPERCHOLESTEREMIA: ICD-10-CM

## 2025-03-12 DIAGNOSIS — J30.9 ALLERGIC RHINITIS, UNSPECIFIED SEASONALITY, UNSPECIFIED TRIGGER: Primary | ICD-10-CM

## 2025-03-12 DIAGNOSIS — F33.1 MAJOR DEPRESSIVE DISORDER, RECURRENT, MODERATE (HCC): ICD-10-CM

## 2025-03-12 DIAGNOSIS — G25.81 RLS (RESTLESS LEGS SYNDROME): ICD-10-CM

## 2025-03-12 PROCEDURE — 99214 OFFICE O/P EST MOD 30 MIN: CPT | Performed by: FAMILY MEDICINE

## 2025-03-12 RX ORDER — GABAPENTIN 300 MG/1
300 CAPSULE ORAL
Qty: 90 CAPSULE | Refills: 1 | Status: SHIPPED | OUTPATIENT
Start: 2025-03-12 | End: 2025-09-08

## 2025-03-12 RX ORDER — CETIRIZINE HYDROCHLORIDE 10 MG/1
10 TABLET ORAL DAILY
Qty: 90 TABLET | Refills: 3 | Status: SHIPPED | OUTPATIENT
Start: 2025-03-12

## 2025-03-12 RX ORDER — GABAPENTIN 100 MG/1
100 CAPSULE ORAL NIGHTLY PRN
Qty: 90 CAPSULE | Refills: 1 | Status: SHIPPED | OUTPATIENT
Start: 2025-03-12 | End: 2025-09-08

## 2025-03-12 SDOH — ECONOMIC STABILITY: FOOD INSECURITY: WITHIN THE PAST 12 MONTHS, THE FOOD YOU BOUGHT JUST DIDN'T LAST AND YOU DIDN'T HAVE MONEY TO GET MORE.: NEVER TRUE

## 2025-03-12 SDOH — ECONOMIC STABILITY: FOOD INSECURITY: WITHIN THE PAST 12 MONTHS, YOU WORRIED THAT YOUR FOOD WOULD RUN OUT BEFORE YOU GOT MONEY TO BUY MORE.: NEVER TRUE

## 2025-03-12 ASSESSMENT — PATIENT HEALTH QUESTIONNAIRE - PHQ9
7. TROUBLE CONCENTRATING ON THINGS, SUCH AS READING THE NEWSPAPER OR WATCHING TELEVISION: NOT AT ALL
SUM OF ALL RESPONSES TO PHQ QUESTIONS 1-9: 3
4. FEELING TIRED OR HAVING LITTLE ENERGY: SEVERAL DAYS
SUM OF ALL RESPONSES TO PHQ QUESTIONS 1-9: 3
SUM OF ALL RESPONSES TO PHQ QUESTIONS 1-9: 3
3. TROUBLE FALLING OR STAYING ASLEEP: NOT AT ALL
6. FEELING BAD ABOUT YOURSELF - OR THAT YOU ARE A FAILURE OR HAVE LET YOURSELF OR YOUR FAMILY DOWN: NOT AT ALL
8. MOVING OR SPEAKING SO SLOWLY THAT OTHER PEOPLE COULD HAVE NOTICED. OR THE OPPOSITE, BEING SO FIGETY OR RESTLESS THAT YOU HAVE BEEN MOVING AROUND A LOT MORE THAN USUAL: NOT AT ALL
10. IF YOU CHECKED OFF ANY PROBLEMS, HOW DIFFICULT HAVE THESE PROBLEMS MADE IT FOR YOU TO DO YOUR WORK, TAKE CARE OF THINGS AT HOME, OR GET ALONG WITH OTHER PEOPLE: NOT DIFFICULT AT ALL
2. FEELING DOWN, DEPRESSED OR HOPELESS: SEVERAL DAYS
9. THOUGHTS THAT YOU WOULD BE BETTER OFF DEAD, OR OF HURTING YOURSELF: NOT AT ALL
SUM OF ALL RESPONSES TO PHQ QUESTIONS 1-9: 3
5. POOR APPETITE OR OVEREATING: NOT AT ALL
1. LITTLE INTEREST OR PLEASURE IN DOING THINGS: SEVERAL DAYS

## 2025-03-12 NOTE — PROGRESS NOTES
[facility]  Date of Encounter: 3/12/2025     Iris Gomez (: 1965) is a 60 y.o. female who presents today for:   Chief Complaint   Patient presents with    Follow-up     Mood  Pt states that she has been doing fine since last OV         Congestion, coughing up phlegm at times, watery eyes- 2 weeks.  No sinus pressure or headaches.  No systemic symptoms.   Still taking claritin. Benadryl prn has been helpful. Has not tried anything else.   New environment- more pets, dust, trees. Home will be finished end of April.      Has seen improvement with transition to zoloft 125 mg daily. No SEs.   FDC going well.    Still taking gabapentin for RLS, 300-400 mg qhs.  Only taking the additional 100 mg tablets as needed about twice a week.    Still needs to schedule with dermatology.        3/12/2025     8:43 AM 2024     8:04 AM 2024     8:35 AM   PHQ-9    Little interest or pleasure in doing things 1 1 0   Feeling down, depressed, or hopeless 1 1 0   Trouble falling or staying asleep, or sleeping too much 0 0 0   Feeling tired or having little energy 1 0 0   Poor appetite or overeating 0 1 0   Feeling bad about yourself - or that you are a failure or have let yourself or your family down 0 0 0   Trouble concentrating on things, such as reading the newspaper or watching television 0 1 0   Moving or speaking so slowly that other people could have noticed. Or the opposite - being so fidgety or restless that you have been moving around a lot more than usual 0 1 0   Thoughts that you would be better off dead, or of hurting yourself in some way 0 0 0   PHQ-2 Score 2 2 0   PHQ-9 Total Score 3 5 0   If you checked off any problems, how difficult have these problems made it for you to do your work, take care of things at home, or get along with other people? 0 1 0         2024     8:04 AM   DANYA-7 SCREENING   Feeling nervous, anxious, or on edge Several days   Not being able to stop or

## 2025-03-12 NOTE — PATIENT INSTRUCTIONS
[facility]  [address]  [place] [address]  [phone]

## 2025-05-20 ENCOUNTER — HOSPITAL ENCOUNTER (OUTPATIENT)
Dept: MAMMOGRAPHY | Age: 60
Discharge: HOME | End: 2025-05-20
Payer: COMMERCIAL

## 2025-05-20 VITALS — HEIGHT: 67 IN | WEIGHT: 198 LBS | BODY MASS INDEX: 31.08 KG/M2

## 2025-05-20 DIAGNOSIS — Z12.31 VISIT FOR SCREENING MAMMOGRAM: ICD-10-CM

## 2025-05-20 PROCEDURE — 77063 BREAST TOMOSYNTHESIS BI: CPT

## 2025-05-21 ENCOUNTER — RESULTS FOLLOW-UP (OUTPATIENT)
Dept: FAMILY MEDICINE CLINIC | Age: 60
End: 2025-05-21

## 2025-07-18 ENCOUNTER — PATIENT MESSAGE (OUTPATIENT)
Dept: FAMILY MEDICINE CLINIC | Age: 60
End: 2025-07-18

## 2025-07-21 ENCOUNTER — OFFICE VISIT (OUTPATIENT)
Dept: FAMILY MEDICINE CLINIC | Age: 60
End: 2025-07-21
Payer: COMMERCIAL

## 2025-07-21 VITALS
DIASTOLIC BLOOD PRESSURE: 70 MMHG | WEIGHT: 199.8 LBS | HEART RATE: 80 BPM | OXYGEN SATURATION: 95 % | SYSTOLIC BLOOD PRESSURE: 118 MMHG | BODY MASS INDEX: 31.36 KG/M2 | HEIGHT: 67 IN | TEMPERATURE: 98 F

## 2025-07-21 DIAGNOSIS — E78.00 HYPERCHOLESTEREMIA: ICD-10-CM

## 2025-07-21 DIAGNOSIS — G25.81 RLS (RESTLESS LEGS SYNDROME): ICD-10-CM

## 2025-07-21 DIAGNOSIS — Z00.00 WELLNESS EXAMINATION: ICD-10-CM

## 2025-07-21 DIAGNOSIS — Z00.00 WELLNESS EXAMINATION: Primary | ICD-10-CM

## 2025-07-21 DIAGNOSIS — G47.33 OSA ON CPAP: ICD-10-CM

## 2025-07-21 DIAGNOSIS — F33.1 MAJOR DEPRESSIVE DISORDER, RECURRENT, MODERATE (HCC): ICD-10-CM

## 2025-07-21 DIAGNOSIS — E78.5 DYSLIPIDEMIA: ICD-10-CM

## 2025-07-21 DIAGNOSIS — Z71.84 COUNSELING FOR TRAVEL: ICD-10-CM

## 2025-07-21 PROCEDURE — 99396 PREV VISIT EST AGE 40-64: CPT | Performed by: FAMILY MEDICINE

## 2025-07-21 PROCEDURE — 99214 OFFICE O/P EST MOD 30 MIN: CPT | Performed by: FAMILY MEDICINE

## 2025-07-21 RX ORDER — ROSUVASTATIN CALCIUM 10 MG/1
10 TABLET, COATED ORAL NIGHTLY
Qty: 90 TABLET | Refills: 3 | Status: SHIPPED | OUTPATIENT
Start: 2025-07-21

## 2025-07-21 RX ORDER — GABAPENTIN 600 MG/1
600 TABLET ORAL
Qty: 90 TABLET | Refills: 3 | Status: SHIPPED | OUTPATIENT
Start: 2025-07-21 | End: 2026-07-16

## 2025-07-21 SDOH — ECONOMIC STABILITY: FOOD INSECURITY: WITHIN THE PAST 12 MONTHS, YOU WORRIED THAT YOUR FOOD WOULD RUN OUT BEFORE YOU GOT MONEY TO BUY MORE.: NEVER TRUE

## 2025-07-21 SDOH — ECONOMIC STABILITY: FOOD INSECURITY: WITHIN THE PAST 12 MONTHS, THE FOOD YOU BOUGHT JUST DIDN'T LAST AND YOU DIDN'T HAVE MONEY TO GET MORE.: NEVER TRUE

## 2025-07-21 NOTE — PROGRESS NOTES
off any problems, how difficult have these problems made it for you to do your work, take care of things at home, or get along with other people? 0 1 0         12/30/2024     8:04 AM   DANYA-7 SCREENING   Feeling nervous, anxious, or on edge Several days   Not being able to stop or control worrying Several days   Worrying too much about different things Several days   Trouble relaxing Not at all   Being so restless that it is hard to sit still Several days   Becoming easily annoyed or irritable Several days   Feeling afraid as if something awful might happen Not at all   DANYA-7 Total Score 5   How difficult have these problems made it for you to do your work, take care of things at home, or get along with other people? Somewhat difficult           ICD-10-CM    1. Wellness examination  Z00.00 CBC     Comprehensive Metabolic Panel     Lipid Panel     Hemoglobin A1C     TSH reflex to FT4, FT3     Neris Julio, , Obstetrics, [facility]      2. Counseling for travel  Z71.84 Hepatitis A Antibody, IgG     Hepatitis B Surface Antibody      3. Hypercholesteremia  E78.00 rosuvastatin (CRESTOR) 10 MG tablet      4. RLS (restless legs syndrome)  G25.81 gabapentin (NEURONTIN) 600 MG tablet      5. Major depressive disorder, recurrent, moderate  F33.1 sertraline (ZOLOFT) 50 MG tablet      6. Dyslipidemia  E78.5       7. TANYA on CPAP  G47.33           - HM reviewed. Mammogram and colonoscopy up to date- need to get cscope records from Dr. [facility]- patient reports recs for 5 year recall.   - Travel counseling: Going to [facility] mid to late September. Planning on a stew, guided. Reviewed [facility] travel recs for this area and handout given. Encouraged patient to review further and consider travel clinic appointment. I can help her with some of these:  - Chikungunya vaccine (need to go to travel clinic)  - Cholera vaccine (oral suspension, one dose- can send prescirption to pharmacy but they may need to order it

## 2025-07-21 NOTE — PATIENT INSTRUCTIONS
CDC travel website: https://wwwnc.cdc.gov/travel/destinations/list      Passport [facility]:  [phone]    [address] [place] [address]    [address] [facility] [address], [address]  [address] travel clinic:   [phone] [address] , [address], [place] [address]

## 2025-07-22 ENCOUNTER — PATIENT MESSAGE (OUTPATIENT)
Dept: FAMILY MEDICINE CLINIC | Age: 60
End: 2025-07-22

## 2025-07-22 LAB
ALBUMIN SERPL-MCNC: 4.4 G/DL (ref 3.4–5)
ALBUMIN/GLOB SERPL: 1.8 {RATIO} (ref 1.1–2.2)
ALP SERPL-CCNC: 103 U/L (ref 40–129)
ALT SERPL-CCNC: 27 U/L (ref 10–40)
ANION GAP SERPL CALCULATED.3IONS-SCNC: 10 MMOL/L (ref 3–16)
AST SERPL-CCNC: 31 U/L (ref 15–37)
BILIRUB SERPL-MCNC: <0.2 MG/DL (ref 0–1)
BUN SERPL-MCNC: 15 MG/DL (ref 7–20)
CALCIUM SERPL-MCNC: 9.6 MG/DL (ref 8.3–10.6)
CHLORIDE SERPL-SCNC: 103 MMOL/L (ref 99–110)
CHOLEST SERPL-MCNC: 256 MG/DL (ref 0–199)
CO2 SERPL-SCNC: 27 MMOL/L (ref 21–32)
CREAT SERPL-MCNC: 0.9 MG/DL (ref 0.6–1.2)
DEPRECATED RDW RBC AUTO: 13.7 % (ref 12.4–15.4)
EST. AVERAGE GLUCOSE BLD GHB EST-MCNC: 114 MG/DL
GFR SERPLBLD CREATININE-BSD FMLA CKD-EPI: 73 ML/MIN/{1.73_M2}
GLUCOSE SERPL-MCNC: 90 MG/DL (ref 70–99)
HBA1C MFR BLD: 5.6 %
HBV SURFACE AB SERPL IA-ACNC: <3.5 MIU/ML
HCT VFR BLD AUTO: 42.8 % (ref 36–48)
HDLC SERPL-MCNC: 49 MG/DL (ref 40–60)
HGB BLD-MCNC: 14.6 G/DL (ref 12–16)
LDLC SERPL CALC-MCNC: 153 MG/DL
MCH RBC QN AUTO: 30.7 PG (ref 26–34)
MCHC RBC AUTO-ENTMCNC: 34.1 G/DL (ref 31–36)
MCV RBC AUTO: 90.1 FL (ref 80–100)
PLATELET # BLD AUTO: 263 K/UL (ref 135–450)
PMV BLD AUTO: 8.3 FL (ref 5–10.5)
POTASSIUM SERPL-SCNC: 4.4 MMOL/L (ref 3.5–5.1)
PROT SERPL-MCNC: 6.9 G/DL (ref 6.4–8.2)
RBC # BLD AUTO: 4.75 M/UL (ref 4–5.2)
SODIUM SERPL-SCNC: 140 MMOL/L (ref 136–145)
TRIGL SERPL-MCNC: 272 MG/DL (ref 0–150)
TSH SERPL DL<=0.005 MIU/L-ACNC: 4.04 UIU/ML (ref 0.27–4.2)
VLDLC SERPL CALC-MCNC: 54 MG/DL
WBC # BLD AUTO: 5.7 K/UL (ref 4–11)

## 2025-07-23 LAB — HAV AB SER QL IA: NEGATIVE

## 2025-08-08 ENCOUNTER — HOSPITAL ENCOUNTER (OUTPATIENT)
Dept: CT IMAGING | Age: 60
Discharge: HOME | End: 2025-08-08
Attending: FAMILY MEDICINE
Payer: COMMERCIAL

## 2025-08-08 DIAGNOSIS — Z82.49 FAMILY HISTORY OF MYOCARDIAL INFARCTION: ICD-10-CM

## 2025-08-08 DIAGNOSIS — E78.5 DYSLIPIDEMIA: ICD-10-CM

## 2025-08-08 PROCEDURE — 75571 CT HRT W/O DYE W/CA TEST: CPT

## 2025-08-11 ENCOUNTER — PATIENT MESSAGE (OUTPATIENT)
Dept: FAMILY MEDICINE CLINIC | Age: 60
End: 2025-08-11

## 2025-08-11 DIAGNOSIS — R91.8 LUNG NODULES: Primary | ICD-10-CM

## (undated) DEVICE — SURGICAL SET UP - SURE SET: Brand: MEDLINE INDUSTRIES, INC.

## (undated) DEVICE — SPONGE GZ W4XL4IN 12 PLY KERLIX

## (undated) DEVICE — SYRINGE IRRIG 60ML SFT PLIABLE BLB EZ TO GRP 1 HND USE W/

## (undated) DEVICE — TOWEL,OR,DSP,ST,BLUE,DLX,8/PK,10PK/CS: Brand: MEDLINE

## (undated) DEVICE — SUTURE MCRYL + SZ 4-0 L27IN ABSRB UD L19MM PS-2 3/8 CIR MCP426H

## (undated) DEVICE — ST FLUFF LG 1 PLY: Brand: DEROYAL

## (undated) DEVICE — GLOVE ORTHO 7 1/2   MSG9475

## (undated) DEVICE — PADDING CAST N ADH 12X6 IN CRIMPED FINISH 100% COTTON WBRLII

## (undated) DEVICE — PIN FIX STERILE L150MM DIA3.2MM FLUT

## (undated) DEVICE — ELECTROSURGICAL PENCIL ROCKER SWITCH NON COATED BLADE ELECTRODE 10 FT (3 M) CORD HOLSTER: Brand: MEGADYNE

## (undated) DEVICE — YANKAUER,OPEN TIP,W/O VENT,STERILE: Brand: MEDLINE INDUSTRIES, INC.

## (undated) DEVICE — DRESSING GERM DIA1IN CNTR H DIA7MM BLU CHG ANTIMIC PROTCT

## (undated) DEVICE — PIN FIX STERILE L80MM DIA3.2MM FLUT CAS

## (undated) DEVICE — DRAIN SURG 15FR L3 16IN DIA47MM SIL RND HUBLESS FULL FLUT

## (undated) DEVICE — APPLIER LIG CLP M L11IN TI STR RNG HNDL FOR 20 CLP DISP

## (undated) DEVICE — DRESSING FOAM SELF ADH 10X10 CM ABSORBENT MEPILEX BORDER FLX

## (undated) DEVICE — SOLUTION IRRIG 3000ML LAC R FLX CONT

## (undated) DEVICE — SOLUTION INJ LR VISIV 1000ML BG

## (undated) DEVICE — TOWEL,STOP FLAG GOLD N-W: Brand: MEDLINE

## (undated) DEVICE — 3M™ STERI-STRIP™ BLEND TONE SKIN CLOSURES, B1557, TAN, 1/2 IN X 4 IN (12MM X 100MM), 6 STRIPS/ENVELOPE: Brand: 3M™ STERI-STRIP™

## (undated) DEVICE — SUTURE MCRYL SZ 3-0 L27IN ABSRB UD L19MM PS-2 3/8 CIR PRIM Y427H

## (undated) DEVICE — SHEET,DRAPE,40X58,STERILE: Brand: MEDLINE

## (undated) DEVICE — GLOVE ORANGE PI 7   MSG9070

## (undated) DEVICE — SUTURE ETHBND EXCEL SZ 2 L30IN NONABSORBABLE GRN L40MM V-37 MX69G

## (undated) DEVICE — JEWISH HOSPITAL TURNOVER KIT: Brand: MEDLINE INDUSTRIES, INC.

## (undated) DEVICE — AGENT HEMSTAT 3GM OXIDIZED REGENERATED CELOS ABSRB FOR CONT (ORDER MULTIPLES OF 5EA)

## (undated) DEVICE — INSTRUMENT KIT ORTHOPEDIC KNEE NAVITRACK

## (undated) DEVICE — 3M™ STERI-DRAPE™ INSTRUMENT POUCH 1018: Brand: STERI-DRAPE™

## (undated) DEVICE — SURE SET-DOUBLE BASIN-LF: Brand: MEDLINE INDUSTRIES, INC.

## (undated) DEVICE — FIRSTPASS MINI STRAIGHT SUTURE PASSER: Brand: FIRSTPASS

## (undated) DEVICE — CLEAR-TRAC 5.5 MM X 72 MM THREADED                                    CANNULA, WITH DISPOSABLE OBTURATOR,                                    BLUE, STERILE: Brand: CLEAR-TRAC

## (undated) DEVICE — ELECTRODE PT RET AD L9FT HI MOIST COND ADH HYDRGEL CORDED

## (undated) DEVICE — E-Z CLEAN, NON-STICK, PTFE COATED, ELECTROSURGICAL BLADE ELECTRODE, MODIFIED EXTENDED INSULATION, 2.5 INCH (6.35 CM): Brand: MEGADYNE

## (undated) DEVICE — TRAY CATHETER 16FR F INCLUDE BARDX IC COMPLT CARE DRNGE BG

## (undated) DEVICE — 3M™ COBAN™ NL STERILE NON-LATEX SELF-ADHERENT WRAP, 2086S, 6 IN X 5 YD (15 CM X 4,5 M), 12 ROLLS/CASE: Brand: 3M™ COBAN™

## (undated) DEVICE — Device

## (undated) DEVICE — SUTURE ETHLN SZ 3-0 L30IN NONABSORBABLE BLK L36MM FSLX 3/8 1673BH

## (undated) DEVICE — SUTURE STRATAFIX SPRL SZ 3-0 L12IN ABSRB UD FS-1 L30X30CM SXMP2B410

## (undated) DEVICE — BRA SURG X LG 42-44 IN VELCRO FRONT CLOSURE LACE TRIM

## (undated) DEVICE — SOLUTION IV 1000ML 0.9% SOD CHL

## (undated) DEVICE — SUTURE MCRYL SZ 3-0 L27IN ABSRB UD L26MM SH 1/2 CIR Y416H

## (undated) DEVICE — APPLICATOR MEDICATED 26 CC SOLUTION HI LT ORNG CHLORAPREP

## (undated) DEVICE — STAPLER SKIN H3.9MM WIRE DIA0.58MM CRWN 6.9MM 35 STPL ROT

## (undated) DEVICE — PENCIL SMK EVAC TELSCP 3 M TBNG

## (undated) DEVICE — COVER US PRB W15XL120CM W/ GEL RUBBERBAND TAPE STRP FLD GEN

## (undated) DEVICE — BLANKET WRM W40.2XL55.9IN IORT LO BODY + MISTRAL AIR

## (undated) DEVICE — GLOVE SURG SZ 9 L1185IN FNGR THK75MIL STRW LTX POLYMER BEAD

## (undated) DEVICE — DRAPE IRRIG FLD WRM W44XL44IN W/ AORN STD PRTBL INTRATEMP

## (undated) DEVICE — NEEDLE SPNL 20GA L3.5IN YEL HUB S STL REG WALL FIT STYL W/

## (undated) DEVICE — GLOVE ORANGE PI 7 1/2   MSG9075

## (undated) DEVICE — GLOVE SURG SZ 65 L12IN FNGR THK79MIL GRN LTX FREE

## (undated) DEVICE — PLATE ES AD W 9FT CRD 2

## (undated) DEVICE — NEEDLE HYPO 22GA L1.5IN BLK POLYPR HUB S STL REG BVL STR

## (undated) DEVICE — MARKER,SKIN,WI/RULER AND LABELS: Brand: MEDLINE

## (undated) DEVICE — GOWN,SIRUS,POLYRNF,BRTHSLV,LG,30/CS: Brand: MEDLINE

## (undated) DEVICE — PAD FOAM 11.75X7-7/8 IN RESTON LF

## (undated) DEVICE — DRAPE,T,LAPARO,TRANS,STERILE: Brand: MEDLINE

## (undated) DEVICE — INTENDED USE FOR SURGICAL MARKING ON INTACT SKIN, ALSO PROVIDES A PERMANENT METHOD OF IDENTIFYING OBJECTS IN THE OPERATING ROOM: Brand: WRITESITE® PLUS MINI PREP RESISTANT MARKER

## (undated) DEVICE — SUTURE PERMA-HAND SZ 2-0 L30IN NONABSORBABLE BLK L26MM SH K833H

## (undated) DEVICE — SUTURE ETHLN SZ 3-0 L18IN NONABSORBABLE BLK FS-1 L24MM 3/8 663H

## (undated) DEVICE — SCREW BNE L25MM DIA2.5MM KNEE FULL THRD HEX FEM PERSONA (NOT IMPLANTED)

## (undated) DEVICE — ADHESIVE SKIN CLSR 0.7ML TOP DERMBND ADV

## (undated) DEVICE — SUTURE MCRYL SZ 4-0 L27IN ABSRB UD L19MM PS-2 1/2 CIR PRIM Y426H

## (undated) DEVICE — GOWN SIRUS NONREIN LG W/TWL: Brand: MEDLINE INDUSTRIES, INC.

## (undated) DEVICE — SOLUTION IRRIG 1000ML STRL H2O USP PLAS POUR BTL

## (undated) DEVICE — GOWN,SIRUS,POLYRNF,BRTHSLV,XL,30/CS: Brand: MEDLINE

## (undated) DEVICE — DUAL CUT SAGITTAL BLADE

## (undated) DEVICE — HEADLESS TROCHAR PIN 75MM: Brand: ZUK

## (undated) DEVICE — GLOVE SURG SZ 7 L12IN FNGR THK79MIL GRN LTX FREE

## (undated) DEVICE — SOLUTION IV 100ML 0.9% SOD CHL PLAS CONT USP VIAFLX 1 PER

## (undated) DEVICE — GOWN,SIRUS,POLYRNF,BRTHSLV,XLN/XXL,18/CS: Brand: MEDLINE

## (undated) DEVICE — SOLUTION IRRIG 3000ML 0.9% SOD CHL USP UROMATIC PLAS CONT

## (undated) DEVICE — OPTIFOAM GENTLE SA, POSTOP, 4X12: Brand: MEDLINE

## (undated) DEVICE — NEEDLE SUT KNEE LO PROF SCORPION

## (undated) DEVICE — GLOVE SURG SZ 65 THK91MIL LTX FREE SYN POLYISOPRENE

## (undated) DEVICE — COVER,MAYO STAND,XL,STERILE: Brand: MEDLINE

## (undated) DEVICE — INTENDED FOR TISSUE SEPARATION, AND OTHER PROCEDURES THAT REQUIRE A SHARP SURGICAL BLADE TO PUNCTURE OR CUT.: Brand: BARD-PARKER ® CARBON RIB-BACK BLADES

## (undated) DEVICE — FLUID TRAP FOR MINIVAC ES EQUIP FLD TRAP

## (undated) DEVICE — ZIMMER® STERILE DISPOSABLE TOURNIQUET CUFF WITH PLC, DUAL PORT, SINGLE BLADDER, 30 IN. (76 CM)

## (undated) DEVICE — BLADE SHV L13CM DIA4MM TAPR TIP SCIS LIKE CUT OVL OUTER

## (undated) DEVICE — PAD DRY FLOOR ABS 32X58IN GRN

## (undated) DEVICE — TUBING PMP L16FT MAIN DISP FOR AR-6400 AR-6475

## (undated) DEVICE — SUTURE STRATAFIX SYMMETRIC PDS + SZ 2-0 L18IN ABSRB VLT SXPP1A403

## (undated) DEVICE — GARMENT,MEDLINE,DVT,INT,CALF,MED, GEN2: Brand: MEDLINE

## (undated) DEVICE — KIT ARTHSCP INSTR W/ MTL SPEAR AND DRL DISP FOR 3.5MM

## (undated) DEVICE — SUTURE FIBERLOOP SZ 2-0 L13IN NONABSORBABLE BLU L64.8MM AR723203

## (undated) DEVICE — TUBING PMP L6FT CONT WAVE EXTN

## (undated) DEVICE — GLOVE SURG SZ 9 L12IN FNGR THK79MIL GRN LTX FREE

## (undated) DEVICE — UNDERGLOVE SURG SZ 8 BLU LTX FREE SYN POLYISOPRENE POLYMER

## (undated) DEVICE — Z DUP USE 2701075 SYSTEM SKIN CLSR 42CM DERMBND PRINEO

## (undated) DEVICE — PAD,NON-ADHERENT,3X8,STERILE,LF,1/PK: Brand: MEDLINE

## (undated) DEVICE — 3M™ TEGADERM™ TRANSPARENT FILM DRESSING FRAME STYLE, 1624W, 2-3/8 IN X 2-3/4 IN (6 CM X 7 CM), 100/CT 4CT/CASE: Brand: 3M™ TEGADERM™

## (undated) DEVICE — CANNULA ARTHSCP L2CM DIA8MM PASSPRT BTTN

## (undated) DEVICE — SUTURE VCRL SZ 3-0 L27IN ABSRB UD L26MM SH 1/2 CIR J416H

## (undated) DEVICE — BLANKET WRM W29.9XL79.1IN UP BODY FORC AIR MISTRAL-AIR

## (undated) DEVICE — GLOVE ORANGE PI 8 1/2   MSG9085

## (undated) DEVICE — BLADE SHV L13CM DIA4MM EXCALIBUR AGG COOLCUT

## (undated) DEVICE — PACK,UNIVERSAL,NO GOWNS: Brand: MEDLINE

## (undated) DEVICE — KNEE ARTHROSCOPY: Brand: MEDLINE INDUSTRIES, INC.

## (undated) DEVICE — COVER LT HNDL BLU PLAS

## (undated) DEVICE — ADHESIVE SKIN CLOSURE WND 8.661X1.5 IN 22 CM LIQUIBAND SECUR

## (undated) DEVICE — MENISCAL ROOT REPAIR PACK WITH                                    ULTRATAPE SUTURE: Brand: ACUFEX MENISCAL ROOT REPAIR

## (undated) DEVICE — TOTAL KNEE: Brand: MEDLINE INDUSTRIES, INC.

## (undated) DEVICE — 3 BONE CEMENT MIXER: Brand: MIXEVAC

## (undated) DEVICE — PLASMABLADE PS210-030S 3.0S LOCK: Brand: PLASMABLADE™

## (undated) DEVICE — SUTURE PERMA-HAND 0 L18IN NONABSORBABLE BLK CT-1 L36MM 1/2 C021D

## (undated) DEVICE — SUTURE VCRL + SZ 0 L27IN ABSRB UD CT-1 L36MM 1/2 CIR TAPR VCP260H

## (undated) DEVICE — SUTURE STRATAFIX SPRL SZ 1 L14IN ABSRB VLT L48CM CTX 1/2 SXPD2B405

## (undated) DEVICE — SPONGE,LAP,18"X18",DLX,XR,ST,5/PK,40/PK: Brand: MEDLINE

## (undated) DEVICE — KNEE HOLDER DISPOSABLE LINER: Brand: ALVARADO®  KNEE SUPPORT

## (undated) DEVICE — BLADE SURG DISP EPICUT

## (undated) DEVICE — ROSA RBTC UNT 20 DROP

## (undated) DEVICE — TUBING FLD MGMT Y DBL SPIK DUALWAVE